# Patient Record
Sex: MALE | Race: WHITE | NOT HISPANIC OR LATINO | Employment: FULL TIME | URBAN - METROPOLITAN AREA
[De-identification: names, ages, dates, MRNs, and addresses within clinical notes are randomized per-mention and may not be internally consistent; named-entity substitution may affect disease eponyms.]

---

## 2017-11-08 RX ORDER — ATENOLOL 50 MG/1
TABLET ORAL
Qty: 30 TABLET | Refills: 11 | Status: SHIPPED | OUTPATIENT
Start: 2017-11-08 | End: 2018-11-15 | Stop reason: SDUPTHER

## 2017-12-21 ENCOUNTER — TELEPHONE (OUTPATIENT)
Dept: FAMILY MEDICINE | Facility: CLINIC | Age: 62
End: 2017-12-21

## 2017-12-21 ENCOUNTER — OFFICE VISIT (OUTPATIENT)
Dept: FAMILY MEDICINE | Facility: CLINIC | Age: 62
End: 2017-12-21
Payer: COMMERCIAL

## 2017-12-21 VITALS
HEART RATE: 60 BPM | SYSTOLIC BLOOD PRESSURE: 108 MMHG | BODY MASS INDEX: 26.32 KG/M2 | DIASTOLIC BLOOD PRESSURE: 70 MMHG | HEIGHT: 71 IN | WEIGHT: 188 LBS | TEMPERATURE: 98 F | RESPIRATION RATE: 16 BRPM

## 2017-12-21 DIAGNOSIS — I10 HYPERTENSION, UNSPECIFIED TYPE: Primary | ICD-10-CM

## 2017-12-21 DIAGNOSIS — Z00.00 WELL ADULT EXAM: ICD-10-CM

## 2017-12-21 DIAGNOSIS — N20.0 KIDNEY STONE: ICD-10-CM

## 2017-12-21 DIAGNOSIS — K64.8 INTERNAL HEMORRHOID: ICD-10-CM

## 2017-12-21 DIAGNOSIS — N40.0 PROSTATE ENLARGEMENT: ICD-10-CM

## 2017-12-21 LAB
ALBUMIN SERPL-MCNC: 4.2 G/DL (ref 3.1–4.7)
ALP SERPL-CCNC: 72 IU/L (ref 40–104)
ALT (SGPT): 33 IU/L (ref 3–33)
AST SERPL-CCNC: 28 IU/L (ref 10–40)
BILIRUB SERPL-MCNC: 0.7 MG/DL (ref 0.3–1)
BILIRUBIN DIRECT+TOT PNL SERPL-MCNC: <0.1 MG/DL (ref 0–0.2)
BUN SERPL-MCNC: 13 MG/DL (ref 8–20)
CALCIUM SERPL-MCNC: 9.1 MG/DL (ref 7.7–10.4)
CHLORIDE: 110 MMOL/L (ref 98–110)
CO2 SERPL-SCNC: 27.1 MMOL/L (ref 22.8–31.6)
COMPLEXED PSA SERPL-MCNC: 2.4 NG/ML (ref 0–3)
CREATININE: 0.99 MG/DL (ref 0.6–1.4)
GLUCOSE: 93 MG/DL (ref 70–99)
HBA1C MFR BLD: 5.5 % (ref 3.1–6.5)
POTASSIUM SERPL-SCNC: 3.7 MMOL/L (ref 3.5–5)
PROT SERPL-MCNC: 7.1 G/DL (ref 6–8.2)
SODIUM: 142 MMOL/L (ref 134–144)

## 2017-12-21 PROCEDURE — 99396 PREV VISIT EST AGE 40-64: CPT | Mod: ,,, | Performed by: FAMILY MEDICINE

## 2017-12-21 RX ORDER — ASPIRIN 81 MG/1
81 TABLET ORAL DAILY
Refills: 0 | COMMUNITY
Start: 2017-12-21 | End: 2018-12-21

## 2017-12-21 NOTE — TELEPHONE ENCOUNTER
----- Message from Dick Mendez MD sent at 12/21/2017  1:33 PM CST -----  Results Ok, notify patient.

## 2017-12-21 NOTE — PROGRESS NOTES
Subjective:       Patient ID: Carlos Saravia is a 62 y.o. male.    Chief Complaint: Annual Exam      History of hypertension which has been stable. He has a history kidney stone several years ago none since and does admit to drinking lots of water at this time and watching his diet has not had an ultrasound and several years. Had a history of high cholesterol on last visit and a A1c that was borderline 6.0.        Allergies and Medications:   Review of patient's allergies indicates:   Allergen Reactions    No known drug allergies      Current Outpatient Prescriptions   Medication Sig Dispense Refill    ascorbic acid (VITAMIN C) 500 MG tablet Take 500 mg by mouth once daily.        atenolol (TENORMIN) 50 MG tablet TAKE ONE TABLET BY MOUTH ONCE DAILY 30 tablet 11    fish oil-omega-3 fatty acids 300-1,000 mg capsule Take 1 g by mouth once daily.        glucosamine &chondroit-mv-min3 944-565-93-0.5 mg Tab Take 1 tablet by mouth once daily.        multivitamin capsule Take 1 capsule by mouth once daily.        psyllium (METAMUCIL) packet Take 1 packet by mouth once daily.      saw palmetto 80 MG capsule Take 1 tablet by mouth Daily. No Sig Provided      aspirin (ECOTRIN) 81 MG EC tablet Take 1 tablet (81 mg total) by mouth once daily.  0     No current facility-administered medications for this visit.        Family History:   Family History   Problem Relation Age of Onset    Hypertension Mother     Diabetes Mother     No Known Problems Father        Social History:   Social History     Social History    Marital status:      Spouse name: N/A    Number of children: N/A    Years of education: N/A     Occupational History    Not on file.     Social History Main Topics    Smoking status: Never Smoker    Smokeless tobacco: Never Used    Alcohol use No    Drug use: No    Sexual activity: Not on file     Other Topics Concern    Not on file     Social History Narrative    No narrative on file        Review of Systems   Constitutional: Negative for activity change, appetite change, chills, diaphoresis, fatigue, fever and unexpected weight change.   HENT: Negative for congestion, dental problem, drooling, ear discharge, ear pain, facial swelling, hearing loss, mouth sores, nosebleeds, postnasal drip, rhinorrhea, sinus pain, sinus pressure, sneezing, sore throat, tinnitus, trouble swallowing and voice change.    Eyes: Negative for photophobia, pain, discharge, redness, itching and visual disturbance.   Respiratory: Negative for apnea, cough, choking, chest tightness, shortness of breath, wheezing and stridor.    Cardiovascular: Negative for chest pain, palpitations and leg swelling.   Gastrointestinal: Negative for abdominal distention, abdominal pain, anal bleeding, blood in stool, constipation, diarrhea, nausea, rectal pain and vomiting.   Endocrine: Negative for cold intolerance, heat intolerance, polydipsia, polyphagia and polyuria.   Genitourinary: Negative for decreased urine volume, difficulty urinating, discharge, dysuria, enuresis, flank pain, frequency, genital sores, hematuria, penile pain, penile swelling, scrotal swelling, testicular pain and urgency.   Musculoskeletal: Positive for back pain (occ related to sitting). Negative for arthralgias, gait problem, joint swelling, myalgias, neck pain and neck stiffness.   Skin: Negative for color change, pallor, rash and wound.   Allergic/Immunologic: Negative for environmental allergies, food allergies and immunocompromised state.   Neurological: Negative for dizziness, tremors, seizures, syncope, facial asymmetry, speech difficulty, weakness, light-headedness, numbness and headaches.   Hematological: Negative for adenopathy. Does not bruise/bleed easily.   Psychiatric/Behavioral: Negative for agitation, behavioral problems, confusion, decreased concentration, dysphoric mood, hallucinations, self-injury, sleep disturbance and suicidal ideas. The  patient is not nervous/anxious and is not hyperactive.        Objective:     Vitals:    12/21/17 0838   BP: 108/70   Pulse: 60   Resp: 16   Temp: 97.5 °F (36.4 °C)        Physical Exam   Constitutional: He is oriented to person, place, and time. He appears well-developed and well-nourished.  Non-toxic appearance. He does not have a sickly appearance. He does not appear ill. No distress.   HENT:   Head: Normocephalic and atraumatic.   Right Ear: External ear normal.   Left Ear: External ear normal.   Nose: Nose normal.   Mouth/Throat: Oropharynx is clear and moist. No oropharyngeal exudate.   Eyes: Conjunctivae and EOM are normal. Pupils are equal, round, and reactive to light. Right eye exhibits no discharge. Left eye exhibits no discharge. No scleral icterus.   Neck: Normal range of motion. Neck supple. No JVD present. No tracheal deviation present. No thyromegaly present.   Cardiovascular: Normal rate, normal heart sounds and intact distal pulses.  Exam reveals no gallop and no friction rub.    No murmur heard.  Pulmonary/Chest: Effort normal and breath sounds normal. No stridor. No respiratory distress. He has no wheezes. He has no rales. He exhibits no tenderness.   Abdominal: Soft. Bowel sounds are normal. He exhibits no distension and no mass. There is no tenderness. There is no rebound and no guarding. No hernia.   Genitourinary: Rectum normal, prostate normal, testes normal and penis normal. Rectal exam shows guaiac negative stool. Cremasteric reflex is present. Right testis shows no mass, no swelling and no tenderness. Right testis is descended. Cremasteric reflex is not absent on the right side. Left testis shows no mass, no swelling and no tenderness. Left testis is descended. Cremasteric reflex is not absent on the left side. Circumcised. No phimosis, paraphimosis, hypospadias, penile erythema or penile tenderness. No discharge found.   Genitourinary Comments: Prostate mildly enlarged internal  hemorrhoids both stable   Musculoskeletal: He exhibits no edema, tenderness or deformity.   Lymphadenopathy:     He has no cervical adenopathy.   Neurological: He is alert and oriented to person, place, and time. He has normal reflexes. He displays normal reflexes. No cranial nerve deficit. He exhibits normal muscle tone. Coordination normal.   Skin: Skin is warm and dry. No rash noted. He is not diaphoretic. No erythema. No pallor.   Psychiatric: He has a normal mood and affect. His behavior is normal. Judgment and thought content normal.   Nursing note and vitals reviewed.    I have recommended that this patient have a flu shot but he declines at this time. I have discussed the risks and benefits of this examination with him. The patient verbalizes understanding.    Assessment:       1. Well adolescent visit    2. Hypertension, unspecified type    3. Prostate enlargement    4. Internal hemorrhoid    5. Kidney stone    6. Well adult exam        Plan:       Carlos was seen today for annual exam.    Diagnoses and all orders for this visit:    Well adolescent visit    Hypertension, unspecified type  -     aspirin (ECOTRIN) 81 MG EC tablet; Take 1 tablet (81 mg total) by mouth once daily.    Prostate enlargement  -     PSA, Screening; Future  -     PSA, Screening    Internal hemorrhoid    Kidney stone  -     US Retroperitoneal Complete (Kidney and; Future    Well adult exam  -     aspirin (ECOTRIN) 81 MG EC tablet; Take 1 tablet (81 mg total) by mouth once daily.  -     Hemoglobin A1c; Future  -     Lipid panel; Future  -     Basic metabolic panel; Future  -     Hepatic function panel; Future  -     Hepatitis C antibody; Future  -     Hemoglobin A1c  -     Lipid panel  -     Basic metabolic panel  -     Hepatic function panel  -     Hepatitis C antibody         Return in about 1 year (around 12/21/2018), or if symptoms worsen or fail to improve.

## 2017-12-22 LAB — HCV AB SERPL QL IA: <0.1 S/CO RATIO (ref 0–0.9)

## 2018-03-05 ENCOUNTER — TELEPHONE (OUTPATIENT)
Dept: FAMILY MEDICINE | Facility: CLINIC | Age: 63
End: 2018-03-05

## 2018-11-15 RX ORDER — ATENOLOL 50 MG/1
50 TABLET ORAL DAILY
Qty: 30 TABLET | Refills: 1 | Status: SHIPPED | OUTPATIENT
Start: 2018-11-15 | End: 2018-12-21 | Stop reason: SDUPTHER

## 2018-11-15 NOTE — TELEPHONE ENCOUNTER
Patient came in the office and requested a refill on Atenolo 50 mg tablet.     Patient has an appointment on December 21. Patient was last seen in the office on 12/21/2017.

## 2018-12-21 ENCOUNTER — OFFICE VISIT (OUTPATIENT)
Dept: FAMILY MEDICINE | Facility: CLINIC | Age: 63
End: 2018-12-21
Payer: MEDICARE

## 2018-12-21 VITALS
DIASTOLIC BLOOD PRESSURE: 80 MMHG | BODY MASS INDEX: 25.34 KG/M2 | SYSTOLIC BLOOD PRESSURE: 118 MMHG | OXYGEN SATURATION: 98 % | HEART RATE: 52 BPM | TEMPERATURE: 98 F | WEIGHT: 181 LBS | HEIGHT: 71 IN

## 2018-12-21 DIAGNOSIS — N40.0 PROSTATE ENLARGEMENT: ICD-10-CM

## 2018-12-21 DIAGNOSIS — M10.9 GOUT, UNSPECIFIED CAUSE, UNSPECIFIED CHRONICITY, UNSPECIFIED SITE: ICD-10-CM

## 2018-12-21 DIAGNOSIS — Z13.220 ENCOUNTER FOR LIPID SCREENING FOR CARDIOVASCULAR DISEASE: ICD-10-CM

## 2018-12-21 DIAGNOSIS — K63.5 POLYP OF COLON, UNSPECIFIED PART OF COLON, UNSPECIFIED TYPE: ICD-10-CM

## 2018-12-21 DIAGNOSIS — Z13.6 ENCOUNTER FOR LIPID SCREENING FOR CARDIOVASCULAR DISEASE: ICD-10-CM

## 2018-12-21 DIAGNOSIS — Z00.00 PREVENTATIVE HEALTH CARE: ICD-10-CM

## 2018-12-21 DIAGNOSIS — Z00.00 WELL ADULT EXAM: ICD-10-CM

## 2018-12-21 DIAGNOSIS — Z28.20 VACCINE REFUSED BY PATIENT: ICD-10-CM

## 2018-12-21 DIAGNOSIS — I10 ESSENTIAL HYPERTENSION: Primary | ICD-10-CM

## 2018-12-21 DIAGNOSIS — Z12.5 ENCOUNTER FOR PROSTATE CANCER SCREENING: ICD-10-CM

## 2018-12-21 LAB
ALBUMIN SERPL-MCNC: 4.3 G/DL (ref 3.1–4.7)
ALP SERPL-CCNC: 71 IU/L (ref 40–104)
ALT (SGPT): 26 IU/L (ref 3–33)
AST SERPL-CCNC: 30 IU/L (ref 10–40)
BILIRUB SERPL-MCNC: 0.9 MG/DL (ref 0.3–1)
BUN SERPL-MCNC: 17 MG/DL (ref 8–20)
CALCIUM SERPL-MCNC: 9.5 MG/DL (ref 7.7–10.4)
CHLORIDE: 108 MMOL/L (ref 98–110)
CO2 SERPL-SCNC: 26 MMOL/L (ref 22.8–31.6)
COMPLEXED PSA SERPL-MCNC: 1.8 NG/ML (ref 0–3)
CREATININE: 0.92 MG/DL (ref 0.6–1.4)
GLUCOSE: 92 MG/DL (ref 70–99)
POTASSIUM SERPL-SCNC: 3.9 MMOL/L (ref 3.5–5)
PROT SERPL-MCNC: 7.6 G/DL (ref 6–8.2)
SODIUM: 143 MMOL/L (ref 134–144)

## 2018-12-21 PROCEDURE — 99214 OFFICE O/P EST MOD 30 MIN: CPT | Mod: ,,, | Performed by: FAMILY MEDICINE

## 2018-12-21 RX ORDER — ATENOLOL 50 MG/1
50 TABLET ORAL DAILY
Qty: 30 TABLET | Refills: 5 | Status: SHIPPED | OUTPATIENT
Start: 2018-12-21 | End: 2019-05-14 | Stop reason: SDUPTHER

## 2018-12-21 NOTE — PROGRESS NOTES
Subjective:       Patient ID: Carlos Saravia is a 63 y.o. male.    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        Allergies and Medications:   Review of patient's allergies indicates:   Allergen Reactions    No known drug allergies      Current Outpatient Medications   Medication Sig Dispense Refill    ascorbic acid (VITAMIN C) 500 MG tablet Take 500 mg by mouth once daily.        atenolol (TENORMIN) 50 MG tablet Take 1 tablet (50 mg total) by mouth once daily. 30 tablet 5    fish oil-omega-3 fatty acids 300-1,000 mg capsule Take 1 g by mouth once daily.        glucosamine &chondroit-mv-min3 082-599-88-0.5 mg Tab Take 1 tablet by mouth once daily.        multivitamin capsule Take 1 capsule by mouth once daily.        psyllium (METAMUCIL) packet Take 1 packet by mouth once daily.      saw palmetto 80 MG capsule Take 1 tablet by mouth Daily. No Sig Provided       No current facility-administered medications for this visit.        Family History:   Family History   Problem Relation Age of Onset    Hypertension Mother     Diabetes Mother     No Known Problems Father        Social History:   Social History     Socioeconomic History    Marital status:      Spouse name: Not on file    Number of children: Not on file    Years of education: Not on file    Highest education level: Not on file   Social Needs    Financial resource strain: Not on file    Food insecurity - worry: Not on file    Food insecurity - inability: Not on file    Transportation needs - medical: Not on file    Transportation needs - non-medical: Not on file   Occupational History    Not on file   Tobacco Use    Smoking status: Never Smoker    Smokeless tobacco: Never Used    Substance and Sexual Activity    Alcohol use: No    Drug use: No    Sexual activity: Not on file   Other Topics Concern    Not on file   Social History Narrative    Not on file       Review of Systems   Constitutional: Negative for activity change, appetite change, chills, diaphoresis, fatigue, fever and unexpected weight change.   HENT: Negative for congestion, dental problem, drooling, ear discharge, ear pain, facial swelling, hearing loss, mouth sores, nosebleeds, postnasal drip, rhinorrhea, sinus pressure, sinus pain, sneezing, sore throat, tinnitus, trouble swallowing and voice change.    Eyes: Negative for photophobia, pain, discharge, redness, itching and visual disturbance.   Respiratory: Negative for apnea, cough, choking, chest tightness, shortness of breath, wheezing and stridor.    Cardiovascular: Negative for chest pain, palpitations and leg swelling.   Gastrointestinal: Negative for abdominal distention, abdominal pain, anal bleeding, blood in stool, constipation, diarrhea, nausea, rectal pain and vomiting.   Endocrine: Negative for cold intolerance, heat intolerance, polydipsia, polyphagia and polyuria.   Genitourinary: Negative for decreased urine volume, difficulty urinating, discharge, dysuria, enuresis, flank pain, frequency, genital sores, hematuria (with high impact activities), penile pain, penile swelling, scrotal swelling, testicular pain and urgency.        Prostatitis     Musculoskeletal: Positive for neck pain. Negative for arthralgias, back pain, gait problem, joint swelling, myalgias and neck stiffness.        Gout: Toes of both feet uses apple cider vinegar lemon juice and water and eradicates the   Skin: Negative for color change, pallor, rash and wound.   Allergic/Immunologic: Negative for environmental allergies, food allergies and immunocompromised state.   Neurological: Negative for dizziness, tremors, seizures, syncope, facial asymmetry, speech difficulty, weakness,  light-headedness, numbness and headaches.   Hematological: Negative for adenopathy. Does not bruise/bleed easily.   Psychiatric/Behavioral: Negative for agitation, behavioral problems, confusion, decreased concentration, dysphoric mood, hallucinations, self-injury, sleep disturbance and suicidal ideas. The patient is not nervous/anxious and is not hyperactive.        Objective:     Vitals:    12/21/18 0828   BP: 118/80   Pulse: (!) 52   Temp: 97.6 °F (36.4 °C)        Physical Exam   Constitutional: He is oriented to person, place, and time. He appears well-developed and well-nourished.  Non-toxic appearance. He does not have a sickly appearance. He does not appear ill. No distress.   HENT:   Head: Normocephalic and atraumatic.   Right Ear: External ear normal.   Left Ear: External ear normal.   Nose: Nose normal.   Mouth/Throat: Oropharynx is clear and moist. No oropharyngeal exudate.   Eyes: Conjunctivae and EOM are normal. Pupils are equal, round, and reactive to light. Right eye exhibits no discharge. Left eye exhibits no discharge. No scleral icterus.   Neck: Normal range of motion. Neck supple. No JVD present. No tracheal deviation present. No thyromegaly present.   Cardiovascular: Normal rate, regular rhythm, normal heart sounds and intact distal pulses. Exam reveals no gallop and no friction rub.   No murmur heard.  Pulmonary/Chest: Effort normal and breath sounds normal. No stridor. No respiratory distress. He has no wheezes. He has no rales. He exhibits no tenderness.   Abdominal: Soft. Bowel sounds are normal. He exhibits no distension and no mass. There is no tenderness. There is no rebound and no guarding. No hernia.   Genitourinary: Rectum normal, prostate normal, testes normal and penis normal. Rectal exam shows guaiac negative stool. Cremasteric reflex is present. Right testis shows no mass, no swelling and no tenderness. Right testis is descended. Cremasteric reflex is not absent on the right side.  Left testis shows no mass, no swelling and no tenderness. Left testis is descended. Cremasteric reflex is not absent on the left side. Circumcised. No phimosis, paraphimosis, hypospadias, penile erythema or penile tenderness. No discharge found.   Musculoskeletal: He exhibits no edema, tenderness or deformity.   Lymphadenopathy:     He has no cervical adenopathy.   Neurological: He is alert and oriented to person, place, and time. He has normal reflexes. He displays normal reflexes. No cranial nerve deficit or sensory deficit. He exhibits normal muscle tone. Coordination normal.   Skin: Skin is warm and dry. Capillary refill takes less than 2 seconds. No rash noted. He is not diaphoretic. No erythema. No pallor.   Psychiatric: He has a normal mood and affect. His behavior is normal. Judgment and thought content normal.   Nursing note and vitals reviewed.      Assessment:       1. Essential hypertension    2. Gout, unspecified cause, unspecified chronicity, unspecified site    3. Prostate enlargement    4. Polyp of colon, unspecified part of colon, unspecified type    5. Encounter for lipid screening for cardiovascular disease    6. Encounter for prostate cancer screening    7. Well adult exam    8. Preventative health care        Plan:       Carlos was seen today for annual exam.    Diagnoses and all orders for this visit:    Essential hypertension  -     atenolol (TENORMIN) 50 MG tablet; Take 1 tablet (50 mg total) by mouth once daily.    Gout, unspecified cause, unspecified chronicity, unspecified site    Prostate enlargement  -     PSA, Screening; Future  -     PSA, Screening    Polyp of colon, unspecified part of colon, unspecified type  -     Ambulatory referral to Gastroenterology    Encounter for lipid screening for cardiovascular disease  -     Lipid panel; Future  -     Lipid panel    Encounter for prostate cancer screening  -     PSA, Screening; Future  -     PSA, Screening    Well adult  exam    Preventative health care  -     Ambulatory referral to Gastroenterology  -     CBC auto differential; Future  -     Comprehensive metabolic panel; Future  -     Lipid panel; Future  -     Urinalysis; Future  -     CBC auto differential  -     Comprehensive metabolic panel  -     Lipid panel  -     Urinalysis         Follow-up in about 6 months (around 6/21/2019).

## 2019-03-25 PROBLEM — Z13.220 ENCOUNTER FOR LIPID SCREENING FOR CARDIOVASCULAR DISEASE: Status: RESOLVED | Noted: 2018-12-21 | Resolved: 2019-03-25

## 2019-03-25 PROBLEM — Z13.6 ENCOUNTER FOR LIPID SCREENING FOR CARDIOVASCULAR DISEASE: Status: RESOLVED | Noted: 2018-12-21 | Resolved: 2019-03-25

## 2019-03-25 PROBLEM — Z00.00 WELL ADULT EXAM: Status: RESOLVED | Noted: 2018-12-21 | Resolved: 2019-03-25

## 2019-05-14 DIAGNOSIS — I10 ESSENTIAL HYPERTENSION: ICD-10-CM

## 2019-05-14 RX ORDER — ATENOLOL 50 MG/1
TABLET ORAL
Qty: 90 TABLET | Refills: 1 | Status: SHIPPED | OUTPATIENT
Start: 2019-05-14 | End: 2019-11-12 | Stop reason: SDUPTHER

## 2019-06-24 ENCOUNTER — OFFICE VISIT (OUTPATIENT)
Dept: FAMILY MEDICINE | Facility: CLINIC | Age: 64
End: 2019-06-24
Payer: MEDICARE

## 2019-06-24 VITALS
HEART RATE: 52 BPM | SYSTOLIC BLOOD PRESSURE: 134 MMHG | BODY MASS INDEX: 25.69 KG/M2 | RESPIRATION RATE: 16 BRPM | TEMPERATURE: 98 F | DIASTOLIC BLOOD PRESSURE: 86 MMHG | WEIGHT: 183.5 LBS | HEIGHT: 71 IN | OXYGEN SATURATION: 97 %

## 2019-06-24 DIAGNOSIS — Z91.89 CARDIOVASCULAR EVENT RISK: ICD-10-CM

## 2019-06-24 DIAGNOSIS — N40.0 PROSTATE ENLARGEMENT: Primary | ICD-10-CM

## 2019-06-24 DIAGNOSIS — E78.2 MIXED HYPERLIPIDEMIA: ICD-10-CM

## 2019-06-24 DIAGNOSIS — I10 ESSENTIAL HYPERTENSION: ICD-10-CM

## 2019-06-24 DIAGNOSIS — Z28.21 IMMUNIZATION REFUSED: ICD-10-CM

## 2019-06-24 LAB
ALBUMIN SERPL-MCNC: 4.1 G/DL (ref 3.1–4.7)
ALP SERPL-CCNC: 72 IU/L (ref 40–104)
ALT (SGPT): 37 IU/L (ref 3–33)
AST SERPL-CCNC: 27 IU/L (ref 10–40)
BILIRUB SERPL-MCNC: 0.9 MG/DL (ref 0.3–1)
BUN SERPL-MCNC: 13 MG/DL (ref 8–20)
CALCIUM SERPL-MCNC: 9.6 MG/DL (ref 7.7–10.4)
CHLORIDE: 108 MMOL/L (ref 98–110)
CO2 SERPL-SCNC: 27.5 MMOL/L (ref 22.8–31.6)
CREATININE: 0.96 MG/DL (ref 0.6–1.4)
GLUCOSE: 100 MG/DL (ref 70–99)
POTASSIUM SERPL-SCNC: 4.1 MMOL/L (ref 3.5–5)
PROT SERPL-MCNC: 7.5 G/DL (ref 6–8.2)
SODIUM: 144 MMOL/L (ref 134–144)

## 2019-06-24 PROCEDURE — 99213 PR OFFICE/OUTPT VISIT, EST, LEVL III, 20-29 MIN: ICD-10-PCS | Mod: ,,, | Performed by: FAMILY MEDICINE

## 2019-06-24 PROCEDURE — 99213 OFFICE O/P EST LOW 20 MIN: CPT | Mod: ,,, | Performed by: FAMILY MEDICINE

## 2019-06-24 RX ORDER — NAPROXEN SODIUM 220 MG/1
81 TABLET, FILM COATED ORAL DAILY
Refills: 0
Start: 2019-06-24 | End: 2023-09-28

## 2019-06-24 RX ORDER — PRAVASTATIN SODIUM 20 MG/1
20 TABLET ORAL DAILY
Qty: 90 TABLET | Refills: 3 | Status: SHIPPED | OUTPATIENT
Start: 2019-06-24 | End: 2020-05-25

## 2019-06-24 NOTE — PATIENT INSTRUCTIONS
4 Steps for Eating Healthier  Changing the way you eat can improve your health. It can lower your cholesterol and blood pressure, and help you stay at a healthy weight. Your diet doesnt have to be bland and boring to be healthy. Just watch your calories and follow these steps:    1. Eat fewer unhealthy fats  · Choose more fish and lean meats instead of fatty cuts of meat.  · Skip butter and lard, and use less margarine.  · Pass on foods that have palm, coconut, or hydrogenated oils.  · Eat fewer high-fat dairy foods like cheese, ice cream, and whole milk.  · Get a heart-healthy cookbook and try some low-fat recipes.  2. Go light on salt  · Keep the saltshaker off the table.  · Limit high-salt ingredients, such as soy sauce, bouillon, and garlic salt.  · Instead of adding salt when cooking, season your food with herbs and flavorings. Try lemon, garlic, and onion.  · Limit convenience foods, such as boxed or canned foods and restaurant food.  · Read food labels and choose lower-sodium options.  3. Limit sugar  · Pause before you add sugars to pancakes, cereal, coffee, or tea. This includes white and brown table sugar, syrup, honey, and molasses. Cut your usual amount by half.  · Use non-sugar sweeteners. Stevia, aspartame, and sucralose can satisfy a sweet tooth without adding calories.  · Swap out sugar-filled soda and other drinks. Buy sugar-free or low-calorie beverages. Remember water is always the best choice.  · Read labels and choose foods with less added sugar. Keep in mind that dairy foods and foods with fruit will have some natural sugar.  · Cut the sugar in recipes by 1/3 to 1/2. Boost the flavor with extracts like almond, vanilla, or orange. Or add spices such as cinnamon or nutmeg.  4. Eat more fiber  · Eat fresh fruits and vegetables every day.  · Boost your diet with whole grains. Go for oats, whole-grain rice, and bran.  · Add beans and lentils to your meals.  · Drink more water to match your fiber  increase. This is to help prevent constipation.  Date Last Reviewed: 5/11/2015  © 5687-8721 [employer]. [address] [place] [address], [place] [address]. All rights reserved. This information is not intended as a substitute for professional medical care. Always follow your healthcare professional's instructions.

## 2019-06-24 NOTE — PROGRESS NOTES
Subjective:       Patient ID: Carlos Saravia is a 63 y.o. male.    Chief Complaint: Hypertension (6 month follow up ) and Hyperlipidemia (6 month follow up )      Patient is here to follow-up on cholesterol and hypertension he had blood work done 6 months ago showed an elevated cholesterol.  Cholesterol                   247 H                       mg/dL       Triglycerides                 123                         mg/dL       HDL Cholesterol                45                        23-75  mg/dL       LDL Cholesterol               177 H                      0-100  mg/dL       VLDL Cholesterol               25                        12-27  mg/dL       Cholesterol Ratio            5.00                                            CHOLESTEROL RATIO INTERPRETATION   Patient does not want to take any statins or other cholesterol lowering drugs but is taking red yeast rice, and tolerates it okay.                          MEN-- WOMEN     His blood pressure has been stable on his current regimen. He has an enlarged prostate but his symptoms are controlled on saw palmetto has to void once to twice per night.        Hypertension     Hyperlipidemia         Allergies and Medications:   Review of patient's allergies indicates:   Allergen Reactions    No known drug allergies      Current Outpatient Medications   Medication Sig Dispense Refill    ascorbic acid (VITAMIN C) 500 MG tablet Take 500 mg by mouth once daily.        atenolol (TENORMIN) 50 MG tablet TAKE 1 TABLET BY MOUTH ONCE DAILY 90 tablet 1    fish oil-omega-3 fatty acids 300-1,000 mg capsule Take 1 g by mouth once daily.        glucosamine &chondroit-mv-min3 913-874-58-0.5 mg Tab Take 1 tablet by mouth once daily.        multivitamin capsule Take 1 capsule by mouth once daily.        psyllium (METAMUCIL) packet Take 1 packet by mouth once daily.      saw palmetto 80 MG capsule Take 1 tablet by mouth Daily. No Sig Provided      aspirin 81 MG Chew Take  1 tablet (81 mg total) by mouth once daily.  0    pravastatin (PRAVACHOL) 20 MG tablet Take 1 tablet (20 mg total) by mouth once daily. 90 tablet 3     No current facility-administered medications for this visit.        Family History:   Family History   Problem Relation Age of Onset    Hypertension Mother     Diabetes Mother     No Known Problems Father        Social History:   Social History     Socioeconomic History    Marital status:      Spouse name: Not on file    Number of children: Not on file    Years of education: Not on file    Highest education level: Not on file   Occupational History    Not on file   Social Needs    Financial resource strain: Not on file    Food insecurity:     Worry: Not on file     Inability: Not on file    Transportation needs:     Medical: Not on file     Non-medical: Not on file   Tobacco Use    Smoking status: Never Smoker    Smokeless tobacco: Never Used   Substance and Sexual Activity    Alcohol use: No    Drug use: No    Sexual activity: Not on file   Lifestyle    Physical activity:     Days per week: Not on file     Minutes per session: Not on file    Stress: Not on file   Relationships    Social connections:     Talks on phone: Not on file     Gets together: Not on file     Attends [employer] service: Not on file     Active member of club or organization: Not on file     Attends meetings of clubs or organizations: Not on file     Relationship status: Not on file   Other Topics Concern    Not on file   Social History Narrative    Not on file       Review of Systems    Objective:     Vitals:    06/24/19 0905   BP: 134/86   Pulse: (!) 52   Resp: 16   Temp: 98 °F (36.7 °C)        Physical Exam   Cardiovascular: Normal rate, regular rhythm, normal heart sounds and intact distal pulses. Exam reveals no gallop and no friction rub.   No murmur heard.      Assessment:       1. Prostate enlargement    2. Essential hypertension    3. Mixed hyperlipidemia     4. Cardiovascular event risk    5. Immunization refused        Plan:       Carlos was seen today for hypertension and hyperlipidemia.    Diagnoses and all orders for this visit:    Prostate enlargement    Essential hypertension    Mixed hyperlipidemia  -     Comprehensive metabolic panel; Future  -     Lipid panel; Future  -     Comprehensive metabolic panel  -     Lipid panel  -     pravastatin (PRAVACHOL) 20 MG tablet; Take 1 tablet (20 mg total) by mouth once daily.    Cardiovascular event risk  -     Lipid panel; Future  -     aspirin 81 MG Chew; Take 1 tablet (81 mg total) by mouth once daily.  -     Lipid panel    Immunization refused         Follow up in about 3 months (around 9/24/2019) for follow up cholesterol.

## 2019-11-12 DIAGNOSIS — I10 ESSENTIAL HYPERTENSION: ICD-10-CM

## 2019-11-12 RX ORDER — ATENOLOL 50 MG/1
TABLET ORAL
Qty: 90 TABLET | Refills: 1 | Status: SHIPPED | OUTPATIENT
Start: 2019-11-12 | End: 2020-05-11

## 2019-12-30 ENCOUNTER — OFFICE VISIT (OUTPATIENT)
Dept: FAMILY MEDICINE | Facility: CLINIC | Age: 64
End: 2019-12-30
Payer: MEDICARE

## 2019-12-30 VITALS
TEMPERATURE: 98 F | HEIGHT: 71 IN | OXYGEN SATURATION: 99 % | BODY MASS INDEX: 24.95 KG/M2 | SYSTOLIC BLOOD PRESSURE: 134 MMHG | HEART RATE: 58 BPM | DIASTOLIC BLOOD PRESSURE: 82 MMHG | WEIGHT: 178.19 LBS

## 2019-12-30 DIAGNOSIS — M10.9 GOUT, UNSPECIFIED CAUSE, UNSPECIFIED CHRONICITY, UNSPECIFIED SITE: ICD-10-CM

## 2019-12-30 DIAGNOSIS — E78.2 MIXED HYPERLIPIDEMIA: ICD-10-CM

## 2019-12-30 DIAGNOSIS — I10 ESSENTIAL HYPERTENSION: ICD-10-CM

## 2019-12-30 DIAGNOSIS — Z28.21 IMMUNIZATION REFUSED: ICD-10-CM

## 2019-12-30 DIAGNOSIS — Z12.5 ENCOUNTER FOR SCREENING FOR MALIGNANT NEOPLASM OF PROSTATE: ICD-10-CM

## 2019-12-30 DIAGNOSIS — Z23 IMMUNIZATION DUE: ICD-10-CM

## 2019-12-30 DIAGNOSIS — N40.0 PROSTATE ENLARGEMENT: Primary | ICD-10-CM

## 2019-12-30 PROCEDURE — 1170F PR FUNCTIONAL STATUS ASSESSED: ICD-10-PCS | Mod: ,,, | Performed by: FAMILY MEDICINE

## 2019-12-30 PROCEDURE — 1126F PR PAIN SEVERITY QUANTIFIED, NO PAIN PRESENT: ICD-10-PCS | Mod: ,,, | Performed by: FAMILY MEDICINE

## 2019-12-30 PROCEDURE — 99214 OFFICE O/P EST MOD 30 MIN: CPT | Performed by: FAMILY MEDICINE

## 2019-12-30 PROCEDURE — 1159F PR MEDICATION LIST DOCUMENTED IN MEDICAL RECORD: ICD-10-PCS | Mod: ,,, | Performed by: FAMILY MEDICINE

## 2019-12-30 PROCEDURE — 1170F FXNL STATUS ASSESSED: CPT | Mod: ,,, | Performed by: FAMILY MEDICINE

## 2019-12-30 PROCEDURE — 99214 PR OFFICE/OUTPT VISIT, EST, LEVL IV, 30-39 MIN: ICD-10-PCS | Mod: S$PBB,,, | Performed by: FAMILY MEDICINE

## 2019-12-30 PROCEDURE — 99214 OFFICE O/P EST MOD 30 MIN: CPT | Mod: S$PBB,,, | Performed by: FAMILY MEDICINE

## 2019-12-30 PROCEDURE — 1126F AMNT PAIN NOTED NONE PRSNT: CPT | Mod: ,,, | Performed by: FAMILY MEDICINE

## 2019-12-30 PROCEDURE — 1159F MED LIST DOCD IN RCRD: CPT | Mod: ,,, | Performed by: FAMILY MEDICINE

## 2019-12-30 RX ORDER — ASCORBIC ACID 500 MG
500 TABLET ORAL DAILY
COMMUNITY
Start: 2019-12-30 | End: 2021-07-07

## 2019-12-30 RX ORDER — AA/PROT/LYSINE/METHIO/VIT C/B6 50-12.5 MG
10 TABLET ORAL DAILY
COMMUNITY
Start: 2019-12-30 | End: 2020-07-29 | Stop reason: ALTCHOICE

## 2019-12-30 NOTE — PROGRESS NOTES
Subjective:       Patient ID: Carlos Saravia is a 64 y.o. male.    Chief Complaint: Hyperlipidemia (6 month follow up )      Patient is here for follow-up on his cholesterol.  Lab Results       Component                Value               Date                       AST                      27                  06/24/2019                 NA                       144                 06/24/2019                 K                        4.1                 06/24/2019                 CL                       108                 06/24/2019                 CREATININE               0.96                06/24/2019                 BUN                      13                  06/24/2019                 CO2                      27.5                06/24/2019                 PSA                      1.8                 12/21/2018                 HGBA1C                   5.5                 12/21/2017            Patient expressed some concerns about his PSA as it was elevated to 2.42 years ago but has come down to 1.8 last year has not had it done in over a year.  Patient's cholesterol has been 247 6 months ago and 240 a year ago he is taking an over-the-counter product called and AMLA.  Patient does want his cholesterol to be rechecked but is hesitant to take a statin because of listed side effects adverse reactions adverse events he does take Co Q10 will and a supplement with other things.  Patient and his wife are very proactive in looking into the quality of the supplements etc.      Hyperlipidemia   This is a chronic problem. The problem is controlled. Exacerbating diseases include chronic renal disease. Associated symptoms include chest pain. The current treatment provides moderate improvement of lipids.       Allergies and Medications:   Review of patient's allergies indicates:   Allergen Reactions    No known drug allergies      Current Outpatient Medications   Medication Sig Dispense Refill    ascorbic acid, vitamin C,  (VITAMIN C) 500 MG tablet Take 1 tablet (500 mg total) by mouth once daily.      atenolol (TENORMIN) 50 MG tablet TAKE 1 TABLET BY MOUTH ONCE DAILY 90 tablet 1    fish oil-omega-3 fatty acids 300-1,000 mg capsule Take 1 g by mouth once daily.        glucosamine &chondroit-mv-min3 343-522-01-0.5 mg Tab Take 1 tablet by mouth once daily.        multivitamin capsule Take 1 capsule by mouth once daily.        psyllium (METAMUCIL) packet Take 1 packet by mouth once daily.      saw palmetto 80 MG capsule Take 1 tablet by mouth Daily. No Sig Provided      aspirin 81 MG Chew Take 1 tablet (81 mg total) by mouth once daily. (Patient not taking: Reported on 12/30/2019)  0    coenzyme Q10 10 mg capsule Take 10 mg by mouth once daily.      pravastatin (PRAVACHOL) 20 MG tablet Take 1 tablet (20 mg total) by mouth once daily. (Patient not taking: Reported on 12/30/2019) 90 tablet 3    varicella-zoster gE-AS01B, PF, (SHINGRIX, PF,) 50 mcg/0.5 mL injection Inject 0.5 mLs into the muscle once. for 1 dose 0.5 mL 0     No current facility-administered medications for this visit.        Family History:   Family History   Problem Relation Age of Onset    Hypertension Mother     Diabetes Mother     No Known Problems Father        Social History:   Social History     Socioeconomic History    Marital status:      Spouse name: Not on file    Number of children: Not on file    Years of education: Not on file    Highest education level: Not on file   Occupational History    Not on file   Social Needs    Financial resource strain: Not on file    Food insecurity:     Worry: Not on file     Inability: Not on file    Transportation needs:     Medical: Not on file     Non-medical: Not on file   Tobacco Use    Smoking status: Never Smoker    Smokeless tobacco: Never Used   Substance and Sexual Activity    Alcohol use: No    Drug use: No    Sexual activity: Not on file   Lifestyle    Physical activity:     Days per  week: Not on file     Minutes per session: Not on file    Stress: Not on file   Relationships    Social connections:     Talks on phone: Not on file     Gets together: Not on file     Attends Mosque service: Not on file     Active member of club or organization: Not on file     Attends meetings of clubs or organizations: Not on file     Relationship status: Not on file   Other Topics Concern    Not on file   Social History Narrative    Not on file       Review of Systems   Cardiovascular: Positive for chest pain.       Objective:     Vitals:    12/30/19 0939   BP: 134/82   Pulse: (!) 58   Temp: 97.8 °F (36.6 °C)        Physical Exam   Constitutional: He appears well-developed and well-nourished.   HENT:   Head: Normocephalic.   Cardiovascular: Normal rate, regular rhythm, normal heart sounds and intact distal pulses. Exam reveals no gallop and no friction rub.   No murmur heard.  Pulmonary/Chest: Effort normal and breath sounds normal. No stridor. No respiratory distress. He has no wheezes. He has no rales. He exhibits no tenderness.   Nursing note and vitals reviewed.      Assessment:       1. Prostate enlargement stable   2. Essential hypertension stable   3. Mixed hyperlipidemia patient has up to avoid statins at this time will recheck his cholesterol his request.   4. Immunization due    5. Immunization refused patient declines the flu vaccine and pneumococcal vaccines at this time.   6. Encounter for screening for malignant neoplasm of prostate     7. Gout, unspecified cause, unspecified chronicity, unspecified site patient has a history of mild intermittent attacks of gout in the foot once his uric acid rechecked.       Plan:       Carlos was seen today for hyperlipidemia.    Diagnoses and all orders for this visit:    Prostate enlargement  -     PSA, Screening; Future    Essential hypertension  -     Comprehensive metabolic panel; Future  -     Comprehensive metabolic panel; Future    Mixed  hyperlipidemia  -     Comprehensive metabolic panel; Future  -     Lipid panel; Future  -     Lipid panel; Future  -     coenzyme Q10 10 mg capsule; Take 10 mg by mouth once daily.    Immunization due  -     Pneumococcal Conjugate Vaccine (13 Valent) (IM)  -     varicella-zoster gE-AS01B, PF, (SHINGRIX, PF,) 50 mcg/0.5 mL injection; Inject 0.5 mLs into the muscle once. for 1 dose    Immunization refused    Encounter for screening for malignant neoplasm of prostate   -     PSA, Screening; Future    Gout, unspecified cause, unspecified chronicity, unspecified site  -     Uric acid; Future    Other orders  -     Cancel: varicella-zoster gE-AS01B, PF, (SHINGRIX, PF,) 50 mcg/0.5 mL injection; Inject 0.5 mLs into the muscle once. for 1 dose  -     ascorbic acid, vitamin C, (VITAMIN C) 500 MG tablet; Take 1 tablet (500 mg total) by mouth once daily.         Follow up in about 3 months (around 3/30/2020).

## 2020-01-03 ENCOUNTER — LAB VISIT (OUTPATIENT)
Dept: LAB | Facility: HOSPITAL | Age: 65
End: 2020-01-03
Attending: FAMILY MEDICINE
Payer: MEDICARE

## 2020-01-03 DIAGNOSIS — M10.9 GOUT, UNSPECIFIED CAUSE, UNSPECIFIED CHRONICITY, UNSPECIFIED SITE: ICD-10-CM

## 2020-01-03 DIAGNOSIS — Z12.5 ENCOUNTER FOR SCREENING FOR MALIGNANT NEOPLASM OF PROSTATE: ICD-10-CM

## 2020-01-03 DIAGNOSIS — N40.0 PROSTATE ENLARGEMENT: ICD-10-CM

## 2020-01-03 DIAGNOSIS — E78.2 MIXED HYPERLIPIDEMIA: ICD-10-CM

## 2020-01-03 DIAGNOSIS — I10 ESSENTIAL HYPERTENSION: ICD-10-CM

## 2020-01-03 LAB
ALBUMIN SERPL BCP-MCNC: 4 G/DL (ref 3.5–5.2)
ALP SERPL-CCNC: 62 U/L (ref 55–135)
ALT SERPL W/O P-5'-P-CCNC: 25 U/L (ref 10–44)
ANION GAP SERPL CALC-SCNC: 8 MMOL/L (ref 8–16)
AST SERPL-CCNC: 25 U/L (ref 10–40)
BILIRUB SERPL-MCNC: 1.1 MG/DL (ref 0.1–1)
BUN SERPL-MCNC: 14 MG/DL (ref 8–23)
CALCIUM SERPL-MCNC: 9.2 MG/DL (ref 8.7–10.5)
CHLORIDE SERPL-SCNC: 109 MMOL/L (ref 95–110)
CHOLEST SERPL-MCNC: 216 MG/DL (ref 120–199)
CHOLEST/HDLC SERPL: 4.6 {RATIO} (ref 2–5)
CO2 SERPL-SCNC: 25 MMOL/L (ref 23–29)
COMPLEXED PSA SERPL-MCNC: 1.8 NG/ML (ref 0–4)
CREAT SERPL-MCNC: 1 MG/DL (ref 0.5–1.4)
EST. GFR  (AFRICAN AMERICAN): >60 ML/MIN/1.73 M^2
EST. GFR  (NON AFRICAN AMERICAN): >60 ML/MIN/1.73 M^2
GLUCOSE SERPL-MCNC: 95 MG/DL (ref 70–110)
HDLC SERPL-MCNC: 47 MG/DL (ref 40–75)
HDLC SERPL: 21.8 % (ref 20–50)
LDLC SERPL CALC-MCNC: 153.4 MG/DL (ref 63–159)
NONHDLC SERPL-MCNC: 169 MG/DL
POTASSIUM SERPL-SCNC: 3.8 MMOL/L (ref 3.5–5.1)
PROT SERPL-MCNC: 7 G/DL (ref 6–8.4)
SODIUM SERPL-SCNC: 142 MMOL/L (ref 136–145)
TRIGL SERPL-MCNC: 78 MG/DL (ref 30–150)
URATE SERPL-MCNC: 8.3 MG/DL (ref 3.4–7)

## 2020-01-03 PROCEDURE — 84550 ASSAY OF BLOOD/URIC ACID: CPT

## 2020-01-03 PROCEDURE — 80061 LIPID PANEL: CPT

## 2020-01-03 PROCEDURE — 36415 COLL VENOUS BLD VENIPUNCTURE: CPT

## 2020-01-03 PROCEDURE — 84153 ASSAY OF PSA TOTAL: CPT

## 2020-01-03 PROCEDURE — 80053 COMPREHEN METABOLIC PANEL: CPT

## 2020-01-08 ENCOUNTER — TELEPHONE (OUTPATIENT)
Dept: FAMILY MEDICINE | Facility: CLINIC | Age: 65
End: 2020-01-08

## 2020-01-08 DIAGNOSIS — M1A.00X0 IDIOPATHIC CHRONIC GOUT WITHOUT TOPHUS, UNSPECIFIED SITE: ICD-10-CM

## 2020-01-08 DIAGNOSIS — E78.2 MIXED HYPERLIPIDEMIA: Primary | ICD-10-CM

## 2020-01-08 RX ORDER — PRAVASTATIN SODIUM 40 MG/1
40 TABLET ORAL DAILY
Qty: 90 TABLET | Refills: 3 | Status: SHIPPED | OUTPATIENT
Start: 2020-01-08 | End: 2020-05-25

## 2020-01-08 RX ORDER — ALLOPURINOL 300 MG/1
300 TABLET ORAL DAILY
Qty: 30 TABLET | Refills: 11 | Status: SHIPPED | OUTPATIENT
Start: 2020-01-08 | End: 2020-05-25

## 2020-01-08 NOTE — TELEPHONE ENCOUNTER
Lab Results   Component Value Date    CHOL 216 (H) 01/03/2020    TRIG 78 01/03/2020    HDL 47 01/03/2020    ALT 25 01/03/2020    AST 25 01/03/2020     01/03/2020    K 3.8 01/03/2020     01/03/2020    CREATININE 1.0 01/03/2020    BUN 14 01/03/2020    CO2 25 01/03/2020    PSA 1.8 01/03/2020    HGBA1C 5.5 12/21/2017    Uric acid was also high at 8.3.  He does have a history of gout needs to be back on allopurinol to lower the uric acid levels.  LDL and total cholesterol or high as well and need to increase his pravastatin to 40 mg.

## 2020-01-08 NOTE — TELEPHONE ENCOUNTER
----- Message from Dick Mendez MD sent at 1/3/2020 11:57 AM CST -----  Results Ok, notify patient.

## 2020-01-08 NOTE — TELEPHONE ENCOUNTER
Please review labs again Uric acid is high and his cholesterol is high.  Patient is waiting for a call back regarding labs   Thank You   Giovanna

## 2020-05-11 DIAGNOSIS — I10 ESSENTIAL HYPERTENSION: ICD-10-CM

## 2020-05-11 RX ORDER — ATENOLOL 50 MG/1
TABLET ORAL
Qty: 90 TABLET | Refills: 0 | Status: SHIPPED | OUTPATIENT
Start: 2020-05-11 | End: 2020-06-30 | Stop reason: DRUGHIGH

## 2020-05-25 ENCOUNTER — HOSPITAL ENCOUNTER (OUTPATIENT)
Facility: HOSPITAL | Age: 65
Discharge: HOME | End: 2020-05-27
Attending: EMERGENCY MEDICINE | Admitting: FAMILY MEDICINE
Payer: MEDICARE

## 2020-05-25 DIAGNOSIS — R07.89 OTHER CHEST PAIN: ICD-10-CM

## 2020-05-25 DIAGNOSIS — R07.9 CHEST PAIN: Primary | ICD-10-CM

## 2020-05-25 DIAGNOSIS — I10 ESSENTIAL HYPERTENSION: ICD-10-CM

## 2020-05-25 PROBLEM — E78.5 HYPERLIPIDEMIA: Status: ACTIVE | Noted: 2019-06-24

## 2020-05-25 LAB
ALBUMIN SERPL BCP-MCNC: 4.2 G/DL (ref 3.5–5.2)
ALP SERPL-CCNC: 70 U/L (ref 55–135)
ALT SERPL W/O P-5'-P-CCNC: 29 U/L (ref 10–44)
ANION GAP SERPL CALC-SCNC: 5 MMOL/L (ref 8–16)
AST SERPL-CCNC: 26 U/L (ref 10–40)
BASOPHILS # BLD AUTO: 0.05 K/UL (ref 0–0.2)
BASOPHILS NFR BLD: 0.6 % (ref 0–1.9)
BILIRUB SERPL-MCNC: 0.6 MG/DL (ref 0.1–1)
BNP SERPL-MCNC: 67 PG/ML (ref 0–99)
BUN SERPL-MCNC: 15 MG/DL (ref 8–23)
CALCIUM SERPL-MCNC: 9.4 MG/DL (ref 8.7–10.5)
CHLORIDE SERPL-SCNC: 104 MMOL/L (ref 95–110)
CO2 SERPL-SCNC: 26 MMOL/L (ref 23–29)
CREAT SERPL-MCNC: 1 MG/DL (ref 0.5–1.4)
DIFFERENTIAL METHOD: NORMAL
EOSINOPHIL # BLD AUTO: 0.1 K/UL (ref 0–0.5)
EOSINOPHIL NFR BLD: 1.5 % (ref 0–8)
ERYTHROCYTE [DISTWIDTH] IN BLOOD BY AUTOMATED COUNT: 13.1 % (ref 11.5–14.5)
EST. GFR  (AFRICAN AMERICAN): >60 ML/MIN/1.73 M^2
EST. GFR  (NON AFRICAN AMERICAN): >60 ML/MIN/1.73 M^2
GLUCOSE SERPL-MCNC: 121 MG/DL (ref 70–110)
HCT VFR BLD AUTO: 49.3 % (ref 40–54)
HGB BLD-MCNC: 16 G/DL (ref 14–18)
IMM GRANULOCYTES # BLD AUTO: 0.02 K/UL (ref 0–0.04)
IMM GRANULOCYTES NFR BLD AUTO: 0.3 % (ref 0–0.5)
INR PPP: 1
LYMPHOCYTES # BLD AUTO: 2.7 K/UL (ref 1–4.8)
LYMPHOCYTES NFR BLD: 33.8 % (ref 18–48)
MCH RBC QN AUTO: 29.4 PG (ref 27–31)
MCHC RBC AUTO-ENTMCNC: 32.5 G/DL (ref 32–36)
MCV RBC AUTO: 91 FL (ref 82–98)
MONOCYTES # BLD AUTO: 0.6 K/UL (ref 0.3–1)
MONOCYTES NFR BLD: 7.9 % (ref 4–15)
NEUTROPHILS # BLD AUTO: 4.4 K/UL (ref 1.8–7.7)
NEUTROPHILS NFR BLD: 55.9 % (ref 38–73)
NRBC BLD-RTO: 0 /100 WBC
PLATELET # BLD AUTO: 215 K/UL (ref 150–350)
PMV BLD AUTO: 9.7 FL (ref 9.2–12.9)
POTASSIUM SERPL-SCNC: 3.5 MMOL/L (ref 3.5–5.1)
PROT SERPL-MCNC: 7.7 G/DL (ref 6–8.4)
PROTHROMBIN TIME: 13.1 SEC (ref 10.6–14.8)
RBC # BLD AUTO: 5.44 M/UL (ref 4.6–6.2)
SARS-COV-2 RDRP RESP QL NAA+PROBE: NEGATIVE
SODIUM SERPL-SCNC: 135 MMOL/L (ref 136–145)
TROPONIN I SERPL DL<=0.01 NG/ML-MCNC: <0.03 NG/ML
WBC # BLD AUTO: 7.95 K/UL (ref 3.9–12.7)

## 2020-05-25 PROCEDURE — 85610 PROTHROMBIN TIME: CPT

## 2020-05-25 PROCEDURE — U0002 COVID-19 LAB TEST NON-CDC: HCPCS

## 2020-05-25 PROCEDURE — 99285 EMERGENCY DEPT VISIT HI MDM: CPT | Mod: 25,CS

## 2020-05-25 PROCEDURE — 80053 COMPREHEN METABOLIC PANEL: CPT

## 2020-05-25 PROCEDURE — 83880 ASSAY OF NATRIURETIC PEPTIDE: CPT

## 2020-05-25 PROCEDURE — 85025 COMPLETE CBC W/AUTO DIFF WBC: CPT

## 2020-05-25 PROCEDURE — 93005 ELECTROCARDIOGRAM TRACING: CPT | Mod: 59 | Performed by: INTERNAL MEDICINE

## 2020-05-25 PROCEDURE — G0378 HOSPITAL OBSERVATION PER HR: HCPCS

## 2020-05-25 PROCEDURE — 25000003 PHARM REV CODE 250: Performed by: EMERGENCY MEDICINE

## 2020-05-25 PROCEDURE — 84484 ASSAY OF TROPONIN QUANT: CPT

## 2020-05-25 PROCEDURE — G0378 HOSPITAL OBSERVATION PER HR: HCPCS | Mod: CS

## 2020-05-25 RX ORDER — ATENOLOL 25 MG/1
50 TABLET ORAL DAILY
Status: DISCONTINUED | OUTPATIENT
Start: 2020-05-26 | End: 2020-05-26

## 2020-05-25 RX ORDER — NITROGLYCERIN 0.4 MG/1
0.4 TABLET SUBLINGUAL EVERY 5 MIN PRN
Status: DISCONTINUED | OUTPATIENT
Start: 2020-05-25 | End: 2020-05-27 | Stop reason: HOSPADM

## 2020-05-25 RX ORDER — NAPROXEN SODIUM 220 MG/1
162 TABLET, FILM COATED ORAL
Status: COMPLETED | OUTPATIENT
Start: 2020-05-25 | End: 2020-05-25

## 2020-05-25 RX ORDER — POTASSIUM &MAGNESIUM ASPARTATE 250-250 MG
1500 CAPSULE ORAL
COMMUNITY
End: 2020-07-29 | Stop reason: ALTCHOICE

## 2020-05-25 RX ORDER — CHOLECALCIFEROL (VITAMIN D3) 125 MCG
5000 CAPSULE ORAL DAILY
COMMUNITY

## 2020-05-25 RX ORDER — SODIUM CHLORIDE 0.9 % (FLUSH) 0.9 %
10 SYRINGE (ML) INJECTION
Status: DISCONTINUED | OUTPATIENT
Start: 2020-05-25 | End: 2020-05-27 | Stop reason: HOSPADM

## 2020-05-25 RX ORDER — BISACODYL 10 MG
10 SUPPOSITORY, RECTAL RECTAL DAILY PRN
Status: DISCONTINUED | OUTPATIENT
Start: 2020-05-25 | End: 2020-05-27 | Stop reason: HOSPADM

## 2020-05-25 RX ORDER — NAPROXEN SODIUM 220 MG/1
81 TABLET, FILM COATED ORAL DAILY
Status: DISCONTINUED | OUTPATIENT
Start: 2020-05-26 | End: 2020-05-27 | Stop reason: HOSPADM

## 2020-05-25 RX ORDER — NAPROXEN SODIUM 220 MG/1
81 TABLET, FILM COATED ORAL DAILY
Status: DISCONTINUED | OUTPATIENT
Start: 2020-05-26 | End: 2020-05-26

## 2020-05-25 RX ORDER — ACETAMINOPHEN 325 MG/1
650 TABLET ORAL EVERY 4 HOURS PRN
Status: DISCONTINUED | OUTPATIENT
Start: 2020-05-25 | End: 2020-05-27 | Stop reason: HOSPADM

## 2020-05-25 RX ORDER — ONDANSETRON 2 MG/ML
4 INJECTION INTRAMUSCULAR; INTRAVENOUS EVERY 8 HOURS PRN
Status: DISCONTINUED | OUTPATIENT
Start: 2020-05-25 | End: 2020-05-27 | Stop reason: HOSPADM

## 2020-05-25 RX ADMIN — ASPIRIN 81 MG 162 MG: 81 TABLET ORAL at 09:05

## 2020-05-25 NOTE — Clinical Note
Catheter is inserted into the ostium   left main. Angiography performed of the left coronary arteries in multiple views. Angiography performed via power injection with 10 mL contrast at 5 mL/sPower injection PSI was 400.. JL4

## 2020-05-25 NOTE — Clinical Note
65 ml injected throughout the case. 85 mL total wasted during the case. 150 mL total used in the case.

## 2020-05-25 NOTE — Clinical Note
Catheter is inserted into the left ventricle. Angiography performed of the LV. Angiography performed via power injection with 24 mL contrast at 12 mL/sPower injection PSI was 700.. pigtail

## 2020-05-26 ENCOUNTER — HOSPITAL ENCOUNTER (OUTPATIENT)
Dept: RADIOLOGY | Facility: HOSPITAL | Age: 65
Discharge: HOME | End: 2020-05-26
Attending: EMERGENCY MEDICINE
Payer: MEDICARE

## 2020-05-26 ENCOUNTER — CLINICAL SUPPORT (OUTPATIENT)
Dept: CARDIOLOGY | Facility: HOSPITAL | Age: 65
End: 2020-05-26
Attending: FAMILY MEDICINE
Payer: MEDICARE

## 2020-05-26 LAB
ANION GAP SERPL CALC-SCNC: 10 MMOL/L (ref 8–16)
BASOPHILS # BLD AUTO: 0.05 K/UL (ref 0–0.2)
BASOPHILS NFR BLD: 0.7 % (ref 0–1.9)
BUN SERPL-MCNC: 14 MG/DL (ref 8–23)
CALCIUM SERPL-MCNC: 9.1 MG/DL (ref 8.7–10.5)
CHLORIDE SERPL-SCNC: 107 MMOL/L (ref 95–110)
CO2 SERPL-SCNC: 25 MMOL/L (ref 23–29)
CREAT SERPL-MCNC: 1 MG/DL (ref 0.5–1.4)
CV STRESS BASE HR: 52 BPM
DIASTOLIC BLOOD PRESSURE: 87 MMHG
DIFFERENTIAL METHOD: NORMAL
EOSINOPHIL # BLD AUTO: 0.1 K/UL (ref 0–0.5)
EOSINOPHIL NFR BLD: 1.4 % (ref 0–8)
ERYTHROCYTE [DISTWIDTH] IN BLOOD BY AUTOMATED COUNT: 13.1 % (ref 11.5–14.5)
EST. GFR  (AFRICAN AMERICAN): >60 ML/MIN/1.73 M^2
EST. GFR  (NON AFRICAN AMERICAN): >60 ML/MIN/1.73 M^2
GLUCOSE SERPL-MCNC: 99 MG/DL (ref 70–110)
HCT VFR BLD AUTO: 47.4 % (ref 40–54)
HGB BLD-MCNC: 15.6 G/DL (ref 14–18)
IMM GRANULOCYTES # BLD AUTO: 0.02 K/UL (ref 0–0.04)
IMM GRANULOCYTES NFR BLD AUTO: 0.3 % (ref 0–0.5)
LYMPHOCYTES # BLD AUTO: 2.4 K/UL (ref 1–4.8)
LYMPHOCYTES NFR BLD: 34.2 % (ref 18–48)
MCH RBC QN AUTO: 29.8 PG (ref 27–31)
MCHC RBC AUTO-ENTMCNC: 32.9 G/DL (ref 32–36)
MCV RBC AUTO: 91 FL (ref 82–98)
MONOCYTES # BLD AUTO: 0.6 K/UL (ref 0.3–1)
MONOCYTES NFR BLD: 8.3 % (ref 4–15)
NEUTROPHILS # BLD AUTO: 3.9 K/UL (ref 1.8–7.7)
NEUTROPHILS NFR BLD: 55.1 % (ref 38–73)
NRBC BLD-RTO: 0 /100 WBC
OHS CV CPX 85 PERCENT MAX PREDICTED HEART RATE MALE: 131
OHS CV CPX ESTIMATED METS: 10.3
OHS CV CPX MAX PREDICTED HEART RATE: 154
OHS CV CPX PATIENT IS FEMALE: 0
OHS CV CPX PATIENT IS MALE: 1
OHS CV CPX PEAK DIASTOLIC BLOOD PRESSURE: 92 MMHG
OHS CV CPX PEAK HEAR RATE: 142 BPM
OHS CV CPX PEAK RATE PRESSURE PRODUCT: NORMAL
OHS CV CPX PEAK SYSTOLIC BLOOD PRESSURE: 184 MMHG
OHS CV CPX PERCENT MAX PREDICTED HEART RATE ACHIEVED: 92
OHS CV CPX RATE PRESSURE PRODUCT PRESENTING: 8476
PLATELET # BLD AUTO: 200 K/UL (ref 150–350)
PMV BLD AUTO: 9.5 FL (ref 9.2–12.9)
POTASSIUM SERPL-SCNC: 3.7 MMOL/L (ref 3.5–5.1)
RBC # BLD AUTO: 5.23 M/UL (ref 4.6–6.2)
SODIUM SERPL-SCNC: 142 MMOL/L (ref 136–145)
STRESS ECHO POST EXERCISE DUR MIN: 8 MINUTES
STRESS ECHO POST EXERCISE DUR SEC: 0 SECONDS
STRESS ECHO TARGET HR: 131 BPM
SYSTOLIC BLOOD PRESSURE: 163 MMHG
TROPONIN I SERPL DL<=0.01 NG/ML-MCNC: <0.03 NG/ML
TROPONIN I SERPL DL<=0.01 NG/ML-MCNC: <0.03 NG/ML
WBC # BLD AUTO: 7.14 K/UL (ref 3.9–12.7)

## 2020-05-26 PROCEDURE — 84484 ASSAY OF TROPONIN QUANT: CPT | Mod: 91

## 2020-05-26 PROCEDURE — 63600175 PHARM REV CODE 636 W HCPCS: Performed by: NURSE PRACTITIONER

## 2020-05-26 PROCEDURE — 96374 THER/PROPH/DIAG INJ IV PUSH: CPT

## 2020-05-26 PROCEDURE — G0378 HOSPITAL OBSERVATION PER HR: HCPCS | Mod: CS

## 2020-05-26 PROCEDURE — 25000003 PHARM REV CODE 250: Performed by: NURSE PRACTITIONER

## 2020-05-26 PROCEDURE — 80048 BASIC METABOLIC PNL TOTAL CA: CPT

## 2020-05-26 PROCEDURE — 36415 COLL VENOUS BLD VENIPUNCTURE: CPT

## 2020-05-26 PROCEDURE — G0378 HOSPITAL OBSERVATION PER HR: HCPCS

## 2020-05-26 PROCEDURE — 85025 COMPLETE CBC W/AUTO DIFF WBC: CPT

## 2020-05-26 PROCEDURE — 94761 N-INVAS EAR/PLS OXIMETRY MLT: CPT

## 2020-05-26 PROCEDURE — 96375 TX/PRO/DX INJ NEW DRUG ADDON: CPT

## 2020-05-26 PROCEDURE — 25000003 PHARM REV CODE 250: Performed by: FAMILY MEDICINE

## 2020-05-26 PROCEDURE — 93017 CV STRESS TEST TRACING ONLY: CPT

## 2020-05-26 PROCEDURE — A9502 TC99M TETROFOSMIN: HCPCS

## 2020-05-26 RX ORDER — POTASSIUM CHLORIDE 20 MEQ/1
40 TABLET, EXTENDED RELEASE ORAL
Status: DISCONTINUED | OUTPATIENT
Start: 2020-05-26 | End: 2020-05-27 | Stop reason: HOSPADM

## 2020-05-26 RX ORDER — DIPHENHYDRAMINE HCL 25 MG
50 CAPSULE ORAL
Status: DISCONTINUED | OUTPATIENT
Start: 2020-05-26 | End: 2020-05-27 | Stop reason: HOSPADM

## 2020-05-26 RX ORDER — POTASSIUM CHLORIDE 7.45 MG/ML
40 INJECTION INTRAVENOUS
Status: DISCONTINUED | OUTPATIENT
Start: 2020-05-26 | End: 2020-05-27 | Stop reason: HOSPADM

## 2020-05-26 RX ORDER — LANOLIN ALCOHOL/MO/W.PET/CERES
800 CREAM (GRAM) TOPICAL
Status: DISCONTINUED | OUTPATIENT
Start: 2020-05-26 | End: 2020-05-27 | Stop reason: HOSPADM

## 2020-05-26 RX ORDER — MAGNESIUM SULFATE HEPTAHYDRATE 40 MG/ML
2 INJECTION, SOLUTION INTRAVENOUS
Status: DISCONTINUED | OUTPATIENT
Start: 2020-05-26 | End: 2020-05-27 | Stop reason: HOSPADM

## 2020-05-26 RX ORDER — POTASSIUM CHLORIDE 20 MEQ/1
20 TABLET, EXTENDED RELEASE ORAL
Status: DISCONTINUED | OUTPATIENT
Start: 2020-05-26 | End: 2020-05-27 | Stop reason: HOSPADM

## 2020-05-26 RX ORDER — MAGNESIUM SULFATE 1 G/100ML
1 INJECTION INTRAVENOUS ONCE
Status: COMPLETED | OUTPATIENT
Start: 2020-05-26 | End: 2020-05-26

## 2020-05-26 RX ORDER — HEPARIN SODIUM 5000 [USP'U]/ML
5000 INJECTION, SOLUTION INTRAVENOUS; SUBCUTANEOUS EVERY 8 HOURS
Status: DISCONTINUED | OUTPATIENT
Start: 2020-05-26 | End: 2020-05-27 | Stop reason: HOSPADM

## 2020-05-26 RX ORDER — POTASSIUM CHLORIDE 7.45 MG/ML
20 INJECTION INTRAVENOUS
Status: DISCONTINUED | OUTPATIENT
Start: 2020-05-26 | End: 2020-05-27 | Stop reason: HOSPADM

## 2020-05-26 RX ORDER — MAGNESIUM SULFATE HEPTAHYDRATE 40 MG/ML
4 INJECTION, SOLUTION INTRAVENOUS
Status: DISCONTINUED | OUTPATIENT
Start: 2020-05-26 | End: 2020-05-27 | Stop reason: HOSPADM

## 2020-05-26 RX ORDER — MAGNESIUM SULFATE 1 G/100ML
1 INJECTION INTRAVENOUS
Status: DISCONTINUED | OUTPATIENT
Start: 2020-05-26 | End: 2020-05-27 | Stop reason: HOSPADM

## 2020-05-26 RX ORDER — SODIUM CHLORIDE 9 MG/ML
INJECTION, SOLUTION INTRAVENOUS ONCE
Status: COMPLETED | OUTPATIENT
Start: 2020-05-26 | End: 2020-05-26

## 2020-05-26 RX ADMIN — SODIUM CHLORIDE: 0.9 INJECTION, SOLUTION INTRAVENOUS at 09:05

## 2020-05-26 RX ADMIN — MAGNESIUM SULFATE 1 G: 1 INJECTION INTRAVENOUS at 09:05

## 2020-05-26 RX ADMIN — HEPARIN SODIUM 5000 UNITS: 5000 INJECTION INTRAVENOUS; SUBCUTANEOUS at 09:05

## 2020-05-26 RX ADMIN — POTASSIUM CHLORIDE 20 MEQ: 20 TABLET, EXTENDED RELEASE ORAL at 06:05

## 2020-05-26 RX ADMIN — ASPIRIN 81 MG 81 MG: 81 TABLET ORAL at 09:05

## 2020-05-26 NOTE — SUBJECTIVE & OBJECTIVE
Past Medical History:   Diagnosis Date    Gout     Hypertension     Kidney stone        Past Surgical History:   Procedure Laterality Date    adnoidectomy      APPENDECTOMY      COLON SURGERY      colon rescection benign polyp    tonsillectomy         Review of patient's allergies indicates:   Allergen Reactions    No known drug allergies        No current facility-administered medications on file prior to encounter.      Current Outpatient Medications on File Prior to Encounter   Medication Sig    ascorbic acid, vitamin C, (VITAMIN C) 500 MG tablet Take 1 tablet (500 mg total) by mouth once daily.    aspirin 81 MG Chew Take 1 tablet (81 mg total) by mouth once daily. (Patient not taking: Reported on 12/30/2019)    atenoloL (TENORMIN) 50 MG tablet Take 1 tablet by mouth once daily    coenzyme Q10 10 mg capsule Take 10 mg by mouth once daily.    glucosamine &chondroit-mv-min3 788-460-79-0.5 mg Tab Take 1 tablet by mouth once daily.      saw palmetto 80 MG capsule Take 1 tablet by mouth Daily. No Sig Provided    [DISCONTINUED] allopurinol (ZYLOPRIM) 300 MG tablet Take 1 tablet (300 mg total) by mouth once daily.    [DISCONTINUED] fish oil-omega-3 fatty acids 300-1,000 mg capsule Take 1 g by mouth once daily.      [DISCONTINUED] multivitamin capsule Take 1 capsule by mouth once daily.      [DISCONTINUED] pravastatin (PRAVACHOL) 20 MG tablet Take 1 tablet (20 mg total) by mouth once daily. (Patient not taking: Reported on 12/30/2019)    [DISCONTINUED] pravastatin (PRAVACHOL) 40 MG tablet Take 1 tablet (40 mg total) by mouth once daily.    [DISCONTINUED] psyllium (METAMUCIL) packet Take 1 packet by mouth once daily.     Family History     Problem Relation (Age of Onset)    Diabetes Mother    Hypertension Mother    No Known Problems Father        Tobacco Use    Smoking status: Never Smoker    Smokeless tobacco: Never Used   Substance and Sexual Activity    Alcohol use: No    Drug use: No    Sexual  activity: Not on file     Review of Systems   All other systems reviewed and are negative.    Objective:     Vital Signs (Most Recent):  Temp: 98.6 °F (37 °C) (05/25/20 2016)  Pulse: 64 (05/25/20 2016)  Resp: 16 (05/25/20 2016)  BP: (!) 195/100 (05/25/20 2016)  SpO2: 98 % (05/25/20 2016) Vital Signs (24h Range):  Temp:  [98.6 °F (37 °C)] 98.6 °F (37 °C)  Pulse:  [64] 64  Resp:  [16] 16  SpO2:  [98 %] 98 %  BP: (195)/(100) 195/100     Weight: 81.6 kg (180 lb)  Body mass index is 25.1 kg/m².    Physical Exam   Constitutional: He is oriented to person, place, and time. He appears well-developed and well-nourished.   HENT:   Head: Normocephalic and atraumatic.   Eyes: Right eye exhibits no discharge. Left eye exhibits no discharge.   Neck: Normal range of motion. Neck supple.   Cardiovascular: Normal rate and regular rhythm.   No murmur heard.  Pulmonary/Chest: Effort normal and breath sounds normal.   Abdominal: Soft. Bowel sounds are normal.   Genitourinary:   Genitourinary Comments: No CVA tenderness   Musculoskeletal: Normal range of motion. He exhibits no edema or deformity.   Neurological: He is alert and oriented to person, place, and time.   Skin: Skin is warm and dry. Capillary refill takes less than 2 seconds. He is not diaphoretic.   Psychiatric: He has a normal mood and affect.   Vitals reviewed.          Significant Labs:   CBC:   Recent Labs   Lab 05/25/20 2031   WBC 7.95   HGB 16.0   HCT 49.3        CMP:   Recent Labs   Lab 05/25/20 2031   *   K 3.5      CO2 26   *   BUN 15   CREATININE 1.0   CALCIUM 9.4   PROT 7.7   ALBUMIN 4.2   BILITOT 0.6   ALKPHOS 70   AST 26   ALT 29   ANIONGAP 5*   EGFRNONAA >60.0     Cardiac Markers:   Recent Labs   Lab 05/25/20 2031   BNP 67     Coagulation:   Recent Labs   Lab 05/25/20 2031   INR 1.0     Troponin:   Recent Labs   Lab 05/25/20 2031   TROPONINI <0.030       Significant Imaging: I have reviewed all pertinent imaging results/findings  within the past 24 hours.   X-ray Chest Ap Portable    Result Date: 5/25/2020  EXAM DESCRIPTION:  XR CHEST AP PORTABLE CLINICAL HISTORY:  64 years  Male  chest pain COMPARISON:  8/22/2014 FINDINGS: Cardiac size and mediastinal contour appear stable. No new area of infiltrate noted. No pleural fluid or pneumothorax. IMPRESSION: No acute abnormality is identified. Electronically signed by:  Winnie Mcclain MD  5/25/2020 9:38 PM CDT Workstation: [id number]

## 2020-05-26 NOTE — CONSULTS
[facility]  Department of Cardiology  Consult Note      Patient name: Carlos Saravia  MRN: [id number]  Today's Date: 05/26/2020  Admit Date: 5/25/2020                          Consult Requested By: Amandeep Mims MD    SUBJECTIVE     Principal Problem: Chest pain      Reason for Consult: chest pain    From H&P: The patient is 64 years old nonsmoker male with history of hypertension and hyperlipidemia.  He presented to the emergency department with chest pain.  He reports intermittent brief episodes sharp pain in the mid chest area and dull pain in the left upper chest/left arm, about 3-4 times in the past months. PTA, he had moderate to severe dull aching pain in his left axilla, radiated to left arm.  Pain occur while he was watching television.  Pain lasted a few minutes and resolved without any intervention.  He took aspirin at home and subsequently came to the ED.     He denies fever, chills, shortness breath, abdominal pain, nausea, vomiting, diarrhea, urinary symptoms.  He denies any weight changes.  He states that he has been very active.  He states that pain generally occur while he was resting.     He states that he had a stress test several years ago.  He reports compliant with blood pressure medication.  He states that his blood pressure has been fluctuating.  He is currently on atenolol 50 mg daily.        History of Present Illness:    Mr. Saravia is a 64-year-old male who presented to the emergency room with complaints of intermittent brief sharp chest discomfort to the mid chest and dull discomfort to the left upper chest and left arm.  Patient reported that the discomfort had been off and on over the last 3-4 months but most recently he had a moderate to severe dull aching on the left axilla that radiated to his left arm occurring while he watch TV which brought him to the emergency room.  Nothing seemed to exacerbate or relieve the discomfort and it only lasted a few minutes.    Patient  has a known history of hypertension and has been on atenolol.    Patient underwent nuclear stress testing which showed a diminished tracer accumulation in the lateral wall slightly more pronounced on the stress examination as compared to the resting study.  Findings correlate with probable lateral wall reversible ischemia.  There was also a matched diminished tracer accumulation in the inferior wall on rest and stress images.      Review of patient's allergies indicates:   Allergen Reactions    No known drug allergies        Past Medical History:   Diagnosis Date    Gout     Hypertension     Kidney stone      Past Surgical History:   Procedure Laterality Date    adnoidectomy      APPENDECTOMY      COLON SURGERY      colon rescection benign polyp    tonsillectomy       Social History     Tobacco Use    Smoking status: Never Smoker    Smokeless tobacco: Never Used   Substance Use Topics    Alcohol use: No    Drug use: No        REVIEW OF SYSTEMS  Constitutional: Negative for chills, fatigue and fever.   Eyes: No double vision, No blurred vision  Neuro: No headaches, No dizziness  Respiratory: Negative for cough, shortness of breath and wheezing.    Cardiovascular:  Positive for left-sided dull chest/axilla discomfort that radiated to the left arm.  Positive for intermittent mid sharp chest discomfort. Negative for palpitations and leg swelling.   Gastrointestinal: Negative for abdominal pain, No melena, diarrhea, nausea and vomiting.   Genitourinary: Negative for dysuria and frequency, Negative for hematuria  Skin: Negative for bruising, Negative for edema or discoloration noted.   Endocrine: Negative for polyphagia, Negative for heat intolerance, Negative for cold intolerance  Psychiatric: Negative for depression, Negative for anxiety, Negative for memory loss  Musculoskeletal: Negative for neck pain, Negative for muscle weakness, Negative for back pain     OBJECTIVE     Vital Signs (Most Recent)  Temp:  97.9 °F (36.6 °C) (05/26/20 1000)  Pulse: (!) 55 (05/26/20 1014)  Resp: 18 (05/26/20 1014)  BP: 119/74 (05/26/20 1000)  SpO2: 95 % (05/26/20 1014)    I & O (Last 24H):No intake or output data in the 24 hours ending 05/26/20 1532    WEIGHTS LAST 4 READINGS  Wt Readings from Last 4 Encounters:   05/26/20 82.3 kg (181 lb 7 oz)   12/30/19 80.8 kg (178 lb 3.2 oz)   06/24/19 83.2 kg (183 lb 8 oz)   12/21/18 82.1 kg (181 lb)       PHYSICAL EXAM  GENERAL: well built, well nourished, well-developed in no apparent distress alert and oriented.   HEENT: Normocephalic. Pupils normal and conjunctivae normal.  Mucous membranes normal, no cyanosis, trachea central,no pallor  NECK: No JVD. No bruit..   CARDIAC: Regular rate and rhythm. S1 is normal.S2 is normal.No gallops, clicks or murmurs noted at this time.  CHEST ANATOMY: normal.   LUNGS: Clear to auscultation. No wheezing or rhonchi..    ABDOMEN: Soft, Normal bowel sounds.   URINARY: No andrade catheter   EXTREMITIES: No cyanosis, clubbing or edema noted at this time., no calf tenderness bilaterally.   PERIPHERAL VASCULAR SYSTEM: Good palpable distal pulses.   CENTRAL NERVOUS SYSTEM: No focal motor or sensory deficits noted.   SKIN: Skin without lesions, moist, well perfused.   MUSCLE STRENGTH & TONE: No noteable weakness, atrophy or abnormal movement.     Home Medications:  No current facility-administered medications on file prior to encounter.      Current Outpatient Medications on File Prior to Encounter   Medication Sig Dispense Refill    ascorbic acid, vitamin C, (VITAMIN C) 500 MG tablet Take 1 tablet (500 mg total) by mouth once daily.      aspirin 81 MG Chew Take 1 tablet (81 mg total) by mouth once daily.  0    atenoloL (TENORMIN) 50 MG tablet Take 1 tablet by mouth once daily 90 tablet 0    biotin 5,000 mcg TbDL Take by mouth once daily.      coenzyme Q10 10 mg capsule Take 10 mg by mouth once daily.      cranberry 500 mg Cap Take 1,500 mg by mouth 3 (three)  times a week.      glucosamine &chondroit-mv-min3 623-815-29-0.5 mg Tab Take 1 tablet by mouth once daily.        saw palmetto 80 MG capsule Take 1 tablet by mouth Daily. No Sig Provided         Scheduled Meds:   aspirin  81 mg Oral Daily       Continuous Infusions:    PRN Meds:acetaminophen, bisacodyL, calcium chloride IVPB, calcium chloride IVPB, calcium chloride IVPB, magnesium oxide, magnesium sulfate IVPB, magnesium sulfate IVPB, magnesium sulfate IVPB, magnesium sulfate IVPB, nitroGLYCERIN, ondansetron, potassium chloride in water, potassium chloride in water, potassium chloride in water, potassium chloride in water, potassium chloride, potassium chloride, potassium chloride, potassium chloride, promethazine (PHENERGAN) IVPB, sodium chloride 0.9%, sodium phosphate IVPB, sodium phosphate IVPB, sodium phosphate IVPB, sodium phosphate IVPB, sodium phosphate IVPB    LABS AND DIAGNOSTICS     CBC LAST 3 DAYS  Recent Labs   Lab 05/25/20 2031 05/26/20 0236   WBC 7.95 7.14   RBC 5.44 5.23   HGB 16.0 15.6   HCT 49.3 47.4   MCV 91 91   MCH 29.4 29.8   MCHC 32.5 32.9   RDW 13.1 13.1    200   MPV 9.7 9.5   GRAN 55.9  4.4 55.1  3.9   LYMPH 33.8  2.7 34.2  2.4   MONO 7.9  0.6 8.3  0.6   BASO 0.05 0.05   NRBC 0 0       COAGULATION LAST 3 DAYS  Recent Labs   Lab 05/25/20 2031   LABPT 13.1   INR 1.0       CHEMISTRY LAST 3 DAYS  Recent Labs   Lab 05/25/20 2031 05/26/20  0236   * 142   K 3.5 3.7    107   CO2 26 25   ANIONGAP 5* 10   BUN 15 14   CREATININE 1.0 1.0   * 99   CALCIUM 9.4 9.1   ALBUMIN 4.2  --    PROT 7.7  --    ALKPHOS 70  --    ALT 29  --    AST 26  --    BILITOT 0.6  --        CARDIAC PROFILE LAST 3 DAYS  Recent Labs   Lab 05/25/20 2031 05/26/20 0236 05/26/20  1128   BNP 67  --   --    TROPONINI <0.030 <0.030 <0.030       ENDOCRINE LAST 3 DAYS  No results for input(s): TSH, PROCAL in the last 168 hours.    LAST ARTERIAL BLOOD GAS  ABG  No results for input(s): PH, PO2, PCO2,  HCO3, BE in the last 168 hours.    LAST 7 DAYS MICROBIOLOGY   Microbiology Results (last 7 days)     ** No results found for the last 168 hours. **          MOST RECENT IMAGING  NM Myocardial Perfusion Spect Multi Pharmacologic  MYOCARDIAL PERFUSION SCAN WITH EJECTION FRACTION CALCULATION    CLINICAL DATA: Chest pain    RADIOPHARMACEUTICAL: 10.6 mCi Technetium 99m sestamibi at rest; 26.9  mCi Technetium 99m sestamibi following exercise stress.    PROCEDURE: Exercise stress/rest myocardial profusion scintigraphy was  performed utilizing the Leonidas treadmill protocol for the stress  portion of the examination. Patient achieved a maximal heart rate of  142 beats per minute, equaling 92 % of expected maximum. Total  exercise time was 8 minutes  , with an achieved workload of 10.3 METs.  Gated tomographic reconstruction images were obtained, with imaging in  the short axis, as well as vertical and horizontal long axes.    FINDINGS: There is abnormally diminished tracer accumulation involving  the inferior and lateral walls on both the stress and resting images.  The inferior wall defect is matched on the stress and resting images.  Normal inferior wall motion is noted. The lateral wall defect appears  slightly more pronounced on the stress examination as compared to the  resting study and may reflect mild lateral wall reversible ischemia.    Chamber size is normal. No focal wall motion abnormality is  identified. Estimated ejection fraction is 58%.    IMPRESSION:    1. Diminished tracer accumulation at the lateral wall is slightly more  pronounced on the stress examination as compared to the resting study.  Findings may reflect lateral wall reversible ischemia. Correlate with  any stress-induced EKG changes.  2. Diminished tracer circulation at the inferior wall is matched on  stress and resting images and may indicate diaphragmatic attenuation.  3. Estimated ejection fraction is 58%.    Electronically Signed by Ronnell  Rg BRITO on 5/26/2020 11:41 AM  Nuclear Stress Test    ECG Stress Nuclear portion of this study will be reported separately.    The EKG portion of this study is negative for ischemia.    The patient reported no chest pain during the stress test.    Arrhythmias during stress: frequent PVCs.      ECHOCARDIOGRAM RESULTS (last 10)  No results found for this or any previous visit.    CURRENT/PREVIOUS VISIT EKG  Results for orders placed or performed during the hospital encounter of 05/25/20   EKG 12-lead    Collection Time: 05/25/20  8:36 PM    Narrative    Test Reason : R07.9,    Vent. Rate : 052 BPM     Atrial Rate : 052 BPM     P-R Int : 148 ms          QRS Dur : 088 ms      QT Int : 456 ms       P-R-T Axes : 064 052 072 degrees     QTc Int : 424 ms    Sinus bradycardia  T wave abnormality, consider anterolateral ischemia  Abnormal ECG  When compared with ECG of 25-MAY-2020 20:15,  Premature ventricular complexes are no longer Present    Referred By: [email or web address]   SELF           Confirmed By:          ASSESSMENT/PLAN:     Active Hospital Problems    Diagnosis    *Chest pain    Hyperlipidemia    Hypertension       Assessment & Plan:     1. Chest pain  2. Abnormal nuclear stress test  3. HTN  4. Hyperlipidemia  5.  Short run of V-tach    Recommendations:    1. Patient underwent nuclear stress testing which showed a diminished tracer accumulation in the lateral wall slightly more pronounced on the stress examination as compared to the resting study.  Findings correlate with probable lateral wall reversible ischemia.  There was also a matched diminished tracer accumulation in the inferior wall on rest and stress images.  Troponin levels were negative x3 sets.  EKG shows nonspecific ST T wave changes.  2.  Options include medical management versus angiogram with possible PCI.  Risk of procedure including but not limited to risk of bleeding, allergic reaction to the dye, kidney injury, vascular damage,  emergency bypass surgery, stroke, heart attack, and even death.  3.  Patient would like to proceed with a left heart catheterization with possible PCI tomorrow morning.  4.  Keep NPO after midnight.  And prep for angiogram.  5.  Give 1 g IV magnesium sulfate.  Check magnesium level.  Goal for magnesium level is 2.0.  6. Thank you for the consultation.  Will follow.        [facility]  Department of Cardiology  Dolores Pollack NP  Date of service: 05/26/2020

## 2020-05-26 NOTE — PLAN OF CARE
05/26/20 1031   BAJWA Message   Medicare Outpatient and Observation Notification regarding financial responsibility Given to patient/caregiver;Explained to patient/caregiver;Signed/date by patient/caregiver   Date BAJWA was signed 05/26/20   Time BAJWA was signed 1030

## 2020-05-26 NOTE — PROGRESS NOTES
@  10:25      OBTAINED STRESS IMAGES.    @   11:30   NUCLEAR MEDICINE MYOPERFUSION STUDY COMPLETED.    GAUTAM LAMB

## 2020-05-26 NOTE — H&P
[facility]  History & Physical  Date of service:5/25/2020  At 2200    Patient Name: Carlos Saravia  MRN: [id number]  Admission Date: 5/25/2020  Attending Physician: Yahaira Guzman MD   Primary Care Provider: Dick Mendez MD         Patient information was obtained from patient and ER records.     Subjective:     Principal Problem:Chest pain    Chief Complaint:   Chief Complaint   Patient presents with    Chest Pain     shooting pain in left arm that comes and goes        HPI: The patient is 64 years old nonsmoker male with history of hypertension and hyperlipidemia.  He presented to the emergency department with chest pain.  He reports intermittent brief episodes sharp pain in the mid chest area and dull pain in the left upper chest/left arm, about 3-4 times in the past months. PTA, he had moderate to severe dull aching pain in his left axilla, radiated to left arm.  Pain occur while he was watching television.  Pain lasted a few minutes and resolved without any intervention.  He took aspirin at home and subsequently came to the ED.    He denies fever, chills, shortness breath, abdominal pain, nausea, vomiting, diarrhea, urinary symptoms.  He denies any weight changes.  He states that he has been very active.  He states that pain generally occur while he was resting.    He states that he had a stress test several years ago.  He reports compliant with blood pressure medication.  He states that his blood pressure has been fluctuating.  He is currently on atenolol 50 mg daily.    in the ED:  Afebrile  Initial blood high, 195-100, improving during the ED stay  WBC 7.95, hemoglobin 16, hematocrit 49.3  CMP unremarkable, potassium 3.5  Troponin negative  BNP 61  EKG:  Initial EKG at 2015: sinus rhythm with frequent PVCs/bigeminy; subsequent EKG showed sinus rhythm with T-wave inversion in lead 1, aVL, V3 to V6, no previous EKG to compare  Cardiac monitor:  Sinus bradycardia, rate in the  40s to 50s    Past Medical History:   Diagnosis Date    Gout     Hypertension     Kidney stone        Past Surgical History:   Procedure Laterality Date    adnoidectomy      APPENDECTOMY      COLON SURGERY      colon rescection benign polyp    tonsillectomy         Review of patient's allergies indicates:   Allergen Reactions    No known drug allergies        No current facility-administered medications on file prior to encounter.      Current Outpatient Medications on File Prior to Encounter   Medication Sig    ascorbic acid, vitamin C, (VITAMIN C) 500 MG tablet Take 1 tablet (500 mg total) by mouth once daily.    aspirin 81 MG Chew Take 1 tablet (81 mg total) by mouth once daily. (Patient not taking: Reported on 12/30/2019)    atenoloL (TENORMIN) 50 MG tablet Take 1 tablet by mouth once daily    coenzyme Q10 10 mg capsule Take 10 mg by mouth once daily.    glucosamine &chondroit-mv-min3 873-883-70-0.5 mg Tab Take 1 tablet by mouth once daily.      saw palmetto 80 MG capsule Take 1 tablet by mouth Daily. No Sig Provided    [DISCONTINUED] allopurinol (ZYLOPRIM) 300 MG tablet Take 1 tablet (300 mg total) by mouth once daily.    [DISCONTINUED] fish oil-omega-3 fatty acids 300-1,000 mg capsule Take 1 g by mouth once daily.      [DISCONTINUED] multivitamin capsule Take 1 capsule by mouth once daily.      [DISCONTINUED] pravastatin (PRAVACHOL) 20 MG tablet Take 1 tablet (20 mg total) by mouth once daily. (Patient not taking: Reported on 12/30/2019)    [DISCONTINUED] pravastatin (PRAVACHOL) 40 MG tablet Take 1 tablet (40 mg total) by mouth once daily.    [DISCONTINUED] psyllium (METAMUCIL) packet Take 1 packet by mouth once daily.     Family History     Problem Relation (Age of Onset)    Diabetes Mother    Hypertension Mother    No Known Problems Father        Tobacco Use    Smoking status: Never Smoker    Smokeless tobacco: Never Used   Substance and Sexual Activity    Alcohol use: No    Drug use:  No    Sexual activity: Not on file     Review of Systems   All other systems reviewed and are negative.    Objective:     Vital Signs (Most Recent):  Temp: 98.6 °F (37 °C) (05/25/20 2016)  Pulse: 64 (05/25/20 2016)  Resp: 16 (05/25/20 2016)  BP: (!) 195/100 (05/25/20 2016)  SpO2: 98 % (05/25/20 2016) Vital Signs (24h Range):  Temp:  [98.6 °F (37 °C)] 98.6 °F (37 °C)  Pulse:  [64] 64  Resp:  [16] 16  SpO2:  [98 %] 98 %  BP: (195)/(100) 195/100     Weight: 81.6 kg (180 lb)  Body mass index is 25.1 kg/m².    Physical Exam   Constitutional: He is oriented to person, place, and time. He appears well-developed and well-nourished.   HENT:   Head: Normocephalic and atraumatic.   Eyes: Right eye exhibits no discharge. Left eye exhibits no discharge.   Neck: Normal range of motion. Neck supple.   Cardiovascular: Normal rate and regular rhythm.   No murmur heard.  Pulmonary/Chest: Effort normal and breath sounds normal.   Abdominal: Soft. Bowel sounds are normal.   Genitourinary:   Genitourinary Comments: No CVA tenderness   Musculoskeletal: Normal range of motion. He exhibits no edema or deformity.   Neurological: He is alert and oriented to person, place, and time.   Skin: Skin is warm and dry. Capillary refill takes less than 2 seconds. He is not diaphoretic.   Psychiatric: He has a normal mood and affect.   Vitals reviewed.          Significant Labs:   CBC:   Recent Labs   Lab 05/25/20 2031   WBC 7.95   HGB 16.0   HCT 49.3        CMP:   Recent Labs   Lab 05/25/20 2031   *   K 3.5      CO2 26   *   BUN 15   CREATININE 1.0   CALCIUM 9.4   PROT 7.7   ALBUMIN 4.2   BILITOT 0.6   ALKPHOS 70   AST 26   ALT 29   ANIONGAP 5*   EGFRNONAA >60.0     Cardiac Markers:   Recent Labs   Lab 05/25/20 2031   BNP 67     Coagulation:   Recent Labs   Lab 05/25/20 2031   INR 1.0     Troponin:   Recent Labs   Lab 05/25/20 2031   TROPONINI <0.030       Significant Imaging: I have reviewed all pertinent imaging  results/findings within the past 24 hours.   X-ray Chest Ap Portable    Result Date: 5/25/2020  EXAM DESCRIPTION:  XR CHEST AP PORTABLE CLINICAL HISTORY:  64 years  Male  chest pain COMPARISON:  8/22/2014 FINDINGS: Cardiac size and mediastinal contour appear stable. No new area of infiltrate noted. No pleural fluid or pneumothorax. IMPRESSION: No acute abnormality is identified. Electronically signed by:  Winnie Mcclain MD  5/25/2020 9:38 PM CDT Workstation: [id number]      Assessment/Plan:     * Chest pain  Reports intermittent brief sharp mid chest pain and dull left upper chest/axilla pain with no associated symptoms  Abnormal EKG with T-wave inversions in lead 1, aVL, V 3 to be 6, no previous EKG to compare  Troponin negative  Has risk factors including hypertension and hyperlipidemia  Not diabetic  Family history of diabetes mellitus, no CAD  Will proceed with a Lexiscan stress test in a.m. given abnormal EKG and risk factors  Continue cardiac monitor  Trend troponin  Aspirin nitroglycerin  Monitor      Hypertension  Reports compliant with medication  Initially high on arrival, improved  Continue home medication  Monitor  May need to adjust his blood pressure medication      Hyperlipidemia  Not on statin, taking over-the-counter medication        VTE Risk Mitigation (From admission, onward)         Ordered     IP VTE LOW RISK PATIENT  Once      05/25/20 2234     Place sequential compression device  Until discontinued      05/25/20 2234                   BASSEM Quiroga  Department of Hospital Medicine   Pending sale to [facility]

## 2020-05-26 NOTE — PROGRESS NOTES
Myocardial Perfusion Stress injection at 08:51 R arm IV    -Treadmill Stress  -Patient released from NPO status from Nuclear Medicine  SHASHI Sabillon [facility]

## 2020-05-26 NOTE — PROGRESS NOTES
Myocardial perfusion  Resting injection @ 07:35 R arm   IV     -Patient to  Remain NPO status  SHASHI Sabillon MT

## 2020-05-26 NOTE — HPI
The patient is 64 years old nonsmoker male with history of hypertension and hyperlipidemia.  He presented to the emergency department with chest pain.  He reports intermittent brief episodes sharp pain in the mid chest area and dull pain in the left upper chest/left arm, about 3-4 times in the past months. PTA, he had moderate to severe dull aching pain in his left axilla, radiated to left arm.  Pain occur while he was watching television.  Pain lasted a few minutes and resolved without any intervention.  He took aspirin at home and subsequently came to the ED.    He denies fever, chills, shortness breath, abdominal pain, nausea, vomiting, diarrhea, urinary symptoms.  He denies any weight changes.  He states that he has been very active.  He states that pain generally occur while he was resting.    He states that he had a stress test several years ago.  He reports compliant with blood pressure medication.  He states that his blood pressure has been fluctuating.  He is currently on atenolol 50 mg daily.    in the ED:  Afebrile  Initial blood high, 195-100, improving during the ED stay  WBC 7.95, hemoglobin 16, hematocrit 49.3  CMP unremarkable, potassium 3.5  Troponin negative  BNP 61  EKG:  Initial EKG at 2015: sinus rhythm with frequent PVCs/bigeminy; subsequent EKG showed sinus rhythm with T-wave inversion in lead 1, aVL, V3 to V6, no previous EKG to compare  Cardiac monitor:  Sinus bradycardia, rate in the 40s to 50s

## 2020-05-26 NOTE — ASSESSMENT & PLAN NOTE
Reports compliant with medication  Initially high on arrival, improved  Continue home medication  Monitor  May need to adjust his blood pressure medication

## 2020-05-26 NOTE — ASSESSMENT & PLAN NOTE
Reports intermittent brief sharp mid chest pain and dull left upper chest/axilla pain with no associated symptoms  Abnormal EKG with T-wave inversions in lead 1, aVL, V 3 to be 6, no previous EKG to compare  Troponin negative  Has risk factors including hypertension and hyperlipidemia  Not diabetic  Family history of diabetes mellitus, no CAD  Will proceed with a Lexiscan stress test in a.m. given abnormal EKG and risk factors  Continue cardiac monitor  Trend troponin  Aspirin nitroglycerin  Monitor

## 2020-05-26 NOTE — NURSING
Pts HR running 38-45bpm. Pts states his normal HR is in the 40's. Pt is asymptomatic. Dr Guzman made aware of HR 38-45bpm. Pts home medication atenolol discontinued. No further orders. Will continue to monitor.

## 2020-05-26 NOTE — PROGRESS NOTES
-Myocardial perfusion resting images in progress at 08:22    -Patient to remain NPO status    SHASHI Sabillon MT

## 2020-05-27 VITALS
DIASTOLIC BLOOD PRESSURE: 91 MMHG | BODY MASS INDEX: 25.4 KG/M2 | OXYGEN SATURATION: 96 % | HEIGHT: 71 IN | WEIGHT: 181.44 LBS | RESPIRATION RATE: 20 BRPM | SYSTOLIC BLOOD PRESSURE: 146 MMHG | TEMPERATURE: 98 F | HEART RATE: 67 BPM

## 2020-05-27 PROBLEM — R07.9 CHEST PAIN: Status: RESOLVED | Noted: 2020-05-25 | Resolved: 2020-05-27

## 2020-05-27 LAB
ANION GAP SERPL CALC-SCNC: 9 MMOL/L (ref 8–16)
ANION GAP SERPL CALC-SCNC: 9 MMOL/L (ref 8–16)
APTT PPP: 26.2 SEC (ref 23.6–33.3)
BASOPHILS # BLD AUTO: 0.04 K/UL (ref 0–0.2)
BASOPHILS # BLD AUTO: 0.04 K/UL (ref 0–0.2)
BASOPHILS NFR BLD: 0.4 % (ref 0–1.9)
BASOPHILS NFR BLD: 0.4 % (ref 0–1.9)
BUN SERPL-MCNC: 14 MG/DL (ref 8–23)
BUN SERPL-MCNC: 14 MG/DL (ref 8–23)
CALCIUM SERPL-MCNC: 8.9 MG/DL (ref 8.7–10.5)
CALCIUM SERPL-MCNC: 8.9 MG/DL (ref 8.7–10.5)
CHLORIDE SERPL-SCNC: 110 MMOL/L (ref 95–110)
CHLORIDE SERPL-SCNC: 110 MMOL/L (ref 95–110)
CHOLEST SERPL-MCNC: 242 MG/DL (ref 120–199)
CHOLEST/HDLC SERPL: 6.4 {RATIO} (ref 2–5)
CO2 SERPL-SCNC: 21 MMOL/L (ref 23–29)
CO2 SERPL-SCNC: 21 MMOL/L (ref 23–29)
CREAT SERPL-MCNC: 0.9 MG/DL (ref 0.5–1.4)
CREAT SERPL-MCNC: 0.9 MG/DL (ref 0.5–1.4)
DIFFERENTIAL METHOD: ABNORMAL
DIFFERENTIAL METHOD: ABNORMAL
EOSINOPHIL # BLD AUTO: 0.1 K/UL (ref 0–0.5)
EOSINOPHIL # BLD AUTO: 0.1 K/UL (ref 0–0.5)
EOSINOPHIL NFR BLD: 0.8 % (ref 0–8)
EOSINOPHIL NFR BLD: 0.8 % (ref 0–8)
ERYTHROCYTE [DISTWIDTH] IN BLOOD BY AUTOMATED COUNT: 13.1 % (ref 11.5–14.5)
ERYTHROCYTE [DISTWIDTH] IN BLOOD BY AUTOMATED COUNT: 13.1 % (ref 11.5–14.5)
EST. GFR  (AFRICAN AMERICAN): >60 ML/MIN/1.73 M^2
EST. GFR  (AFRICAN AMERICAN): >60 ML/MIN/1.73 M^2
EST. GFR  (NON AFRICAN AMERICAN): >60 ML/MIN/1.73 M^2
EST. GFR  (NON AFRICAN AMERICAN): >60 ML/MIN/1.73 M^2
GLUCOSE SERPL-MCNC: 96 MG/DL (ref 70–110)
GLUCOSE SERPL-MCNC: 96 MG/DL (ref 70–110)
HCT VFR BLD AUTO: 48.1 % (ref 40–54)
HCT VFR BLD AUTO: 48.1 % (ref 40–54)
HDLC SERPL-MCNC: 38 MG/DL (ref 40–75)
HDLC SERPL: 15.7 % (ref 20–50)
HGB BLD-MCNC: 16 G/DL (ref 14–18)
HGB BLD-MCNC: 16 G/DL (ref 14–18)
IMM GRANULOCYTES # BLD AUTO: 0.02 K/UL (ref 0–0.04)
IMM GRANULOCYTES # BLD AUTO: 0.02 K/UL (ref 0–0.04)
IMM GRANULOCYTES NFR BLD AUTO: 0.2 % (ref 0–0.5)
IMM GRANULOCYTES NFR BLD AUTO: 0.2 % (ref 0–0.5)
LDLC SERPL CALC-MCNC: 169.4 MG/DL (ref 63–159)
LYMPHOCYTES # BLD AUTO: 1.7 K/UL (ref 1–4.8)
LYMPHOCYTES # BLD AUTO: 1.7 K/UL (ref 1–4.8)
LYMPHOCYTES NFR BLD: 17.5 % (ref 18–48)
LYMPHOCYTES NFR BLD: 17.5 % (ref 18–48)
MAGNESIUM SERPL-MCNC: 2.1 MG/DL (ref 1.6–2.6)
MCH RBC QN AUTO: 29.9 PG (ref 27–31)
MCH RBC QN AUTO: 29.9 PG (ref 27–31)
MCHC RBC AUTO-ENTMCNC: 33.3 G/DL (ref 32–36)
MCHC RBC AUTO-ENTMCNC: 33.3 G/DL (ref 32–36)
MCV RBC AUTO: 90 FL (ref 82–98)
MCV RBC AUTO: 90 FL (ref 82–98)
MONOCYTES # BLD AUTO: 0.7 K/UL (ref 0.3–1)
MONOCYTES # BLD AUTO: 0.7 K/UL (ref 0.3–1)
MONOCYTES NFR BLD: 7.3 % (ref 4–15)
MONOCYTES NFR BLD: 7.3 % (ref 4–15)
NEUTROPHILS # BLD AUTO: 7 K/UL (ref 1.8–7.7)
NEUTROPHILS # BLD AUTO: 7 K/UL (ref 1.8–7.7)
NEUTROPHILS NFR BLD: 73.8 % (ref 38–73)
NEUTROPHILS NFR BLD: 73.8 % (ref 38–73)
NONHDLC SERPL-MCNC: 204 MG/DL
NRBC BLD-RTO: 0 /100 WBC
NRBC BLD-RTO: 0 /100 WBC
PLATELET # BLD AUTO: 192 K/UL (ref 150–350)
PLATELET # BLD AUTO: 192 K/UL (ref 150–350)
PMV BLD AUTO: 9.7 FL (ref 9.2–12.9)
PMV BLD AUTO: 9.7 FL (ref 9.2–12.9)
POTASSIUM SERPL-SCNC: 3.7 MMOL/L (ref 3.5–5.1)
POTASSIUM SERPL-SCNC: 3.7 MMOL/L (ref 3.5–5.1)
RBC # BLD AUTO: 5.35 M/UL (ref 4.6–6.2)
RBC # BLD AUTO: 5.35 M/UL (ref 4.6–6.2)
SODIUM SERPL-SCNC: 140 MMOL/L (ref 136–145)
SODIUM SERPL-SCNC: 140 MMOL/L (ref 136–145)
TRIGL SERPL-MCNC: 173 MG/DL (ref 30–150)
TSH SERPL DL<=0.005 MIU/L-ACNC: 1.45 UIU/ML (ref 0.34–5.6)
WBC # BLD AUTO: 9.48 K/UL (ref 3.9–12.7)
WBC # BLD AUTO: 9.48 K/UL (ref 3.9–12.7)

## 2020-05-27 PROCEDURE — 85730 THROMBOPLASTIN TIME PARTIAL: CPT

## 2020-05-27 PROCEDURE — 25000003 PHARM REV CODE 250: Performed by: NURSE PRACTITIONER

## 2020-05-27 PROCEDURE — 80048 BASIC METABOLIC PNL TOTAL CA: CPT

## 2020-05-27 PROCEDURE — 93458 L HRT ARTERY/VENTRICLE ANGIO: CPT | Performed by: INTERNAL MEDICINE

## 2020-05-27 PROCEDURE — 83735 ASSAY OF MAGNESIUM: CPT

## 2020-05-27 PROCEDURE — 93005 ELECTROCARDIOGRAM TRACING: CPT | Performed by: INTERNAL MEDICINE

## 2020-05-27 PROCEDURE — 99152 MOD SED SAME PHYS/QHP 5/>YRS: CPT | Performed by: INTERNAL MEDICINE

## 2020-05-27 PROCEDURE — 36415 COLL VENOUS BLD VENIPUNCTURE: CPT

## 2020-05-27 PROCEDURE — 84443 ASSAY THYROID STIM HORMONE: CPT

## 2020-05-27 PROCEDURE — 85025 COMPLETE CBC W/AUTO DIFF WBC: CPT

## 2020-05-27 PROCEDURE — 80061 LIPID PANEL: CPT

## 2020-05-27 PROCEDURE — C1887 CATHETER, GUIDING: HCPCS | Performed by: INTERNAL MEDICINE

## 2020-05-27 PROCEDURE — 25000003 PHARM REV CODE 250: Performed by: INTERNAL MEDICINE

## 2020-05-27 PROCEDURE — C1894 INTRO/SHEATH, NON-LASER: HCPCS | Performed by: INTERNAL MEDICINE

## 2020-05-27 PROCEDURE — C1769 GUIDE WIRE: HCPCS | Performed by: INTERNAL MEDICINE

## 2020-05-27 PROCEDURE — G0378 HOSPITAL OBSERVATION PER HR: HCPCS

## 2020-05-27 PROCEDURE — 99153 MOD SED SAME PHYS/QHP EA: CPT | Performed by: INTERNAL MEDICINE

## 2020-05-27 PROCEDURE — 63600175 PHARM REV CODE 636 W HCPCS: Performed by: INTERNAL MEDICINE

## 2020-05-27 PROCEDURE — G0378 HOSPITAL OBSERVATION PER HR: HCPCS | Mod: CS

## 2020-05-27 PROCEDURE — 25500020 PHARM REV CODE 255: Performed by: INTERNAL MEDICINE

## 2020-05-27 RX ORDER — MIDAZOLAM HYDROCHLORIDE 1 MG/ML
INJECTION INTRAMUSCULAR; INTRAVENOUS
Status: DISCONTINUED | OUTPATIENT
Start: 2020-05-27 | End: 2020-05-27 | Stop reason: HOSPADM

## 2020-05-27 RX ORDER — LIDOCAINE HYDROCHLORIDE 10 MG/ML
INJECTION, SOLUTION EPIDURAL; INFILTRATION; INTRACAUDAL; PERINEURAL
Status: DISCONTINUED | OUTPATIENT
Start: 2020-05-27 | End: 2020-05-27 | Stop reason: HOSPADM

## 2020-05-27 RX ORDER — SODIUM CHLORIDE 450 MG/100ML
100 INJECTION, SOLUTION INTRAVENOUS CONTINUOUS
Status: ACTIVE | OUTPATIENT
Start: 2020-05-27 | End: 2020-05-27

## 2020-05-27 RX ORDER — FENTANYL CITRATE 50 UG/ML
INJECTION, SOLUTION INTRAMUSCULAR; INTRAVENOUS
Status: DISCONTINUED | OUTPATIENT
Start: 2020-05-27 | End: 2020-05-27 | Stop reason: HOSPADM

## 2020-05-27 RX ADMIN — DIPHENHYDRAMINE HYDROCHLORIDE 50 MG: 25 CAPSULE ORAL at 08:05

## 2020-05-27 NOTE — NURSING
Pt. Discharged home via walking to family members private vehicle. Pt. Refused wheelchair. Pt. Has all personal belongings and discharge paper work. Pt. VSS

## 2020-05-27 NOTE — DISCHARGE SUMMARY
[facility] Medicine  Discharge Summary      Patient Name: Carlos Saravia  MRN: [id number]  Admission Date: 5/25/2020  Hospital Length of Stay: 0 days  Discharge Date and Time:  05/27/2020 4:39 PM  Attending Physician: Morales Chavez MD   Discharging Provider: Morales Chavez MD  Primary Care Provider: Dick Mendez MD      HPI:   The patient is 64 years old nonsmoker male with history of hypertension and hyperlipidemia.  He presented to the emergency department with chest pain.  He reports intermittent brief episodes sharp pain in the mid chest area and dull pain in the left upper chest/left arm, about 3-4 times in the past months. PTA, he had moderate to severe dull aching pain in his left axilla, radiated to left arm.  Pain occur while he was watching television.  Pain lasted a few minutes and resolved without any intervention.  He took aspirin at home and subsequently came to the ED.    He denies fever, chills, shortness breath, abdominal pain, nausea, vomiting, diarrhea, urinary symptoms.  He denies any weight changes.  He states that he has been very active.  He states that pain generally occur while he was resting.    He states that he had a stress test several years ago.  He reports compliant with blood pressure medication.  He states that his blood pressure has been fluctuating.  He is currently on atenolol 50 mg daily.    in the ED:  Afebrile  Initial blood high, 195-100, improving during the ED stay  WBC 7.95, hemoglobin 16, hematocrit 49.3  CMP unremarkable, potassium 3.5  Troponin negative  BNP 61  EKG:  Initial EKG at 2015: sinus rhythm with frequent PVCs/bigeminy; subsequent EKG showed sinus rhythm with T-wave inversion in lead 1, aVL, V3 to V6, no previous EKG to compare  Cardiac monitor:  Sinus bradycardia, rate in the 40s to 50s    Procedure(s) (LRB):  Left heart cath (Left)      Hospital Course:   05/27 Went to [facility], without intervention. Discussed with Cardiology: discharge  "home with outpatient follow-up. No further pain since admission. Patient feels "Really good".  VSS  Lungs: clear  Heart: S1S2 reg  Abdo: soft     Consults:   Consults (From admission, onward)        Status Ordering Provider     Inpatient consult to Cardiology  Once     Provider:  Nick Kerr MD    Completed RONY BISHOP          No new Assessment & Plan notes have been filed under this hospital service since the last note was generated.  Service: Hospital Medicine    Final Active Diagnoses:    Diagnosis Date Noted POA    Hyperlipidemia [E78.5] 06/24/2019 Unknown    Hypertension [I10] 12/21/2017 Yes      Problems Resolved During this Admission:    Diagnosis Date Noted Date Resolved POA    PRINCIPAL PROBLEM:  Chest pain [R07.9] 05/25/2020 05/27/2020 Yes       Discharged Condition: good    Disposition:     Follow Up:  Follow-up Information     Nick Kerr MD In 1 week.    Specialties:  Cardiovascular Disease, Cardiology, INTERVENTIONAL CARDIOLOGY  Why:  post discharge follow-up  Contact information:  [address] [place] [address]  [address] [place] [id number]  [phone]                 Patient Instructions:      Diet Cardiac     Activity as tolerated       Significant Diagnostic Studies: Labs:   BMP:   Recent Labs   Lab 05/25/20 2031 05/26/20  0236 05/27/20  0443   * 99 96  96   * 142 140  140   K 3.5 3.7 3.7  3.7    107 110  110   CO2 26 25 21*  21*   BUN 15 14 14  14   CREATININE 1.0 1.0 0.9  0.9   CALCIUM 9.4 9.1 8.9  8.9   MG  --   --  2.1   , CBC   Recent Labs   Lab 05/25/20 2031 05/26/20  0236 05/27/20  0812   WBC 7.95 7.14 9.48  9.48   HGB 16.0 15.6 16.0  16.0   HCT 49.3 47.4 48.1  48.1    200 192  192    and Troponin   Recent Labs   Lab 05/26/20  1128   TROPONINI <0.030       Pending Diagnostic Studies:     None         Medications:  Reconciled Home Medications:      Medication List      CONTINUE taking these medications    ascorbic acid (vitamin C) 500 MG " tablet  Commonly known as:  VITAMIN C  Take 1 tablet (500 mg total) by mouth once daily.     aspirin 81 MG Chew  Take 1 tablet (81 mg total) by mouth once daily.     atenoloL 50 MG tablet  Commonly known as:  TENORMIN  Take 1 tablet by mouth once daily     biotin 5,000 mcg Tbdl  Take by mouth once daily.     coenzyme Q10 10 mg capsule  Take 10 mg by mouth once daily.     cranberry 500 mg Cap  Take 1,500 mg by mouth 3 (three) times a week.     glucosamine-chondroit-mv-min3 288-882-06-0.5 mg Tab  Take 1 tablet by mouth once daily.     saw palmetto 80 MG capsule  Take 1 tablet by mouth Daily. No Sig Provided            Indwelling Lines/Drains at time of discharge:   Lines/Drains/Airways     None                 Time spent on the discharge of patient: 32 minutes  Patient was seen and examined on the date of discharge and determined to be suitable for discharge.         Morales Chavez MD  Department of Hospital Medicine  [facility]

## 2020-05-27 NOTE — PROGRESS NOTES
[facility] Medicine  Progress Note    Patient Name: Carlos Saravia  MRN: [id number]  Admission Date: 5/25/2020  Attending Physician: Amandeep Mims MD   Primary Care Provider: Dick Mendez MD  Date of service:  05/26/2020    Subjective:     Principal Problem:Chest pain    Chief Complaint:   Chief Complaint   Patient presents with    Chest Pain     shooting pain in left arm that comes and goes        HPI: The patient is 64 years old nonsmoker male with history of hypertension and hyperlipidemia.  He presented to the emergency department with chest pain.  He reports intermittent brief episodes sharp pain in the mid chest area and dull pain in the left upper chest/left arm, about 3-4 times in the past months. PTA, he had moderate to severe dull aching pain in his left axilla, radiated to left arm.  Pain occur while he was watching television.  Pain lasted a few minutes and resolved without any intervention.  He took aspirin at home and subsequently came to the ED.    He denies fever, chills, shortness breath, abdominal pain, nausea, vomiting, diarrhea, urinary symptoms.  He denies any weight changes.  He states that he has been very active.  He states that pain generally occur while he was resting.    He states that he had a stress test several years ago.  He reports compliant with blood pressure medication.  He states that his blood pressure has been fluctuating.  He is currently on atenolol 50 mg daily.    in the ED:  Afebrile  Initial blood high, 195-100, improving during the ED stay  WBC 7.95, hemoglobin 16, hematocrit 49.3  CMP unremarkable, potassium 3.5  Troponin negative  BNP 61  EKG:  Initial EKG at 2015: sinus rhythm with frequent PVCs/bigeminy; subsequent EKG showed sinus rhythm with T-wave inversion in lead 1, aVL, V3 to V6, no previous EKG to compare  Cardiac monitor:  Sinus bradycardia, rate in the 40s to 50s    Interval history:  Today the patient reports chest pain is  resolved without recurrence.  No episodes of chest pain today.  Denies shortness of breath.  No fever or chills.  No nausea or vomiting.  Mobilizing without difficulty.  He reports chest pain episodes have been occurring intermittently and typically occur while lying flat.  He denies any exertional chest pain.    Of note beta-blocker held overnight secondary to bradycardia.    Physical exam:  Vital signs reviewed:  Heart rate 58  General:  Comfortable appearing, nontoxic, no apparent distress  ENT:  Moist mucous membranes  Head and eyes:  Anicteric sclerae, no conjunctival discharge  Pulmonary:  Comfortable work of breathing, speaking in complete sentences without difficulty  Cardiovascular:  No pedal edema, regular rate  GI:  Abdomen is Nondistended.  Skin:  Dry and warm, no jaundice  Psych:  Mood is agitated, insight poor, affect labile  Neuro:  Nonfocal motor exam, fluent speech, alert and oriented    Laboratory data:  Hemoglobin 15  Hematocrit 47  Sodium 142  Potassium 3.7  Troponin negative    Nuclear Stress Test Impression:  1. Diminished tracer accumulation at the lateral wall is slightly more pronounced on the stress examination as compared to the resting study. Findings may reflect lateral wall reversible ischemia. Correlate with any stress-induced EKG changes.  2. Diminished tracer circulation at the inferior wall is matched on stress and resting images and may indicate diaphragmatic attenuation.  3. Estimated ejection fraction is 58%.    Assessment/Plan:     Chest pain with positive stress test  Sinus bradycardia  Abnormal EKG  Reports intermittent brief sharp mid chest pain and dull left upper chest/axilla pain with no associated symptoms  Abnormal EKG with T-wave inversions in lead 1, aVL, V 3 to be 6, no previous EKG to compare  Troponin negative  Has risk factors including hypertension and hyperlipidemia  Not diabetic  Family history of diabetes mellitus, no CAD  Will proceed with a Lexiscan stress  test in a.m. given abnormal EKG and risk factors  Continue cardiac monitor  Trend troponin  Aspirin nitroglycerin  Monitor      Hyperlipidemia  Not on statin, taking over-the-counter medication        Hypertension  Reports compliant with medication  Initially high on arrival, improved  Continue home medication  Monitor  May need to adjust his blood pressure medication    Hypokalemia    Plan update today:  Cardiology consultation for abnormal stress test.  The patient will likely need angiogram.  Continue medical management with Aspirin therapy.  Avoid beta-blocker secondary to sinus bradycardia.  Monitor blood pressure and treat with nitrates/ACE-inhibitor as needed.  Monitor electrolytes and replace as needed.  Repeat a.m. labs.  Check lipid profile.  Consider statin therapy.  Limit activity level until cardiac workup complete  Nitroglycerin as needed for chest pain symptoms.  IV morphine as needed for breakthrough chest pain should occur.  Continue medications for chronic issues as able  VTE prophylaxis with SCDs and subQ heparin  Moderate risk secondary to moderate exacerbation of chronic illness; prescription drug management; cardiac physiologic stress testing with pending invasive angiogram in near future with risk factors    During my interview with the patient he became extremely upset upon hearing results of abnormal stress test and need for continued hospitalization for Cardiology evaluation.  The patient expressed his dissatisfaction in a very belligerent tone, and therefore I instructed the patient that he will have a different hospitalist physician assigned to follow-up with him tomorrow.  I discussed the case with the nurse.  Cardiology consultation ordered.  I discussed the case with hospitalist director Dr. Chavez.    VTE Risk Mitigation (From admission, onward)         Ordered     heparin (porcine) injection 5,000 Units  Every 8 hours      05/26/20 1811     IP VTE LOW RISK PATIENT  Once       05/25/20 2234     Place sequential compression device  Until discontinued      05/25/20 2234                   Amandeep Mims MD  Department of Hospital Medicine   [facility]

## 2020-05-27 NOTE — HOSPITAL COURSE
"05/27 Went to [facility], without intervention. Discussed with Cardiology: discharge home with outpatient follow-up. No further pain since admission. Patient feels "Really good".  VSS  Lungs: clear  Heart: S1S2 reg  Abdo: soft  "

## 2020-06-03 ENCOUNTER — DOCUMENTATION ONLY (OUTPATIENT)
Dept: FAMILY MEDICINE | Facility: CLINIC | Age: 65
End: 2020-06-03

## 2020-06-03 LAB — CATH EF QUANTITATIVE: 60 %

## 2020-06-03 NOTE — PROGRESS NOTES
Discussed with patient's wife he was seen in the ER with chest pain was admitted overnight had a stress test and subsequently angiogram which was negative he is on aspirin but still having intermittent chest pains will need to come in for follow-up. Has increased BP meds and its still.

## 2020-06-24 ENCOUNTER — LAB VISIT (OUTPATIENT)
Dept: LAB | Facility: HOSPITAL | Age: 65
End: 2020-06-24
Attending: FAMILY MEDICINE
Payer: MEDICARE

## 2020-06-24 DIAGNOSIS — I10 ESSENTIAL HYPERTENSION: ICD-10-CM

## 2020-06-24 DIAGNOSIS — E78.2 MIXED HYPERLIPIDEMIA: ICD-10-CM

## 2020-06-24 LAB
ALBUMIN SERPL BCP-MCNC: 4.1 G/DL (ref 3.5–5.2)
ALP SERPL-CCNC: 62 U/L (ref 55–135)
ALT SERPL W/O P-5'-P-CCNC: 26 U/L (ref 10–44)
ANION GAP SERPL CALC-SCNC: 9 MMOL/L (ref 8–16)
AST SERPL-CCNC: 24 U/L (ref 10–40)
BILIRUB SERPL-MCNC: 0.9 MG/DL (ref 0.1–1)
BUN SERPL-MCNC: 14 MG/DL (ref 8–23)
CALCIUM SERPL-MCNC: 9.2 MG/DL (ref 8.7–10.5)
CHLORIDE SERPL-SCNC: 106 MMOL/L (ref 95–110)
CHOLEST SERPL-MCNC: 149 MG/DL (ref 120–199)
CHOLEST/HDLC SERPL: 3.5 {RATIO} (ref 2–5)
CO2 SERPL-SCNC: 23 MMOL/L (ref 23–29)
CREAT SERPL-MCNC: 0.9 MG/DL (ref 0.5–1.4)
EST. GFR  (AFRICAN AMERICAN): >60 ML/MIN/1.73 M^2
EST. GFR  (NON AFRICAN AMERICAN): >60 ML/MIN/1.73 M^2
GLUCOSE SERPL-MCNC: 90 MG/DL (ref 70–110)
HDLC SERPL-MCNC: 43 MG/DL (ref 40–75)
HDLC SERPL: 28.9 % (ref 20–50)
LDLC SERPL CALC-MCNC: 87.4 MG/DL (ref 63–159)
NONHDLC SERPL-MCNC: 106 MG/DL
POTASSIUM SERPL-SCNC: 3.6 MMOL/L (ref 3.5–5.1)
PROT SERPL-MCNC: 7.2 G/DL (ref 6–8.4)
SODIUM SERPL-SCNC: 138 MMOL/L (ref 136–145)
TRIGL SERPL-MCNC: 93 MG/DL (ref 30–150)

## 2020-06-24 PROCEDURE — 80061 LIPID PANEL: CPT

## 2020-06-24 PROCEDURE — 80053 COMPREHEN METABOLIC PANEL: CPT

## 2020-06-24 PROCEDURE — 36415 COLL VENOUS BLD VENIPUNCTURE: CPT

## 2020-06-30 ENCOUNTER — OFFICE VISIT (OUTPATIENT)
Dept: FAMILY MEDICINE | Facility: CLINIC | Age: 65
End: 2020-06-30
Payer: MEDICARE

## 2020-06-30 VITALS
TEMPERATURE: 98 F | BODY MASS INDEX: 24.82 KG/M2 | DIASTOLIC BLOOD PRESSURE: 74 MMHG | HEART RATE: 58 BPM | HEIGHT: 71 IN | OXYGEN SATURATION: 99 % | WEIGHT: 177.31 LBS | SYSTOLIC BLOOD PRESSURE: 134 MMHG

## 2020-06-30 DIAGNOSIS — D22.9 MELANOCYTIC NEVUS OF SKIN: ICD-10-CM

## 2020-06-30 DIAGNOSIS — E78.5 HYPERLIPIDEMIA, UNSPECIFIED HYPERLIPIDEMIA TYPE: Primary | ICD-10-CM

## 2020-06-30 DIAGNOSIS — I10 ESSENTIAL HYPERTENSION: ICD-10-CM

## 2020-06-30 DIAGNOSIS — N52.9 ED (ERECTILE DYSFUNCTION) OF ORGANIC ORIGIN: ICD-10-CM

## 2020-06-30 PROCEDURE — 99214 OFFICE O/P EST MOD 30 MIN: CPT | Performed by: FAMILY MEDICINE

## 2020-06-30 PROCEDURE — 99214 PR OFFICE/OUTPT VISIT, EST, LEVL IV, 30-39 MIN: ICD-10-PCS | Mod: S$PBB,,, | Performed by: FAMILY MEDICINE

## 2020-06-30 PROCEDURE — 99214 OFFICE O/P EST MOD 30 MIN: CPT | Mod: S$PBB,,, | Performed by: FAMILY MEDICINE

## 2020-06-30 RX ORDER — ENALAPRIL MALEATE 5 MG/1
5 TABLET ORAL 2 TIMES DAILY
COMMUNITY
Start: 2020-06-11 | End: 2021-09-07 | Stop reason: SDUPTHER

## 2020-06-30 RX ORDER — TADALAFIL 5 MG/1
5 TABLET ORAL DAILY PRN
Qty: 30 TABLET | Refills: 5 | Status: SHIPPED | OUTPATIENT
Start: 2020-06-30 | End: 2020-07-08 | Stop reason: ALTCHOICE

## 2020-06-30 RX ORDER — ATENOLOL 25 MG/1
25 TABLET ORAL DAILY
COMMUNITY
End: 2021-01-04 | Stop reason: SDUPTHER

## 2020-06-30 NOTE — PROGRESS NOTES
Subjective:       Patient ID: Carlos Saravia is a 64 y.o. male.    Chief Complaint: Hyperlipidemia (6 mo f/u )      Patient is here for follow-up on cholesterol.  He did have a change in blood pressure meds, as his pressure was running low causing symptoms he went down to 25 mg atenolol and has base attack is 2.5 twice a day.  Patient is eating healthy year and is now on pravastatin 20 mg per day.  Had angiogram on May 27 no blockages.  Lab Results       Component                Value               Date                       WBC                      9.48                05/27/2020                 WBC                      9.48                05/27/2020                 HGB                      16.0                05/27/2020                 HGB                      16.0                05/27/2020                 HCT                      48.1                05/27/2020                 HCT                      48.1                05/27/2020                 PLT                      192                 05/27/2020                 PLT                      192                 05/27/2020                 CHOL                     149                 06/24/2020                 TRIG                     93                  06/24/2020                 HDL                      43                  06/24/2020                 ALT                      26                  06/24/2020                 AST                      24                  06/24/2020                 NA                       138                 06/24/2020                 K                        3.6                 06/24/2020                 CL                       106                 06/24/2020                 CREATININE               0.9                 06/24/2020                 BUN                      14                  06/24/2020                 CO2                      23                  06/24/2020                 TSH                      1.450                05/27/2020                 PSA                      1.8                 01/03/2020                 INR                      1.0                 05/25/2020                 HGBA1C                   5.5                 12/21/2017            Labs reviewed with the patient.  Patient has also had trouble with urine flow.  Recurring problems IA warty nevus on the left flank abdomen side which is very pruritic and has been for a number of years.        Hyperlipidemia  This is a chronic problem. The problem is uncontrolled (Started on pravastatin 1 month ago). Recent lipid tests were reviewed and are normal (on meds). Factors aggravating his hyperlipidemia include fatty foods. Current antihyperlipidemic treatment includes diet change, exercise and statins.   Erectile Dysfunction  This is a new problem. The problem has been gradually worsening since onset. Obstructive symptoms include dribbling, incomplete emptying and straining. Past treatments include nothing. He has been using treatment for 2 or more years. He has had dyspnea caused by medications. Risk factors include hypertension.       Allergies and Medications:   Review of patient's allergies indicates:   Allergen Reactions    No known drug allergies      Current Outpatient Medications   Medication Sig Dispense Refill    ascorbic acid, vitamin C, (VITAMIN C) 500 MG tablet Take 1 tablet (500 mg total) by mouth once daily.      aspirin 81 MG Chew Take 1 tablet (81 mg total) by mouth once daily.  0    atenoloL (TENORMIN) 25 MG tablet Take 25 mg by mouth once daily.      enalapril (VASOTEC) 5 MG tablet Take 2.5 mg by mouth 2 (two) times daily.       glucosamine &chondroit-mv-min3 125-121-29-0.5 mg Tab Take 1 tablet by mouth once daily.        PRAVACHOL 20 mg tablet       saw palmetto 80 MG capsule Take 1 tablet by mouth Daily. No Sig Provided      biotin 5,000 mcg TbDL Take by mouth once daily.      coenzyme Q10 10 mg capsule Take 10 mg by mouth once daily.       cranberry 500 mg Cap Take 1,500 mg by mouth 3 (three) times a week.      tadalafiL (CIALIS) 5 MG tablet Take 1 tablet (5 mg total) by mouth daily as needed for Erectile Dysfunction. 30 tablet 5     No current facility-administered medications for this visit.        Family History:   Family History   Problem Relation Age of Onset    Hypertension Mother     Diabetes Mother     No Known Problems Father        Social History:   Social History     Socioeconomic History    Marital status:      Spouse name: Not on file    Number of children: Not on file    Years of education: Not on file    Highest education level: Not on file   Occupational History    Not on file   Social Needs    Financial resource strain: Not hard at all    Food insecurity     Worry: Never true     Inability: Never true    Transportation needs     Medical: No     Non-medical: No   Tobacco Use    Smoking status: Never Smoker    Smokeless tobacco: Never Used   Substance and Sexual Activity    Alcohol use: No     Frequency: Never     Drinks per session: Patient refused     Binge frequency: Never    Drug use: No    Sexual activity: Not on file   Lifestyle    Physical activity     Days per week: 4 days     Minutes per session: 90 min    Stress: Not at all   Relationships    Social connections     Talks on phone: More than three times a week     Gets together: More than three times a week     Attends Faith service: Not on file     Active member of club or organization: No     Attends meetings of clubs or organizations: Never     Relationship status:    Other Topics Concern    Not on file   Social History Narrative    Not on file       Review of Systems   Genitourinary: Positive for incomplete emptying.       Objective:     Vitals:    06/30/20 0914   BP: 134/74   Pulse: (!) 58   Temp: 97.6 °F (36.4 °C)        Physical Exam  Abdominal:      Hernia: A hernia is present. There is no hernia in the left inguinal area or right  inguinal area.   Genitourinary:     Pubic Area: No rash or pubic lice.       Penis: Circumcised. No phimosis, paraphimosis, hypospadias, erythema, tenderness, discharge, swelling or lesions.       Scrotum/Testes: Normal. Cremasteric reflex is present.         Right: Mass, tenderness, swelling, testicular hydrocele or varicocele not present. Right testis is descended. Cremasteric reflex is present.          Left: Mass, tenderness, swelling, testicular hydrocele or varicocele not present. Left testis is descended. Cremasteric reflex is present.       Epididymis:      Right: Normal.      Maged stage (genital): 5.      Prostate: Enlarged. Not tender and no nodules present.      Rectum: Normal. Guaiac result negative. No mass, tenderness, anal fissure, external hemorrhoid or internal hemorrhoid. Normal anal tone.           Lymphadenopathy:      Lower Body: No left inguinal adenopathy.         Assessment:       1. Hyperlipidemia, unspecified hyperlipidemia type    2. Essential hypertension    3. ED (erectile dysfunction) of organic origin this is in combination with urine flow symptoms   4. Melanocytic nevus of skin this is chronic and needs further evaluation       Plan:       Carlos was seen today for hyperlipidemia.    Diagnoses and all orders for this visit:    Hyperlipidemia, unspecified hyperlipidemia type  -     Lipid Panel; Future  -     Comprehensive metabolic panel; Future    Essential hypertension    ED (erectile dysfunction) of organic origin  -     tadalafiL (CIALIS) 5 MG tablet; Take 1 tablet (5 mg total) by mouth daily as needed for Erectile Dysfunction.    Melanocytic nevus of skin  -     Ambulatory referral/consult to Dermatology; Future         Follow up in about 6 months (around 12/30/2020) for  , follow up cholesterol, follow up ED.

## 2020-07-06 ENCOUNTER — PATIENT MESSAGE (OUTPATIENT)
Dept: FAMILY MEDICINE | Facility: CLINIC | Age: 65
End: 2020-07-06

## 2020-07-06 DIAGNOSIS — N52.9 ED (ERECTILE DYSFUNCTION) OF ORGANIC ORIGIN: Primary | ICD-10-CM

## 2020-07-06 DIAGNOSIS — N40.0 PROSTATE ENLARGEMENT: ICD-10-CM

## 2020-07-29 ENCOUNTER — OFFICE VISIT (OUTPATIENT)
Dept: UROLOGY | Facility: CLINIC | Age: 65
End: 2020-07-29
Payer: MEDICARE

## 2020-07-29 VITALS
DIASTOLIC BLOOD PRESSURE: 78 MMHG | BODY MASS INDEX: 24.81 KG/M2 | HEART RATE: 50 BPM | WEIGHT: 177.25 LBS | RESPIRATION RATE: 18 BRPM | HEIGHT: 71 IN | SYSTOLIC BLOOD PRESSURE: 135 MMHG | TEMPERATURE: 97 F

## 2020-07-29 DIAGNOSIS — N40.1 BENIGN PROSTATIC HYPERPLASIA WITH INCOMPLETE BLADDER EMPTYING: Primary | ICD-10-CM

## 2020-07-29 DIAGNOSIS — N52.9 ERECTILE DYSFUNCTION, UNSPECIFIED ERECTILE DYSFUNCTION TYPE: ICD-10-CM

## 2020-07-29 DIAGNOSIS — R39.14 BENIGN PROSTATIC HYPERPLASIA WITH INCOMPLETE BLADDER EMPTYING: Primary | ICD-10-CM

## 2020-07-29 DIAGNOSIS — R33.9 INCOMPLETE EMPTYING OF BLADDER: ICD-10-CM

## 2020-07-29 DIAGNOSIS — N40.1 BENIGN LOCALIZED PROSTATIC HYPERPLASIA WITH LOWER URINARY TRACT SYMPTOMS (LUTS): ICD-10-CM

## 2020-07-29 LAB
BILIRUB SERPL-MCNC: NORMAL MG/DL
BLOOD URINE, POC: NORMAL
CLARITY, POC UA: CLEAR
COLOR, POC UA: YELLOW
GLUCOSE UR QL STRIP: NORMAL
KETONES UR QL STRIP: NORMAL
LEUKOCYTE ESTERASE URINE, POC: NORMAL
NITRITE, POC UA: NORMAL
PH, POC UA: 6
PROTEIN, POC: NORMAL
SPECIFIC GRAVITY, POC UA: 1.01
UROBILINOGEN, POC UA: 0.2

## 2020-07-29 PROCEDURE — 99213 OFFICE O/P EST LOW 20 MIN: CPT | Mod: PBBFAC,PN,25 | Performed by: UROLOGY

## 2020-07-29 PROCEDURE — 81002 URINALYSIS NONAUTO W/O SCOPE: CPT | Mod: PBBFAC,PN | Performed by: UROLOGY

## 2020-07-29 PROCEDURE — 81000 URINALYSIS NONAUTO W/SCOPE: CPT | Mod: PBBFAC,PN | Performed by: UROLOGY

## 2020-07-29 PROCEDURE — 51798 US URINE CAPACITY MEASURE: CPT | Mod: PBBFAC,PN | Performed by: UROLOGY

## 2020-07-29 PROCEDURE — 99999 PR PBB SHADOW E&M-EST. PATIENT-LVL III: ICD-10-PCS | Mod: PBBFAC,,, | Performed by: UROLOGY

## 2020-07-29 PROCEDURE — 99204 PR OFFICE/OUTPT VISIT, NEW, LEVL IV, 45-59 MIN: ICD-10-PCS | Mod: 25,S$PBB,, | Performed by: UROLOGY

## 2020-07-29 PROCEDURE — 99204 OFFICE O/P NEW MOD 45 MIN: CPT | Mod: 25,S$PBB,, | Performed by: UROLOGY

## 2020-07-29 PROCEDURE — 99999 PR PBB SHADOW E&M-EST. PATIENT-LVL III: CPT | Mod: PBBFAC,,, | Performed by: UROLOGY

## 2020-07-29 RX ORDER — TAMSULOSIN HYDROCHLORIDE 0.4 MG/1
0.4 CAPSULE ORAL DAILY
Qty: 30 CAPSULE | Refills: 11 | Status: SHIPPED | OUTPATIENT
Start: 2020-07-29 | End: 2021-08-17 | Stop reason: SDUPTHER

## 2020-07-29 NOTE — PROGRESS NOTES
"[unfilled]  [id number]  AGE:  64 y.o.     07/29/2020      DRUG ALLERGIES:  NKDA    CHIEF COMPLAINT:  Lower urinary tract symptoms    HISTORY OF PRESENT ILLNESS:  This 64-year-old male presents with a several year history of lower tract symptoms with difficulty emptying which has significantly worsened over the past several months.  He was placed on a trial of Cialis a month ago by his primary care physician Dr. Mendez which was not effective.  He was also placed on saw palmetto.  He also has a several month history of erectile dysfunction for which has received no other treatment other than the above-mentioned Cialis.  He also has a history renal calculi  in the past but has not had no stone episodes since 2012.    MEDICAL HISTORY:  Hypertension, high cholesterol, gout.  He also was experiencing some chest pain 2 months ago for which he underwent a workup by his cardiologist Dr. Olvera and workup proved to be negative.     PAST SURGICAL HISTORY:  Tonsillectomy and adenoidectomy, appendectomy, colon resection for polyps.    PRESENT MEDICATIONS:  Aspirin, tenormin, vasotec, Pravachol, saw palmetto    FAMILY HISTORY:  Diabetes and heart disease    SOCIAL HISTORY:  He is a [employer] .   with 2 healthy daughters.  Tobacco none.  Alcohol none.     REVIEW OF SYMPTOMS:  GENERAL:  No weight change.  Good appetite.  HEAD AND NECK:  No headaches.  Wears contact lenses.  CARDIORESPIRATORY:  No chest pain or shortness of breath no cough   GASTROINTESTINAL:  No trouble swallowing no constipation or diarrhea.  MUSCULOSKELETAL:  No joint or back problems.    PHYSICAL EXAMINATION:  VITAL SIGNS:  /78   Pulse (!) 50   Temp 97.1 °F (36.2 °C) (Oral)   Resp 18   Ht 5' 11" (1.803 m)   Wt 80.4 kg (177 lb 4 oz)   BMI 24.72 kg/m²   GENERAL:  Well-developed, well-nourished 64 y.o.  -year-old male, alert, oriented, cooperative, in no acute distress.  HEAD AND NECK:  Throat clear.  No adenopathy.  CHEST:  " Clear.  HEART:  Regular.  No murmur.  ABDOMEN:  Soft, benign and nontender.  No masses.  No hernias.  No organomegaly.  Some questionable fullness in the suprapubic area  EXTERNAL GENITAL:  Normal phallus with adequate meatus.  Testes descended and   feel normal.  No scrotal masses.  RECTAL:  A 25 g smooth prostate.  No nodules.  Normal sphincter tone.    Bladder scan - 150 mL postvoid residual     UA- negative    PSA  - 1.8 on 01/03/2020    FINAL IMPRESSION:  BPH with lower urinary tract symptoms, incomplete bladder emptying, erectile dysfunction.    RECOMMENDATIONS:  Trial on Flomax 0.4 mg p.o. q.d. .  Recheck 6 weeks and with subsequently further address his other issues including erectile dysfunction.

## 2020-09-25 ENCOUNTER — OFFICE VISIT (OUTPATIENT)
Dept: UROLOGY | Facility: CLINIC | Age: 65
End: 2020-09-25
Payer: MEDICARE

## 2020-09-25 VITALS
BODY MASS INDEX: 24.81 KG/M2 | DIASTOLIC BLOOD PRESSURE: 75 MMHG | WEIGHT: 177.25 LBS | SYSTOLIC BLOOD PRESSURE: 123 MMHG | HEIGHT: 71 IN | RESPIRATION RATE: 18 BRPM | HEART RATE: 45 BPM | TEMPERATURE: 98 F

## 2020-09-25 DIAGNOSIS — R39.14 BENIGN PROSTATIC HYPERPLASIA WITH INCOMPLETE BLADDER EMPTYING: Primary | ICD-10-CM

## 2020-09-25 DIAGNOSIS — N40.1 BENIGN PROSTATIC HYPERPLASIA WITH INCOMPLETE BLADDER EMPTYING: Primary | ICD-10-CM

## 2020-09-25 DIAGNOSIS — N52.9 ERECTILE DYSFUNCTION, UNSPECIFIED ERECTILE DYSFUNCTION TYPE: ICD-10-CM

## 2020-09-25 LAB
BILIRUB SERPL-MCNC: NORMAL MG/DL
BLOOD URINE, POC: NORMAL
CLARITY, POC UA: CLEAR
COLOR, POC UA: YELLOW
GLUCOSE UR QL STRIP: NORMAL
KETONES UR QL STRIP: NORMAL
LEUKOCYTE ESTERASE URINE, POC: NORMAL
NITRITE, POC UA: NORMAL
PH, POC UA: 6
PROTEIN, POC: NORMAL
SPECIFIC GRAVITY, POC UA: 1
UROBILINOGEN, POC UA: 0.2

## 2020-09-25 PROCEDURE — 51798 US URINE CAPACITY MEASURE: CPT | Mod: PBBFAC,PN | Performed by: UROLOGY

## 2020-09-25 PROCEDURE — 99999 PR PBB SHADOW E&M-EST. PATIENT-LVL IV: ICD-10-PCS | Mod: PBBFAC,,, | Performed by: UROLOGY

## 2020-09-25 PROCEDURE — 99214 OFFICE O/P EST MOD 30 MIN: CPT | Mod: 25,S$PBB,, | Performed by: UROLOGY

## 2020-09-25 PROCEDURE — 81000 URINALYSIS NONAUTO W/SCOPE: CPT | Mod: PBBFAC,PN | Performed by: UROLOGY

## 2020-09-25 PROCEDURE — 99999 PR PBB SHADOW E&M-EST. PATIENT-LVL IV: CPT | Mod: PBBFAC,,, | Performed by: UROLOGY

## 2020-09-25 PROCEDURE — 81002 URINALYSIS NONAUTO W/O SCOPE: CPT | Mod: PBBFAC,PN | Performed by: UROLOGY

## 2020-09-25 PROCEDURE — 99214 PR OFFICE/OUTPT VISIT, EST, LEVL IV, 30-39 MIN: ICD-10-PCS | Mod: 25,S$PBB,, | Performed by: UROLOGY

## 2020-09-25 PROCEDURE — 99214 OFFICE O/P EST MOD 30 MIN: CPT | Mod: PBBFAC,PN,25 | Performed by: UROLOGY

## 2020-09-25 RX ORDER — SILDENAFIL 100 MG/1
100 TABLET, FILM COATED ORAL DAILY PRN
Qty: 6 TABLET | Refills: 6 | Status: SHIPPED | OUTPATIENT
Start: 2020-09-25 | End: 2020-11-13 | Stop reason: ALTCHOICE

## 2020-09-25 RX ORDER — ALFUZOSIN HYDROCHLORIDE 10 MG/1
10 TABLET, EXTENDED RELEASE ORAL
Qty: 30 TABLET | Refills: 11 | Status: SHIPPED | OUTPATIENT
Start: 2020-09-25 | End: 2020-11-13 | Stop reason: SINTOL

## 2020-09-25 NOTE — PROGRESS NOTES
OFFICE NOTE  [unfilled]  [id number]  9/25/2020       CHIEF COMPLAINT:   BPH lower urinary tract symptoms, erectile dysfunction     HISTORY OF PRESENT ILLNESS:   this 65-year-old male returns routine recheck.  At his last visit on 07/29/2020 was placed on a trial of Flomax for management of his lower tract urinary symptoms and incomplete bladder emptying but he states on today's visit he did not notice any improvement in his symptoms.  He also has been experiencing problems with erectile dysfunction which would like to be treated.      Bladder scan - 230 cc     PSA  - 1.8 on 01/03/2020     UA - negative pH 6.0     FINAL IMPRESSION:   BPH lower urine tract symptoms, erectile dysfunction     RECOMMENDATIONS:  Renal ultrasound for baseline  imaging evaluation.  Trial on Uroxatral in place of the Flomax.  Trial of Viagra 100 mg.  Further  workup such as cystoscopy may need to be considered if no further improvement.

## 2020-09-28 ENCOUNTER — HOSPITAL ENCOUNTER (OUTPATIENT)
Dept: RADIOLOGY | Facility: HOSPITAL | Age: 65
Discharge: HOME | End: 2020-09-28
Attending: UROLOGY
Payer: MEDICARE

## 2020-09-28 DIAGNOSIS — N40.1 BENIGN PROSTATIC HYPERPLASIA WITH INCOMPLETE BLADDER EMPTYING: ICD-10-CM

## 2020-09-28 DIAGNOSIS — R39.14 BENIGN PROSTATIC HYPERPLASIA WITH INCOMPLETE BLADDER EMPTYING: ICD-10-CM

## 2020-09-28 PROCEDURE — 76770 US EXAM ABDO BACK WALL COMP: CPT | Mod: TC

## 2020-11-13 ENCOUNTER — OFFICE VISIT (OUTPATIENT)
Dept: UROLOGY | Facility: CLINIC | Age: 65
End: 2020-11-13
Payer: MEDICARE

## 2020-11-13 ENCOUNTER — PATIENT MESSAGE (OUTPATIENT)
Dept: FAMILY MEDICINE | Facility: CLINIC | Age: 65
End: 2020-11-13

## 2020-11-13 ENCOUNTER — LAB VISIT (OUTPATIENT)
Dept: LAB | Facility: HOSPITAL | Age: 65
End: 2020-11-13
Attending: UROLOGY
Payer: MEDICARE

## 2020-11-13 VITALS
RESPIRATION RATE: 18 BRPM | DIASTOLIC BLOOD PRESSURE: 88 MMHG | HEIGHT: 71 IN | TEMPERATURE: 99 F | WEIGHT: 177.25 LBS | HEART RATE: 81 BPM | BODY MASS INDEX: 24.81 KG/M2 | SYSTOLIC BLOOD PRESSURE: 135 MMHG

## 2020-11-13 DIAGNOSIS — R31.29 MICROSCOPIC HEMATURIA: Primary | ICD-10-CM

## 2020-11-13 DIAGNOSIS — R10.32 ABDOMINAL PAIN, LEFT LOWER QUADRANT: ICD-10-CM

## 2020-11-13 DIAGNOSIS — R31.9 HEMATURIA, UNSPECIFIED TYPE: ICD-10-CM

## 2020-11-13 DIAGNOSIS — R31.29 MICROSCOPIC HEMATURIA: ICD-10-CM

## 2020-11-13 LAB
CREAT SERPL-MCNC: 1 MG/DL (ref 0.5–1.4)
EST. GFR  (AFRICAN AMERICAN): >60 ML/MIN/1.73 M^2
EST. GFR  (NON AFRICAN AMERICAN): >60 ML/MIN/1.73 M^2

## 2020-11-13 PROCEDURE — 99214 OFFICE O/P EST MOD 30 MIN: CPT | Mod: PBBFAC,PN | Performed by: UROLOGY

## 2020-11-13 PROCEDURE — 88112 PR  CYTOPATH, CELL ENHANCE TECH: ICD-10-PCS | Mod: 26,,, | Performed by: PATHOLOGY

## 2020-11-13 PROCEDURE — 99999 PR PBB SHADOW E&M-EST. PATIENT-LVL IV: CPT | Mod: PBBFAC,,, | Performed by: UROLOGY

## 2020-11-13 PROCEDURE — 88112 CYTOPATH CELL ENHANCE TECH: CPT | Mod: 26,,, | Performed by: PATHOLOGY

## 2020-11-13 PROCEDURE — 88112 CYTOPATH CELL ENHANCE TECH: CPT | Performed by: PATHOLOGY

## 2020-11-13 PROCEDURE — 82565 ASSAY OF CREATININE: CPT

## 2020-11-13 PROCEDURE — 99214 OFFICE O/P EST MOD 30 MIN: CPT | Mod: 25,S$PBB,, | Performed by: UROLOGY

## 2020-11-13 PROCEDURE — 81000 URINALYSIS NONAUTO W/SCOPE: CPT | Mod: PBBFAC,PN | Performed by: UROLOGY

## 2020-11-13 PROCEDURE — 99214 PR OFFICE/OUTPT VISIT, EST, LEVL IV, 30-39 MIN: ICD-10-PCS | Mod: 25,S$PBB,, | Performed by: UROLOGY

## 2020-11-13 PROCEDURE — 51798 US URINE CAPACITY MEASURE: CPT | Mod: PBBFAC,PN | Performed by: UROLOGY

## 2020-11-13 PROCEDURE — 87086 URINE CULTURE/COLONY COUNT: CPT

## 2020-11-13 PROCEDURE — 36415 COLL VENOUS BLD VENIPUNCTURE: CPT

## 2020-11-13 PROCEDURE — 99999 PR PBB SHADOW E&M-EST. PATIENT-LVL IV: ICD-10-PCS | Mod: PBBFAC,,, | Performed by: UROLOGY

## 2020-11-13 RX ORDER — TRIAMCINOLONE ACETONIDE 1 MG/G
CREAM TOPICAL
COMMUNITY
Start: 2020-09-01 | End: 2020-12-30

## 2020-11-13 NOTE — PROGRESS NOTES
OFFICE NOTE  [unfilled]  [id number]  11/13/2020       CHIEF COMPLAINT:   BPH lower urinary tract symptoms, history of erectile dysfunction     HISTORY OF PRESENT ILLNESS:   this 65-year-old male returns routine recheck.  He has history BPH lower urine tract symptoms which is currently on Flomax with which he is doing much better.  It must be noted that at the last visit he had mention that the Flomax was not as effective but it appears to be improved and is happy with it.  He had tried Uroxatral but this caused some heart palpitations so he stopped taking it and resumed the Flomax.  In terms of his erectile dysfunction he did try Viagra but this also caused heart palpitations for which he discontinued it.  Patient also has been experiencing some abdominal pain mostly in the left lower quadrant he does have a history of diverticulosis.    Bladder scan - 0 cc     PSA  - 1.8 on 01/03/2020     UA - trace of blood pH 5.5     FINAL IMPRESSION:  Microscopic hematuria, abdominal pain mostly in the left lower quadrant, BPH       RECOMMENDATIONS:  Urine for culture and cytology.  CT scan abdomen pelvis.  Continue on Flomax.  Possibility of cystoscopy may need to be considered pending the findings and response to the treatment .

## 2020-11-14 LAB — BACTERIA UR CULT: NO GROWTH

## 2020-11-17 ENCOUNTER — TELEPHONE (OUTPATIENT)
Dept: UROLOGY | Facility: CLINIC | Age: 65
End: 2020-11-17

## 2020-11-17 ENCOUNTER — HOSPITAL ENCOUNTER (OUTPATIENT)
Dept: RADIOLOGY | Facility: HOSPITAL | Age: 65
Discharge: HOME | End: 2020-11-17
Attending: UROLOGY
Payer: MEDICARE

## 2020-11-17 DIAGNOSIS — R31.29 MICROSCOPIC HEMATURIA: ICD-10-CM

## 2020-11-17 DIAGNOSIS — R31.9 HEMATURIA, UNSPECIFIED TYPE: ICD-10-CM

## 2020-11-17 DIAGNOSIS — R10.32 ABDOMINAL PAIN, LEFT LOWER QUADRANT: ICD-10-CM

## 2020-11-17 LAB — FINAL PATHOLOGIC DIAGNOSIS: NORMAL

## 2020-11-17 PROCEDURE — 25500020 PHARM REV CODE 255

## 2020-11-17 PROCEDURE — 74178 CT ABD&PLV WO CNTR FLWD CNTR: CPT | Mod: TC

## 2020-11-17 PROCEDURE — 74178 CT ABD&PLV WO CNTR FLWD CNTR: CPT | Mod: 26,,, | Performed by: RADIOLOGY

## 2020-11-17 PROCEDURE — 74178 CT ABDOMEN PELVIS W WO CONTRAST: ICD-10-PCS | Mod: 26,,, | Performed by: RADIOLOGY

## 2020-11-17 RX ADMIN — IOHEXOL 125 ML: 350 INJECTION, SOLUTION INTRAVENOUS at 09:11

## 2020-11-17 NOTE — TELEPHONE ENCOUNTER
MD spoke with patient about abnormal CT and importance of seeing general surgeon. Consult given to Dr Parker.  Patient states he had procedure done with Dr Diaz. Md advised, can chose which MD, but really needs to be seen asap.

## 2020-11-17 NOTE — TELEPHONE ENCOUNTER
----- Message from Keerthi Verduzco LPN sent at 11/17/2020  2:23 PM CST -----  Can someone call this pt and inform him why he needs to see gen surg please   ----- Message -----  From: Clarisse Schuster LPN  Sent: 11/17/2020   1:40 PM CST  To: Elena SALES Jr. Staff    [facility], Dr Alvarez spoke with Dr Villasenor( radiologist)about patient and patient has perforated colon and needs to be seen by surgeon as soon as possible. Can you contact for an appt.      Thank you, clarisse

## 2020-11-17 NOTE — TELEPHONE ENCOUNTER
----- Message from Sheryl Ochoa sent at 11/17/2020 10:45 AM CST -----  Contact: Dr. Cesar Donaldson  Call placed to pod.  Dr. Cesar Villasenor just called he is asking for a call back to discuss the care of this patient.  Please call back [phone]

## 2020-11-17 NOTE — TELEPHONE ENCOUNTER
MD spoke with Dr Villasenor, radiologist, patient has perforated colon and needs to be seen by general surgeon as soon as possible. Message sent to surgeon office high priority for appt.

## 2020-11-18 ENCOUNTER — HOSPITAL ENCOUNTER (INPATIENT)
Facility: HOSPITAL | Age: 65
LOS: 1 days | Discharge: HOME | DRG: 392 | End: 2020-11-19
Attending: EMERGENCY MEDICINE | Admitting: INTERNAL MEDICINE
Payer: MEDICARE

## 2020-11-18 ENCOUNTER — TELEPHONE (OUTPATIENT)
Dept: UROLOGY | Facility: CLINIC | Age: 65
End: 2020-11-18

## 2020-11-18 ENCOUNTER — DOCUMENTATION ONLY (OUTPATIENT)
Dept: FAMILY MEDICINE | Facility: CLINIC | Age: 65
End: 2020-11-18

## 2020-11-18 DIAGNOSIS — K63.1 SMALL BOWEL PERFORATION: ICD-10-CM

## 2020-11-18 DIAGNOSIS — K63.1 BOWEL PERFORATION: Primary | ICD-10-CM

## 2020-11-18 DIAGNOSIS — L02.91 ABSCESS: ICD-10-CM

## 2020-11-18 PROBLEM — Z91.89 CARDIOVASCULAR EVENT RISK: Status: RESOLVED | Noted: 2019-06-24 | Resolved: 2020-11-18

## 2020-11-18 PROBLEM — Z28.21 IMMUNIZATION REFUSED: Status: RESOLVED | Noted: 2019-06-24 | Resolved: 2020-11-18

## 2020-11-18 PROBLEM — Z23 IMMUNIZATION DUE: Status: RESOLVED | Noted: 2019-12-30 | Resolved: 2020-11-18

## 2020-11-18 LAB
ALBUMIN SERPL BCP-MCNC: 3.5 G/DL (ref 3.5–5.2)
ALP SERPL-CCNC: 122 U/L (ref 55–135)
ALT SERPL W/O P-5'-P-CCNC: 41 U/L (ref 10–44)
ANION GAP SERPL CALC-SCNC: 11 MMOL/L (ref 8–16)
AST SERPL-CCNC: 38 U/L (ref 10–40)
BASOPHILS # BLD AUTO: 0.04 K/UL (ref 0–0.2)
BASOPHILS NFR BLD: 0.4 % (ref 0–1.9)
BILIRUB SERPL-MCNC: 0.8 MG/DL (ref 0.1–1)
BILIRUB UR QL STRIP: NEGATIVE
BUN SERPL-MCNC: 9 MG/DL (ref 8–23)
CALCIUM SERPL-MCNC: 9.6 MG/DL (ref 8.7–10.5)
CHLORIDE SERPL-SCNC: 97 MMOL/L (ref 95–110)
CLARITY UR: CLEAR
CO2 SERPL-SCNC: 26 MMOL/L (ref 23–29)
COLOR UR: YELLOW
CREAT SERPL-MCNC: 1 MG/DL (ref 0.5–1.4)
DIFFERENTIAL METHOD: ABNORMAL
EOSINOPHIL # BLD AUTO: 0 K/UL (ref 0–0.5)
EOSINOPHIL NFR BLD: 0.4 % (ref 0–8)
ERYTHROCYTE [DISTWIDTH] IN BLOOD BY AUTOMATED COUNT: 12.9 % (ref 11.5–14.5)
EST. GFR  (AFRICAN AMERICAN): >60 ML/MIN/1.73 M^2
EST. GFR  (NON AFRICAN AMERICAN): >60 ML/MIN/1.73 M^2
GLUCOSE SERPL-MCNC: 93 MG/DL (ref 70–110)
GLUCOSE UR QL STRIP: NEGATIVE
HCT VFR BLD AUTO: 44.7 % (ref 40–54)
HGB BLD-MCNC: 14.2 G/DL (ref 14–18)
HGB UR QL STRIP: NEGATIVE
IMM GRANULOCYTES # BLD AUTO: 0.04 K/UL (ref 0–0.04)
IMM GRANULOCYTES NFR BLD AUTO: 0.4 % (ref 0–0.5)
INR PPP: 1.1
KETONES UR QL STRIP: NEGATIVE
LEUKOCYTE ESTERASE UR QL STRIP: NEGATIVE
LYMPHOCYTES # BLD AUTO: 1.3 K/UL (ref 1–4.8)
LYMPHOCYTES NFR BLD: 11.7 % (ref 18–48)
MCH RBC QN AUTO: 29.1 PG (ref 27–31)
MCHC RBC AUTO-ENTMCNC: 31.8 G/DL (ref 32–36)
MCV RBC AUTO: 92 FL (ref 82–98)
MONOCYTES # BLD AUTO: 0.9 K/UL (ref 0.3–1)
MONOCYTES NFR BLD: 8.3 % (ref 4–15)
NEUTROPHILS # BLD AUTO: 8.7 K/UL (ref 1.8–7.7)
NEUTROPHILS NFR BLD: 78.8 % (ref 38–73)
NITRITE UR QL STRIP: NEGATIVE
NRBC BLD-RTO: 0 /100 WBC
PH UR STRIP: 7 [PH] (ref 5–8)
PLATELET # BLD AUTO: 284 K/UL (ref 150–350)
PMV BLD AUTO: 10 FL (ref 9.2–12.9)
POTASSIUM SERPL-SCNC: 4 MMOL/L (ref 3.5–5.1)
PROT SERPL-MCNC: 7.6 G/DL (ref 6–8.4)
PROT UR QL STRIP: NEGATIVE
PROTHROMBIN TIME: 13.6 SEC (ref 10.6–14.8)
RBC # BLD AUTO: 4.88 M/UL (ref 4.6–6.2)
SARS-COV-2 RDRP RESP QL NAA+PROBE: NEGATIVE
SODIUM SERPL-SCNC: 134 MMOL/L (ref 136–145)
SP GR UR STRIP: 1 (ref 1–1.03)
URN SPEC COLLECT METH UR: NORMAL
UROBILINOGEN UR STRIP-ACNC: NEGATIVE EU/DL
WBC # BLD AUTO: 11.06 K/UL (ref 3.9–12.7)

## 2020-11-18 PROCEDURE — 80053 COMPREHEN METABOLIC PANEL: CPT

## 2020-11-18 PROCEDURE — 63600175 PHARM REV CODE 636 W HCPCS: Performed by: EMERGENCY MEDICINE

## 2020-11-18 PROCEDURE — 85610 PROTHROMBIN TIME: CPT

## 2020-11-18 PROCEDURE — 93010 EKG 12-LEAD: ICD-10-PCS | Mod: ,,, | Performed by: INTERNAL MEDICINE

## 2020-11-18 PROCEDURE — 99223 PR INITIAL HOSPITAL CARE,LEVL III: ICD-10-PCS | Mod: ,,, | Performed by: STUDENT IN AN ORGANIZED HEALTH CARE EDUCATION/TRAINING PROGRAM

## 2020-11-18 PROCEDURE — 85025 COMPLETE CBC W/AUTO DIFF WBC: CPT

## 2020-11-18 PROCEDURE — 93010 ELECTROCARDIOGRAM REPORT: CPT | Mod: ,,, | Performed by: INTERNAL MEDICINE

## 2020-11-18 PROCEDURE — 81003 URINALYSIS AUTO W/O SCOPE: CPT

## 2020-11-18 PROCEDURE — 63600175 PHARM REV CODE 636 W HCPCS: Performed by: NURSE PRACTITIONER

## 2020-11-18 PROCEDURE — S0030 INJECTION, METRONIDAZOLE: HCPCS | Performed by: EMERGENCY MEDICINE

## 2020-11-18 PROCEDURE — 12000002 HC ACUTE/MED SURGE SEMI-PRIVATE ROOM

## 2020-11-18 PROCEDURE — 99223 1ST HOSP IP/OBS HIGH 75: CPT | Mod: ,,, | Performed by: STUDENT IN AN ORGANIZED HEALTH CARE EDUCATION/TRAINING PROGRAM

## 2020-11-18 PROCEDURE — 25000003 PHARM REV CODE 250: Performed by: EMERGENCY MEDICINE

## 2020-11-18 PROCEDURE — U0002 COVID-19 LAB TEST NON-CDC: HCPCS

## 2020-11-18 PROCEDURE — S0030 INJECTION, METRONIDAZOLE: HCPCS | Performed by: NURSE PRACTITIONER

## 2020-11-18 PROCEDURE — 99285 EMERGENCY DEPT VISIT HI MDM: CPT | Mod: 25

## 2020-11-18 PROCEDURE — 93005 ELECTROCARDIOGRAM TRACING: CPT | Performed by: INTERNAL MEDICINE

## 2020-11-18 PROCEDURE — 25000003 PHARM REV CODE 250: Performed by: NURSE PRACTITIONER

## 2020-11-18 PROCEDURE — 96374 THER/PROPH/DIAG INJ IV PUSH: CPT

## 2020-11-18 PROCEDURE — 96375 TX/PRO/DX INJ NEW DRUG ADDON: CPT

## 2020-11-18 RX ORDER — POTASSIUM CHLORIDE 7.45 MG/ML
40 INJECTION INTRAVENOUS
Status: DISCONTINUED | OUTPATIENT
Start: 2020-11-18 | End: 2020-11-19 | Stop reason: HOSPADM

## 2020-11-18 RX ORDER — LABETALOL HYDROCHLORIDE 5 MG/ML
10 INJECTION, SOLUTION INTRAVENOUS EVERY 4 HOURS PRN
Status: DISCONTINUED | OUTPATIENT
Start: 2020-11-18 | End: 2020-11-19 | Stop reason: HOSPADM

## 2020-11-18 RX ORDER — CALCIUM CHLORIDE IN 0.9 % NACL 1 G/100 ML
1 INTRAVENOUS SOLUTION, PIGGYBACK (ML) INTRAVENOUS
Status: DISCONTINUED | OUTPATIENT
Start: 2020-11-18 | End: 2020-11-19 | Stop reason: HOSPADM

## 2020-11-18 RX ORDER — POTASSIUM CHLORIDE 20 MEQ/1
40 TABLET, EXTENDED RELEASE ORAL
Status: DISCONTINUED | OUTPATIENT
Start: 2020-11-18 | End: 2020-11-19 | Stop reason: HOSPADM

## 2020-11-18 RX ORDER — METRONIDAZOLE 500 MG/100ML
500 INJECTION, SOLUTION INTRAVENOUS
Status: COMPLETED | OUTPATIENT
Start: 2020-11-18 | End: 2020-11-18

## 2020-11-18 RX ORDER — MAGNESIUM SULFATE HEPTAHYDRATE 40 MG/ML
2 INJECTION, SOLUTION INTRAVENOUS
Status: DISCONTINUED | OUTPATIENT
Start: 2020-11-18 | End: 2020-11-19 | Stop reason: HOSPADM

## 2020-11-18 RX ORDER — LEVOFLOXACIN 5 MG/ML
500 INJECTION, SOLUTION INTRAVENOUS
Status: COMPLETED | OUTPATIENT
Start: 2020-11-18 | End: 2020-11-18

## 2020-11-18 RX ORDER — LEVOFLOXACIN 5 MG/ML
500 INJECTION, SOLUTION INTRAVENOUS
Status: DISCONTINUED | OUTPATIENT
Start: 2020-11-19 | End: 2020-11-19 | Stop reason: HOSPADM

## 2020-11-18 RX ORDER — ENALAPRIL MALEATE 2.5 MG/1
2.5 TABLET ORAL 2 TIMES DAILY
Status: DISCONTINUED | OUTPATIENT
Start: 2020-11-18 | End: 2020-11-19 | Stop reason: HOSPADM

## 2020-11-18 RX ORDER — ATENOLOL 25 MG/1
25 TABLET ORAL DAILY
Status: DISCONTINUED | OUTPATIENT
Start: 2020-11-18 | End: 2020-11-19 | Stop reason: HOSPADM

## 2020-11-18 RX ORDER — ONDANSETRON 2 MG/ML
4 INJECTION INTRAMUSCULAR; INTRAVENOUS EVERY 8 HOURS PRN
Status: DISCONTINUED | OUTPATIENT
Start: 2020-11-18 | End: 2020-11-19 | Stop reason: HOSPADM

## 2020-11-18 RX ORDER — POTASSIUM CHLORIDE 7.45 MG/ML
20 INJECTION INTRAVENOUS
Status: DISCONTINUED | OUTPATIENT
Start: 2020-11-18 | End: 2020-11-19 | Stop reason: HOSPADM

## 2020-11-18 RX ORDER — MORPHINE SULFATE 2 MG/ML
2 INJECTION, SOLUTION INTRAMUSCULAR; INTRAVENOUS EVERY 4 HOURS PRN
Status: DISCONTINUED | OUTPATIENT
Start: 2020-11-18 | End: 2020-11-19 | Stop reason: HOSPADM

## 2020-11-18 RX ORDER — PRAVASTATIN SODIUM 20 MG/1
20 TABLET ORAL NIGHTLY
Status: DISCONTINUED | OUTPATIENT
Start: 2020-11-18 | End: 2020-11-19 | Stop reason: HOSPADM

## 2020-11-18 RX ORDER — TAMSULOSIN HYDROCHLORIDE 0.4 MG/1
0.4 CAPSULE ORAL DAILY
Status: DISCONTINUED | OUTPATIENT
Start: 2020-11-18 | End: 2020-11-19 | Stop reason: HOSPADM

## 2020-11-18 RX ORDER — POTASSIUM CHLORIDE 20 MEQ/1
20 TABLET, EXTENDED RELEASE ORAL
Status: DISCONTINUED | OUTPATIENT
Start: 2020-11-18 | End: 2020-11-19 | Stop reason: HOSPADM

## 2020-11-18 RX ORDER — METRONIDAZOLE 500 MG/100ML
500 INJECTION, SOLUTION INTRAVENOUS
Status: DISCONTINUED | OUTPATIENT
Start: 2020-11-18 | End: 2020-11-19 | Stop reason: HOSPADM

## 2020-11-18 RX ORDER — LANOLIN ALCOHOL/MO/W.PET/CERES
800 CREAM (GRAM) TOPICAL
Status: DISCONTINUED | OUTPATIENT
Start: 2020-11-18 | End: 2020-11-19 | Stop reason: HOSPADM

## 2020-11-18 RX ORDER — MAGNESIUM SULFATE HEPTAHYDRATE 40 MG/ML
4 INJECTION, SOLUTION INTRAVENOUS
Status: DISCONTINUED | OUTPATIENT
Start: 2020-11-18 | End: 2020-11-19 | Stop reason: HOSPADM

## 2020-11-18 RX ORDER — MORPHINE SULFATE 4 MG/ML
4 INJECTION, SOLUTION INTRAMUSCULAR; INTRAVENOUS EVERY 4 HOURS PRN
Status: DISCONTINUED | OUTPATIENT
Start: 2020-11-18 | End: 2020-11-19 | Stop reason: HOSPADM

## 2020-11-18 RX ORDER — SODIUM CHLORIDE 0.9 % (FLUSH) 0.9 %
10 SYRINGE (ML) INJECTION
Status: DISCONTINUED | OUTPATIENT
Start: 2020-11-18 | End: 2020-11-19 | Stop reason: HOSPADM

## 2020-11-18 RX ORDER — SODIUM CHLORIDE, SODIUM LACTATE, POTASSIUM CHLORIDE, CALCIUM CHLORIDE 600; 310; 30; 20 MG/100ML; MG/100ML; MG/100ML; MG/100ML
INJECTION, SOLUTION INTRAVENOUS CONTINUOUS
Status: DISCONTINUED | OUTPATIENT
Start: 2020-11-18 | End: 2020-11-19

## 2020-11-18 RX ORDER — MAGNESIUM SULFATE 1 G/100ML
1 INJECTION INTRAVENOUS
Status: DISCONTINUED | OUTPATIENT
Start: 2020-11-18 | End: 2020-11-19 | Stop reason: HOSPADM

## 2020-11-18 RX ADMIN — PRAVASTATIN SODIUM 20 MG: 20 TABLET ORAL at 08:11

## 2020-11-18 RX ADMIN — METRONIDAZOLE 500 MG: 500 INJECTION, SOLUTION INTRAVENOUS at 02:11

## 2020-11-18 RX ADMIN — METRONIDAZOLE 500 MG: 500 INJECTION, SOLUTION INTRAVENOUS at 09:11

## 2020-11-18 RX ADMIN — LEVOFLOXACIN 500 MG: 500 INJECTION, SOLUTION INTRAVENOUS at 12:11

## 2020-11-18 RX ADMIN — SODIUM CHLORIDE, SODIUM LACTATE, POTASSIUM CHLORIDE, AND CALCIUM CHLORIDE: .6; .31; .03; .02 INJECTION, SOLUTION INTRAVENOUS at 04:11

## 2020-11-18 NOTE — ED PROVIDER NOTES
Encounter Date: 11/18/2020       History     Chief Complaint   Patient presents with    Abdominal Pain     CT shows bowel perf/incaps     65-year-old male has a history of kidney stones and microscopic hematuria, hypertension, BPH who presents to the ED at the request of Dr. Arlene Sanz's office..  The patient began 1 week ago with left lower quadrant abdominal pain and had a CT scan of his abdomen and pelvis with contrast 2 days ago.  He was notified today of abnormal findings which was shown to have had inflamed diameter small-bowel with some marked mesenteric inflammation and apparent 4 cm contained perforation containing fecal material.  At this time the patient is not had any fever he does not complain of much abdominal pain.  He denies any nausea vomiting and has had normal bowel movements.  Denies flank pain.  States that the mild discomfort that he has experienced hand is unrelated to any movement.  He was aware of a history of diverticulosis but never diverticulitis.  The patient denies any hematuria, dysuria or incontinence.  No complaints of any coughing or shortness of breath.  No chest pain.  No fever.        Review of patient's allergies indicates:   Allergen Reactions    Uroxatral [alfuzosin]      Past Medical History:   Diagnosis Date    Gout     Hypertension     Kidney stone      Past Surgical History:   Procedure Laterality Date    adnoidectomy      ANGIOGRAM, CORONARY, WITH LEFT HEART CATHETERIZATION Left 5/27/2020    Procedure: Left heart cath;  Surgeon: Nick Kerr MD;  [facility]: [facility] CATH[facility]EP LAB;  Service: Cardiology;  Laterality: Left;    APPENDECTOMY      COLON SURGERY      colon rescection benign polyp    tonsillectomy       Family History   Problem Relation Age of Onset    Hypertension Mother     Diabetes Mother     No Known Problems Father      Social History     Tobacco Use    Smoking status: Never Smoker    Smokeless tobacco: Never Used   Substance Use Topics     Alcohol use: No     Frequency: Never     Drinks per session: Patient refused     Binge frequency: Never    Drug use: No     Review of Systems   Constitutional: Negative for activity change, appetite change, chills and fever.   HENT: Negative for congestion, ear pain, rhinorrhea, sinus pain, sore throat and trouble swallowing.    Respiratory: Negative for cough and shortness of breath.    Cardiovascular: Negative for chest pain.   Gastrointestinal: Negative for abdominal pain, constipation, diarrhea, nausea and vomiting.   Genitourinary: Negative for flank pain, frequency and hematuria.   Musculoskeletal: Negative for back pain and myalgias.   Skin: Negative for pallor, rash and wound.   Neurological: Negative for weakness and headaches.   All other systems reviewed and are negative.      Physical Exam     Initial Vitals [11/18/20 1123]   BP Pulse Resp Temp SpO2   130/82 61 16 98.5 °F (36.9 °C) 97 %      MAP       --         Physical Exam    Vitals reviewed.  Constitutional: He appears well-developed and well-nourished. He is not diaphoretic. No distress.   HENT:   Head: Normocephalic and atraumatic.   Right Ear: External ear normal.   Left Ear: External ear normal.   Nose: Nose normal.   Mouth/Throat: Oropharynx is clear and moist.   Eyes: Conjunctivae and EOM are normal. Pupils are equal, round, and reactive to light. Right eye exhibits no discharge. Left eye exhibits no discharge.   Neck: Normal range of motion. Neck supple. No JVD present.   Cardiovascular: Normal rate, regular rhythm, normal heart sounds and intact distal pulses. Exam reveals no gallop and no friction rub.    No murmur heard.  Pulmonary/Chest: Breath sounds normal. No respiratory distress. He has no wheezes. He has no rhonchi. He has no rales.   Abdominal: Soft. Bowel sounds are normal. He exhibits no distension and no mass. There is abdominal tenderness. There is guarding. There is no rebound.   Mild direct left-sided abdominal tenderness  mostly in lower quad without evidence of rebound.   Musculoskeletal: Normal range of motion. No tenderness or edema.   Lymphadenopathy:     He has no cervical adenopathy.   Neurological: He is alert and oriented to person, place, and time. He has normal strength. GCS score is 15. GCS eye subscore is 4. GCS verbal subscore is 5. GCS motor subscore is 6.   Skin: Skin is warm and dry. Capillary refill takes less than 2 seconds. No rash noted. No erythema. No pallor.   Psychiatric: He has a normal mood and affect. His behavior is normal. Judgment and thought content normal.         ED Course   Procedures  Labs Reviewed   CBC W/ AUTO DIFFERENTIAL - Abnormal; Notable for the following components:       Result Value    MCHC 31.8 (*)     Gran # (ANC) 8.7 (*)     Gran % 78.8 (*)     Lymph % 11.7 (*)     All other components within normal limits   COMPREHENSIVE METABOLIC PANEL - Abnormal; Notable for the following components:    Sodium 134 (*)     All other components within normal limits   SARS-COV-2 RNA AMPLIFICATION, QUAL   PROTIME-INR   URINALYSIS        ECG Results          EKG 12-lead (In process)  Result time 11/18/20 12:19:54    In process by Interface, Lab In [facility] (11/18/20 12:19:54)                 Narrative:    Test Reason : Z01.811,    Vent. Rate : 054 BPM     Atrial Rate : 054 BPM     P-R Int : 140 ms          QRS Dur : 088 ms      QT Int : 506 ms       P-R-T Axes : 065 060 069 degrees     QTc Int : 479 ms    Sinus bradycardia  Nonspecific T wave abnormality  Prolonged QT  Abnormal ECG  When compared with ECG of 27-MAY-2020 07:12,  Nonspecific T wave abnormality has replaced inverted T waves in Anterior  leads    Referred By: [email or web address]   SELF           Confirmed By:                             Imaging Results          X-Ray Chest AP Portable (Final result)  Result time 11/18/20 12:11:26    Final result by Amandeep Dorantes MD (11/18/20 12:11:26)                 Narrative:    REASON: pre op Pre-op Abdominal  Pain,CT shows bowel perf/incaps)  Hx-;  Gout; Date Unknown Hypertension; Date Unknown Kidney stone    FINDINGS:    Portable chest radiograph with comparison chest x-ray May 27, 2020.  The cardiomediastinal silhouette is within normal limits in size.The  pulmonary vascular structures are within normal limits. The lungs are  clear. No acute osseous abnormality.    IMPRESSION:    No acute cardiopulmonary process.    Electronically Signed by Amandeep Dorantes on 11/18/2020 12:18 PM                                            Attending Attestation:             Attending ED Notes:   This 65-year-old male who had presented at the request of his general surgeon for an abnormal CT scan showing a contained small bowel perforation with surrounding fecal material.  During ED course the patient received Levaquin and Flagyl IV.  He did not require any analgesics IV.  Additionally there was a 4 mm left infrarenal stone any anyone mm right infra renal stone without any evidence of any hydronephrosis or ureteral calculi.  The patient's EKG obtained showed a sinus rhythm.  There was no evidence of any acute ischemia ectopy in intervals were normal.  The chest x-ray is unremarkable with no infiltrate.  Labs obtained showed no evidence of any elevated white blood cell count.  Chemistries are normal.  H&H is acceptable.  In light of the findings, hospital Medicine is consulted and the patient will be admitted to a med surg floor.  A consult will also be placed to general surgery.                    Clinical Impression:       ICD-10-CM ICD-9-CM   1. Bowel perforation  K63.1 569.83   2. Small bowel perforation  K63.1 569.83   3. Abscess  L02.91 682.9                          ED Disposition Condition    Admit                             Nitin Potter Jr., MD  11/18/20 [id number]

## 2020-11-18 NOTE — TELEPHONE ENCOUNTER
----- Message from Dunia Hayward sent at 11/18/2020  9:20 AM CST -----  Contact: Radha kent/ Dr. Sanz office  Radha kent/ Dr. Sanz office ph# [phone], requesting to speak to the nurse in regards to a patient who need to be hospitalized.  Radha stated call asap.  Thank you!

## 2020-11-18 NOTE — HPI
Mr. Saravia is a 65 year old male with a history of HTN, HLD, kidney stones, and partial colectomy/polypectomy who presents today with complaints of LLQ pain. It is moderate. Pain began last Thursday, he thought it was possibly a kidney stone, and saw Dr. Alvarez outpatient who ordered an outpt CT abd/pelvis which was done on Monday. Today, he received a call yesterday that his CT showed inflamed edematous small bowel with marked mesenteric inflammation and an apparent 4 cm contained perforation containing feculent material and he was referred to general surgery and was told to come to the ED. He denies fever, chills, N/V/D, dizziness, cough, chest pain, melena, hematochezia or LOC. He states last night he ate steak, fries, and a salad without issue. He had a normal BM last night as well. He remains hemodynamically stable. Dr. Dang saw the patient in the ED and recommends conservative management for now with serial abd exams and a repeat CT abd/pelvis Thursday night to monitor stability as patient is nontoxic. Pt and wife agree to plan. He had an angiogram in may with clear coronaries.

## 2020-11-18 NOTE — FIRST PROVIDER EVALUATION
"Medical screening exam completed.  I have conducted a focused provider triage encounter, findings are as follows:    Brief history of present illness:  Persistent abdominal pain    Vitals:    11/18/20 1123   BP: 130/82   Pulse: 61   Resp: 16   Temp: 98.5 °F (36.9 °C)   TempSrc: Oral   SpO2: 97%   Weight: 77.1 kg (170 lb)   Height: 5' 11" (1.803 m)       Pertinent physical exam:  Patient was sent for outpatient CT imaging yesterday was called this morning instructed to come to the emergency department for surgical intervention patient's CT reveals a contained perforation left lower quadrant  Brief workup plan:  Labs pre op     Preliminary workup initiated; this workup will be continued and followed by the physician or advanced practice provider that is assigned to the patient when roomed.  "

## 2020-11-18 NOTE — TELEPHONE ENCOUNTER
I spoke with Radha and she stated  The pt was advised by Dr Sanz to proceed to the nearest ER for admission.

## 2020-11-18 NOTE — PROGRESS NOTES
This message with brought to my attention by Rosy Dow verbally in the office on 11/13/2020.  The patient was having abdominal pain and was in Dr. Alvarez is office to be seen.  At that time it was not routed into my in basket and I told the nurse to let the patient be seen by Dr. Alvarez who was evaluating the patient at the time.  Apparently this was routed to me verbal instructions were not given to the patient at the time.  Patient was evaluated by Dr. Alvarez and the appropriate course of action was taken.

## 2020-11-18 NOTE — CONSULTS
[facility]  General Surgery  Consult Note    Consults  Subjective:     Chief Complaint/Reason for Admission:  Small bowel perforation    History of Present Illness:  This is a 65-year-old male who started having left lower quadrant abdominal pain that started last Thursday.  He presented to his PCP who recommended getting a CT scan.  This was done on Monday.  He was called and told to go to the ED today after their results were read as a small bowel perforation.  Over the past couple of days, patient has been feeling much improved.  He ate a regular meal yesterday.  He has not been having fevers at home.  He still have some a little bit of pain but for the most part it has resolved.    No current facility-administered medications on file prior to encounter.      Current Outpatient Medications on File Prior to Encounter   Medication Sig    ascorbic acid, vitamin C, (VITAMIN C) 500 MG tablet Take 1 tablet (500 mg total) by mouth once daily.    aspirin 81 MG Chew Take 1 tablet (81 mg total) by mouth once daily.    atenoloL (TENORMIN) 25 MG tablet Take 25 mg by mouth once daily.    biotin 5,000 mcg TbDL Take 5,000 mcg by mouth once daily.     enalapril (VASOTEC) 5 MG tablet Take 2.5 mg by mouth 2 (two) times daily.     glucosamine &chondroit-mv-min3 898-335-50-0.5 mg Tab Take 1 tablet by mouth once daily.      PRAVACHOL 20 mg tablet Take 20 mg by mouth every evening.     saw palmetto 80 MG capsule Take 80 mg by mouth Daily. No Sig Provided    tamsulosin (FLOMAX) 0.4 mg Cap Take 1 capsule (0.4 mg total) by mouth once daily.    triamcinolone acetonide 0.1% (KENALOG) 0.1 % cream APPLY CREAM EXTERNALLY TO AFFECTED AREA TWICE DAILY       Review of patient's allergies indicates:   Allergen Reactions    Uroxatral [alfuzosin]        Past Medical History:   Diagnosis Date    Gout     Hypertension     Kidney stone      Past Surgical History:   Procedure Laterality Date    adnoidectomy       ANGIOGRAM, CORONARY, WITH LEFT HEART CATHETERIZATION Left 5/27/2020    Procedure: Left heart cath;  Surgeon: Nick Kerr MD;  Location: [facility] CATH/EP LAB;  Service: Cardiology;  Laterality: Left;    APPENDECTOMY      COLON SURGERY      colon rescection benign polyp    tonsillectomy       Family History     Problem Relation (Age of Onset)    Diabetes Mother    Hypertension Mother    No Known Problems Father        Tobacco Use    Smoking status: Never Smoker    Smokeless tobacco: Never Used   Substance and Sexual Activity    Alcohol use: No     Frequency: Never     Drinks per session: Patient refused     Binge frequency: Never    Drug use: No    Sexual activity: Not on file     Review of Systems   Constitutional: Negative.  Negative for fatigue and fever.   HENT: Negative.    Eyes: Negative.    Respiratory: Negative.  Negative for shortness of breath.    Cardiovascular: Negative.  Negative for chest pain.   Gastrointestinal: Positive for abdominal pain.   Endocrine: Negative.    Genitourinary: Negative.    Musculoskeletal: Negative.    Skin: Negative.    Allergic/Immunologic: Negative.    Neurological: Negative.    Hematological: Negative.    Psychiatric/Behavioral: Negative.      Objective:     Vital Signs (Most Recent):  Temp: 98.5 °F (36.9 °C) (11/18/20 1123)  Pulse: (!) 54 (11/18/20 1225)  Resp: 16 (11/18/20 1225)  BP: 127/70 (11/18/20 1225)  SpO2: 96 % (11/18/20 1225) Vital Signs (24h Range):  Temp:  [98.5 °F (36.9 °C)] 98.5 °F (36.9 °C)  Pulse:  [54-61] 54  Resp:  [16] 16  SpO2:  [96 %-97 %] 96 %  BP: (127-130)/(70-82) 127/70     Weight: 77.1 kg (170 lb)  Body mass index is 23.71 kg/m².    No intake or output data in the 24 hours ending 11/18/20 1456    Physical Exam  Vitals signs reviewed.   Constitutional:       General: He is not in acute distress.     Appearance: Normal appearance. He is not ill-appearing, toxic-appearing or diaphoretic.   HENT:      Head: Normocephalic.      Nose: Nose  normal.   Eyes:      Conjunctiva/sclera: Conjunctivae normal.   Neck:      Musculoskeletal: Normal range of motion.   Cardiovascular:      Rate and Rhythm: Normal rate and regular rhythm.   Pulmonary:      Effort: Pulmonary effort is normal.   Abdominal:      Palpations: Abdomen is soft.      Comments: Very mild tenderness to palpation in the left lower quadrant.  There is no significant palpable phlegmon.   Musculoskeletal: Normal range of motion.   Skin:     General: Skin is warm.   Neurological:      General: No focal deficit present.      Mental Status: He is alert.   Psychiatric:         Mood and Affect: Mood normal.         Significant Labs:  CBC:   Recent Labs   Lab 11/18/20  1145   WBC 11.06   RBC 4.88   HGB 14.2   HCT 44.7      MCV 92   MCH 29.1   MCHC 31.8*     CMP:   Recent Labs   Lab 11/18/20  1145   GLU 93   CALCIUM 9.6   ALBUMIN 3.5   PROT 7.6   *   K 4.0   CO2 26   CL 97   BUN 9   CREATININE 1.0   ALKPHOS 122   ALT 41   AST 38   BILITOT 0.8     Lactic Acid: No results for input(s): LACTATE in the last 48 hours.    Significant Diagnostics:  CT reviewed:  It appears the patient has a contained small bowel perforation into the mesentery.  There is no free air free fluid.  There is an approximate 4 cm area of feculent contamination with adjacent edematous mesentery    Assessment/Plan:     Active Diagnoses:    Diagnosis Date Noted POA    Bowel perforation [K63.1] 11/18/2020 Yes      Problems Resolved During this Admission:     65-year-old male who was instructed to come to the ED after an outpatient CT scan showed a small bowel perforation that was contained.  Patient states that pain started on Thursday but over the last few days it has drastically improved.  He is not having fevers.  His vitals are stable here in the ED.  He does not have a significant leukocytosis.  He was tolerating regular food at home yesterday.    I discussed operative versus non operative management of his current CT  findings with the patient.  Given his stability, I think treating this in a similar fashion to diverticulitis is reasonable.  He has been placed on antibiotics in the ED. Okay to let him eat tonight.  Will plan for follow-up CT scan tomorrow evening.  If his CT looks improved he can likely go home on antibiotics and will follow-up with me as an outpatient in clinic with subsequent imaging.  If the patient becomes unstable, has an increasing leukocytosis, becomes febrile, ect. we will proceed with exploratory laparotomy and small-bowel resection.  If patient does not have an operation he will need outpatient capsule endoscopy to evaluate this portion of the small bowel versus proceeding with elective bowel resection to exclude underlying malignancy as the cause of the the initial perforation    Thank you for your consult. I will follow-up with patient. Please contact us if you have any additional questions.    Morgan Dang MD  General Surgery  [facility]

## 2020-11-18 NOTE — SUBJECTIVE & OBJECTIVE
Past Medical History:   Diagnosis Date    Gout     Hypertension     Kidney stone        Past Surgical History:   Procedure Laterality Date    adnoidectomy      ANGIOGRAM, CORONARY, WITH LEFT HEART CATHETERIZATION Left 5/27/2020    Procedure: Left heart cath;  Surgeon: Nick Kerr MD;  Location: [facility] CATH/EP LAB;  Service: Cardiology;  Laterality: Left;    APPENDECTOMY      COLON SURGERY      colon rescection benign polyp    tonsillectomy         Review of patient's allergies indicates:   Allergen Reactions    Uroxatral [alfuzosin]        No current facility-administered medications on file prior to encounter.      Current Outpatient Medications on File Prior to Encounter   Medication Sig    ascorbic acid, vitamin C, (VITAMIN C) 500 MG tablet Take 1 tablet (500 mg total) by mouth once daily.    aspirin 81 MG Chew Take 1 tablet (81 mg total) by mouth once daily.    atenoloL (TENORMIN) 25 MG tablet Take 25 mg by mouth once daily.    biotin 5,000 mcg TbDL Take 5,000 mcg by mouth once daily.     enalapril (VASOTEC) 5 MG tablet Take 2.5 mg by mouth 2 (two) times daily.     glucosamine &chondroit-mv-min3 188-583-42-0.5 mg Tab Take 1 tablet by mouth once daily.      PRAVACHOL 20 mg tablet Take 20 mg by mouth every evening.     saw palmetto 80 MG capsule Take 80 mg by mouth Daily. No Sig Provided    tamsulosin (FLOMAX) 0.4 mg Cap Take 1 capsule (0.4 mg total) by mouth once daily.    triamcinolone acetonide 0.1% (KENALOG) 0.1 % cream APPLY CREAM EXTERNALLY TO AFFECTED AREA TWICE DAILY     Family History     Problem Relation (Age of Onset)    Diabetes Mother    Hypertension Mother    No Known Problems Father        Tobacco Use    Smoking status: Never Smoker    Smokeless tobacco: Never Used   Substance and Sexual Activity    Alcohol use: No     Frequency: Never     Drinks per session: Patient refused     Binge frequency: Never    Drug use: No    Sexual activity: Not on file     Review of Systems    Constitutional: Negative for chills, diaphoresis, fatigue and fever.   HENT: Negative for congestion, ear pain, sore throat and trouble swallowing.    Eyes: Negative for pain, discharge and visual disturbance.   Respiratory: Negative for cough, chest tightness, shortness of breath and wheezing.    Cardiovascular: Negative for chest pain, palpitations and leg swelling.   Gastrointestinal: Positive for abdominal pain. Negative for abdominal distention, blood in stool, constipation, diarrhea, nausea and vomiting.   Endocrine: Negative for polydipsia, polyphagia and polyuria.   Genitourinary: Negative for dysuria, flank pain, frequency and urgency.   Musculoskeletal: Negative for back pain, joint swelling, neck pain and neck stiffness.   Skin: Negative for rash and wound.   Allergic/Immunologic: Negative for immunocompromised state.   Neurological: Negative for dizziness, syncope, speech difficulty, weakness, light-headedness, numbness and headaches.   Hematological: Negative for adenopathy.   Psychiatric/Behavioral: Negative for confusion and suicidal ideas. The patient is not nervous/anxious.    All other systems reviewed and are negative.    Objective:     Vital Signs (Most Recent):  Temp: 98.5 °F (36.9 °C) (11/18/20 1123)  Pulse: (!) 53 (11/18/20 1430)  Resp: 20 (11/18/20 1430)  BP: 109/64 (11/18/20 1430)  SpO2: 99 % (11/18/20 1430) Vital Signs (24h Range):  Temp:  [98.5 °F (36.9 °C)] 98.5 °F (36.9 °C)  Pulse:  [53-61] 53  Resp:  [16-20] 20  SpO2:  [96 %-99 %] 99 %  BP: (109-130)/(64-82) 109/64     Weight: 77.1 kg (170 lb)  Body mass index is 23.71 kg/m².    Physical Exam  Vitals signs and nursing note reviewed.   Constitutional:       Appearance: He is well-developed.   HENT:      Head: Normocephalic and atraumatic.   Eyes:      Conjunctiva/sclera: Conjunctivae normal.      Pupils: Pupils are equal, round, and reactive to light.   Neck:      Musculoskeletal: Normal range of motion and neck supple.    Cardiovascular:      Rate and Rhythm: Normal rate and regular rhythm.      Heart sounds: Normal heart sounds.   Pulmonary:      Effort: Pulmonary effort is normal.      Breath sounds: Normal breath sounds.   Abdominal:      General: Bowel sounds are normal.      Palpations: Abdomen is soft.      Tenderness: There is abdominal tenderness.      Comments: Tenderness with deep palpation of the left middle abd   Musculoskeletal: Normal range of motion.   Skin:     General: Skin is warm and dry.      Capillary Refill: Capillary refill takes less than 2 seconds.   Neurological:      Mental Status: He is alert and oriented to person, place, and time.   Psychiatric:         Behavior: Behavior normal.         Thought Content: Thought content normal.         Judgment: Judgment normal.           CRANIAL NERVES     CN III, IV, VI   Pupils are equal, round, and reactive to light.       Significant Labs:   CBC:   Recent Labs   Lab 11/18/20  1145   WBC 11.06   HGB 14.2   HCT 44.7        CMP:   Recent Labs   Lab 11/18/20  1145   *   K 4.0   CL 97   CO2 26   GLU 93   BUN 9   CREATININE 1.0   CALCIUM 9.6   PROT 7.6   ALBUMIN 3.5   BILITOT 0.8   ALKPHOS 122   AST 38   ALT 41   ANIONGAP 11   EGFRNONAA >60.0       Significant Imaging: EKG: I have reviewed all pertinent results/findings within the past 24 hours and my personal findings are: Sinus bradycardia, nonspecific T wave abnormality, QTc 479, no acute ischemic changes     Ct Abdomen Pelvis W Wo Contrast    Result Date: 11/17/2020  EXAMINATION: CT ABDOMEN PELVIS W WO CONTRAST CLINICAL HISTORY: Hematuria, unknown cause;Hematuria, unspecified TECHNIQUE: Low dose axial images, sagittal and coronal reformations were obtained from the lung bases to the pubic symphysis before and following the IV administration of 125 and 32 oz water p.o. COMPARISON: Renal ultrasound September 28, 2020 FINDINGS: The liver is of normal size contour and CT density without focal mass.  The  gallbladder is of normal size without CT evidence of stone.  The pancreas is of normal contour and CT density without edema or mass.  The spleen is of normal size and CT density. The adrenal glands are not enlarged.  The kidneys are of normal size contour and contrast enhancement.  On the right is a 1 mm intrarenal stone without hydronephrosis.  The ureter follows a normal course down to the bladder without dilation or stone distally.  On the left there is a 4 mm intrarenal stone in the lower pole calyx.  No hydronephrosis is noted.  The ureter follows a normal course down to the bladder without dilation or stone distally.  There is several phleboliths identified in the pelvis bilaterally.  The abdominal aorta and inferior vena cava are of normal caliber. The stomach is of normal configuration.  There is diverticulosis of the colon most severe in the descending and sigmoid colon without CT evidence of diverticulitis.  In the left lower quadrant however there is marked inflammation as well as edema of small bowel loops and a probable contained perforation with a collection of feculent material just outside a small bowel loop measuring 4.2 cm.  Free fluid or free air is not identified.  The left ureter runs just behind this area of inflammation without obstruction.  Small bowel dilatation or air-fluid levels consistent with obstruction are not seen. The bladder is of normal contour without mass or asymmetry.  The prostate is not enlarged.     In the left lower quadrant there is an area of inflamed edematous small bowel with marked mesenteric inflammation and an apparent 4 cm contained perforation containing feculent material.  Free air or free fluid is not seen.  The patient does have diverticulosis coli without evidence of acute colonic diverticulitis. There is a 4 mm left intrarenal stone and a 1 mm right intrarenal stone without hydronephrosis or ureteral stones seen. This report was flagged in Epic as abnormal.   [id number] Electronically signed by: Cesar Villasenor MD Date:    11/17/2020 Time:    10:45    X-ray Chest Ap Portable    Result Date: 11/18/2020  REASON: pre op Pre-op Abdominal Pain,CT shows bowel perf/incaps)  Hx-; Gout; Date Unknown Hypertension; Date Unknown Kidney stone FINDINGS: Portable chest radiograph with comparison chest x-ray May 27, 2020. The cardiomediastinal silhouette is within normal limits in size.The pulmonary vascular structures are within normal limits. The lungs are clear. No acute osseous abnormality. IMPRESSION: No acute cardiopulmonary process. Electronically Signed by Amandeep Dorantes on 11/18/2020 12:18 PM

## 2020-11-18 NOTE — H&P
[facility] Medicine  History & Physical    DOS: 11/18/2020  1:39 PM    Patient Name: Carlos Saravia  MRN: [id number]  Admission Date: 11/18/2020  Attending Physician: Dr. Rhodes  Primary Care Provider: Dick Mendez MD         Patient information was obtained from patient, spouse/SO and ER records.     Subjective:     Principal Problem:Small bowel perforation    Chief Complaint:   Chief Complaint   Patient presents with    Abdominal Pain     CT shows bowel perf/incaps        HPI: Mr. Saravia is a 65 year old male with a history of HTN, HLD, kidney stones, and partial colectomy/polypectomy who presents today with complaints of LLQ pain. It is moderate. Pain began last Thursday, he thought it was possibly a kidney stone, and saw Dr. Alvarez outpatient who ordered an outpt CT abd/pelvis which was done on Monday. Today, he received a call yesterday that his CT showed inflamed edematous small bowel with marked mesenteric inflammation and an apparent 4 cm contained perforation containing feculent material and he was referred to general surgery and was told to come to the ED. He denies fever, chills, N/V/D, dizziness, cough, chest pain, melena, hematochezia or LOC. He states last night he ate steak, fries, and a salad without issue. He had a normal BM last night as well. He remains hemodynamically stable. Dr. Dang saw the patient in the ED and recommends conservative management for now with serial abd exams and a repeat CT abd/pelvis Thursday night to monitor stability as patient is nontoxic. Pt and wife agree to plan. He had an angiogram in may with clear coronaries.     Past Medical History:   Diagnosis Date    Gout     Hypertension     Kidney stone        Past Surgical History:   Procedure Laterality Date    adnoidectomy      ANGIOGRAM, CORONARY, WITH LEFT HEART CATHETERIZATION Left 5/27/2020    Procedure: Left heart cath;  Surgeon: Nick Kerr MD;  Location: [facility] CATH/EP LAB;  Service:  Cardiology;  Laterality: Left;    APPENDECTOMY      COLON SURGERY      colon rescection benign polyp    tonsillectomy         Review of patient's allergies indicates:   Allergen Reactions    Uroxatral [alfuzosin]        No current facility-administered medications on file prior to encounter.      Current Outpatient Medications on File Prior to Encounter   Medication Sig    ascorbic acid, vitamin C, (VITAMIN C) 500 MG tablet Take 1 tablet (500 mg total) by mouth once daily.    aspirin 81 MG Chew Take 1 tablet (81 mg total) by mouth once daily.    atenoloL (TENORMIN) 25 MG tablet Take 25 mg by mouth once daily.    biotin 5,000 mcg TbDL Take 5,000 mcg by mouth once daily.     enalapril (VASOTEC) 5 MG tablet Take 2.5 mg by mouth 2 (two) times daily.     glucosamine &chondroit-mv-min3 654-320-20-0.5 mg Tab Take 1 tablet by mouth once daily.      PRAVACHOL 20 mg tablet Take 20 mg by mouth every evening.     saw palmetto 80 MG capsule Take 80 mg by mouth Daily. No Sig Provided    tamsulosin (FLOMAX) 0.4 mg Cap Take 1 capsule (0.4 mg total) by mouth once daily.    triamcinolone acetonide 0.1% (KENALOG) 0.1 % cream APPLY CREAM EXTERNALLY TO AFFECTED AREA TWICE DAILY     Family History     Problem Relation (Age of Onset)    Diabetes Mother    Hypertension Mother    No Known Problems Father        Tobacco Use    Smoking status: Never Smoker    Smokeless tobacco: Never Used   Substance and Sexual Activity    Alcohol use: No     Frequency: Never     Drinks per session: Patient refused     Binge frequency: Never    Drug use: No    Sexual activity: Not on file     Review of Systems   Constitutional: Negative for chills, diaphoresis, fatigue and fever.   HENT: Negative for congestion, ear pain, sore throat and trouble swallowing.    Eyes: Negative for pain, discharge and visual disturbance.   Respiratory: Negative for cough, chest tightness, shortness of breath and wheezing.    Cardiovascular: Negative for chest  pain, palpitations and leg swelling.   Gastrointestinal: Positive for abdominal pain. Negative for abdominal distention, blood in stool, constipation, diarrhea, nausea and vomiting.   Endocrine: Negative for polydipsia, polyphagia and polyuria.   Genitourinary: Negative for dysuria, flank pain, frequency and urgency.   Musculoskeletal: Negative for back pain, joint swelling, neck pain and neck stiffness.   Skin: Negative for rash and wound.   Allergic/Immunologic: Negative for immunocompromised state.   Neurological: Negative for dizziness, syncope, speech difficulty, weakness, light-headedness, numbness and headaches.   Hematological: Negative for adenopathy.   Psychiatric/Behavioral: Negative for confusion and suicidal ideas. The patient is not nervous/anxious.    All other systems reviewed and are negative.    Objective:     Vital Signs (Most Recent):  Temp: 98.5 °F (36.9 °C) (11/18/20 1123)  Pulse: (!) 53 (11/18/20 1430)  Resp: 20 (11/18/20 1430)  BP: 109/64 (11/18/20 1430)  SpO2: 99 % (11/18/20 1430) Vital Signs (24h Range):  Temp:  [98.5 °F (36.9 °C)] 98.5 °F (36.9 °C)  Pulse:  [53-61] 53  Resp:  [16-20] 20  SpO2:  [96 %-99 %] 99 %  BP: (109-130)/(64-82) 109/64     Weight: 77.1 kg (170 lb)  Body mass index is 23.71 kg/m².    Physical Exam  Vitals signs and nursing note reviewed.   Constitutional:       Appearance: He is well-developed.   HENT:      Head: Normocephalic and atraumatic.   Eyes:      Conjunctiva/sclera: Conjunctivae normal.      Pupils: Pupils are equal, round, and reactive to light.   Neck:      Musculoskeletal: Normal range of motion and neck supple.   Cardiovascular:      Rate and Rhythm: Normal rate and regular rhythm.      Heart sounds: Normal heart sounds.   Pulmonary:      Effort: Pulmonary effort is normal.      Breath sounds: Normal breath sounds.   Abdominal:      General: Bowel sounds are normal.      Palpations: Abdomen is soft.      Tenderness: There is abdominal tenderness.       Comments: Tenderness with deep palpation of the left middle abd   Musculoskeletal: Normal range of motion.   Skin:     General: Skin is warm and dry.      Capillary Refill: Capillary refill takes less than 2 seconds.   Neurological:      Mental Status: He is alert and oriented to person, place, and time.   Psychiatric:         Behavior: Behavior normal.         Thought Content: Thought content normal.         Judgment: Judgment normal.           CRANIAL NERVES     CN III, IV, VI   Pupils are equal, round, and reactive to light.       Significant Labs:   CBC:   Recent Labs   Lab 11/18/20  1145   WBC 11.06   HGB 14.2   HCT 44.7        CMP:   Recent Labs   Lab 11/18/20  1145   *   K 4.0   CL 97   CO2 26   GLU 93   BUN 9   CREATININE 1.0   CALCIUM 9.6   PROT 7.6   ALBUMIN 3.5   BILITOT 0.8   ALKPHOS 122   AST 38   ALT 41   ANIONGAP 11   EGFRNONAA >60.0       Significant Imaging: EKG: I have reviewed all pertinent results/findings within the past 24 hours and my personal findings are: Sinus bradycardia, nonspecific T wave abnormality, QTc 479, no acute ischemic changes     Ct Abdomen Pelvis W Wo Contrast    Result Date: 11/17/2020  EXAMINATION: CT ABDOMEN PELVIS W WO CONTRAST CLINICAL HISTORY: Hematuria, unknown cause;Hematuria, unspecified TECHNIQUE: Low dose axial images, sagittal and coronal reformations were obtained from the lung bases to the pubic symphysis before and following the IV administration of 125 and 32 oz water p.o. COMPARISON: Renal ultrasound September 28, 2020 FINDINGS: The liver is of normal size contour and CT density without focal mass.  The gallbladder is of normal size without CT evidence of stone.  The pancreas is of normal contour and CT density without edema or mass.  The spleen is of normal size and CT density. The adrenal glands are not enlarged.  The kidneys are of normal size contour and contrast enhancement.  On the right is a 1 mm intrarenal stone without hydronephrosis.   The ureter follows a normal course down to the bladder without dilation or stone distally.  On the left there is a 4 mm intrarenal stone in the lower pole calyx.  No hydronephrosis is noted.  The ureter follows a normal course down to the bladder without dilation or stone distally.  There is several phleboliths identified in the pelvis bilaterally.  The abdominal aorta and inferior vena cava are of normal caliber. The stomach is of normal configuration.  There is diverticulosis of the colon most severe in the descending and sigmoid colon without CT evidence of diverticulitis.  In the left lower quadrant however there is marked inflammation as well as edema of small bowel loops and a probable contained perforation with a collection of feculent material just outside a small bowel loop measuring 4.2 cm.  Free fluid or free air is not identified.  The left ureter runs just behind this area of inflammation without obstruction.  Small bowel dilatation or air-fluid levels consistent with obstruction are not seen. The bladder is of normal contour without mass or asymmetry.  The prostate is not enlarged.     In the left lower quadrant there is an area of inflamed edematous small bowel with marked mesenteric inflammation and an apparent 4 cm contained perforation containing feculent material.  Free air or free fluid is not seen.  The patient does have diverticulosis coli without evidence of acute colonic diverticulitis. There is a 4 mm left intrarenal stone and a 1 mm right intrarenal stone without hydronephrosis or ureteral stones seen. This report was flagged in Epic as abnormal.  [id number] Electronically signed by: Cesar Villasenor MD Date:    11/17/2020 Time:    10:45    X-ray Chest Ap Portable    Result Date: 11/18/2020  REASON: pre op Pre-op Abdominal Pain,CT shows bowel perf/incaps)  Hx-; Gout; Date Unknown Hypertension; Date Unknown Kidney stone FINDINGS: Portable chest radiograph with comparison chest x-ray May 27, 2020.  The cardiomediastinal silhouette is within normal limits in size.The pulmonary vascular structures are within normal limits. The lungs are clear. No acute osseous abnormality. IMPRESSION: No acute cardiopulmonary process. Electronically Signed by Amandeep Dorantes on 11/18/2020 12:18 PM      Assessment/Plan:     Active Hospital Problems    Diagnosis    *Small bowel perforation    Hyperlipidemia    Hypertension    Kidney stone     PLAN:  Admit to med/tele  Consult Dr. Dang - seen at bedside in ED, plan conservative management, regular diet, levaquin/flagyl, repeat CT abd/pelvis Thursday night   Hold asa   Continue home meds  Nonobstructive renal stones, monitor  Serial abd exams  Pain control    VTE Risk Mitigation (From admission, onward)         Ordered     IP VTE LOW RISK PATIENT  Once      11/18/20 1525     Place sequential compression device  Until discontinued      11/18/20 1525                   Mary Auguste NP  Department of Hospital Medicine   [facility]

## 2020-11-19 VITALS
BODY MASS INDEX: 24.01 KG/M2 | WEIGHT: 171.5 LBS | TEMPERATURE: 99 F | DIASTOLIC BLOOD PRESSURE: 76 MMHG | RESPIRATION RATE: 16 BRPM | HEIGHT: 71 IN | SYSTOLIC BLOOD PRESSURE: 116 MMHG | OXYGEN SATURATION: 97 % | HEART RATE: 55 BPM

## 2020-11-19 LAB
ALBUMIN SERPL BCP-MCNC: 3.2 G/DL (ref 3.5–5.2)
ALP SERPL-CCNC: 118 U/L (ref 55–135)
ALT SERPL W/O P-5'-P-CCNC: 36 U/L (ref 10–44)
ANION GAP SERPL CALC-SCNC: 10 MMOL/L (ref 8–16)
AST SERPL-CCNC: 23 U/L (ref 10–40)
BASOPHILS # BLD AUTO: 0.04 K/UL (ref 0–0.2)
BASOPHILS NFR BLD: 0.4 % (ref 0–1.9)
BILIRUB SERPL-MCNC: 0.8 MG/DL (ref 0.1–1)
BUN SERPL-MCNC: 11 MG/DL (ref 8–23)
CALCIUM SERPL-MCNC: 9 MG/DL (ref 8.7–10.5)
CHLORIDE SERPL-SCNC: 105 MMOL/L (ref 95–110)
CO2 SERPL-SCNC: 25 MMOL/L (ref 23–29)
CREAT SERPL-MCNC: 1 MG/DL (ref 0.5–1.4)
DIFFERENTIAL METHOD: ABNORMAL
EOSINOPHIL # BLD AUTO: 0 K/UL (ref 0–0.5)
EOSINOPHIL NFR BLD: 0.4 % (ref 0–8)
ERYTHROCYTE [DISTWIDTH] IN BLOOD BY AUTOMATED COUNT: 12.9 % (ref 11.5–14.5)
EST. GFR  (AFRICAN AMERICAN): >60 ML/MIN/1.73 M^2
EST. GFR  (NON AFRICAN AMERICAN): >60 ML/MIN/1.73 M^2
GLUCOSE SERPL-MCNC: 100 MG/DL (ref 70–110)
HCT VFR BLD AUTO: 44.4 % (ref 40–54)
HGB BLD-MCNC: 14.1 G/DL (ref 14–18)
IMM GRANULOCYTES # BLD AUTO: 0.04 K/UL (ref 0–0.04)
IMM GRANULOCYTES NFR BLD AUTO: 0.4 % (ref 0–0.5)
LYMPHOCYTES # BLD AUTO: 1.3 K/UL (ref 1–4.8)
LYMPHOCYTES NFR BLD: 11.4 % (ref 18–48)
MAGNESIUM SERPL-MCNC: 2 MG/DL (ref 1.6–2.6)
MCH RBC QN AUTO: 28.7 PG (ref 27–31)
MCHC RBC AUTO-ENTMCNC: 31.8 G/DL (ref 32–36)
MCV RBC AUTO: 90 FL (ref 82–98)
MONOCYTES # BLD AUTO: 0.9 K/UL (ref 0.3–1)
MONOCYTES NFR BLD: 8.1 % (ref 4–15)
NEUTROPHILS # BLD AUTO: 8.9 K/UL (ref 1.8–7.7)
NEUTROPHILS NFR BLD: 79.3 % (ref 38–73)
NRBC BLD-RTO: 0 /100 WBC
PLATELET # BLD AUTO: 296 K/UL (ref 150–350)
PMV BLD AUTO: 9.8 FL (ref 9.2–12.9)
POTASSIUM SERPL-SCNC: 4.1 MMOL/L (ref 3.5–5.1)
PROT SERPL-MCNC: 7.3 G/DL (ref 6–8.4)
RBC # BLD AUTO: 4.91 M/UL (ref 4.6–6.2)
SODIUM SERPL-SCNC: 140 MMOL/L (ref 136–145)
WBC # BLD AUTO: 11.18 K/UL (ref 3.9–12.7)

## 2020-11-19 PROCEDURE — 25000003 PHARM REV CODE 250: Performed by: NURSE PRACTITIONER

## 2020-11-19 PROCEDURE — 80053 COMPREHEN METABOLIC PANEL: CPT

## 2020-11-19 PROCEDURE — 99232 PR SUBSEQUENT HOSPITAL CARE,LEVL II: ICD-10-PCS | Mod: ,,, | Performed by: STUDENT IN AN ORGANIZED HEALTH CARE EDUCATION/TRAINING PROGRAM

## 2020-11-19 PROCEDURE — S0030 INJECTION, METRONIDAZOLE: HCPCS | Performed by: NURSE PRACTITIONER

## 2020-11-19 PROCEDURE — 36415 COLL VENOUS BLD VENIPUNCTURE: CPT

## 2020-11-19 PROCEDURE — 63600175 PHARM REV CODE 636 W HCPCS: Performed by: NURSE PRACTITIONER

## 2020-11-19 PROCEDURE — 99232 SBSQ HOSP IP/OBS MODERATE 35: CPT | Mod: ,,, | Performed by: STUDENT IN AN ORGANIZED HEALTH CARE EDUCATION/TRAINING PROGRAM

## 2020-11-19 PROCEDURE — 83735 ASSAY OF MAGNESIUM: CPT

## 2020-11-19 PROCEDURE — 25500020 PHARM REV CODE 255: Performed by: HOSPITALIST

## 2020-11-19 PROCEDURE — 85025 COMPLETE CBC W/AUTO DIFF WBC: CPT

## 2020-11-19 RX ORDER — LEVOFLOXACIN 750 MG/1
750 TABLET ORAL DAILY
Qty: 14 TABLET | Refills: 0 | Status: SHIPPED | OUTPATIENT
Start: 2020-11-19 | End: 2020-12-03

## 2020-11-19 RX ORDER — METRONIDAZOLE 500 MG/1
500 TABLET ORAL 3 TIMES DAILY
Qty: 42 TABLET | Refills: 0 | Status: SHIPPED | OUTPATIENT
Start: 2020-11-19 | End: 2020-12-03

## 2020-11-19 RX ADMIN — METRONIDAZOLE 500 MG: 500 INJECTION, SOLUTION INTRAVENOUS at 03:11

## 2020-11-19 RX ADMIN — LEVOFLOXACIN 500 MG: 5 INJECTION, SOLUTION INTRAVENOUS at 02:11

## 2020-11-19 RX ADMIN — ENALAPRIL MALEATE 2.5 MG: 2.5 TABLET ORAL at 08:11

## 2020-11-19 RX ADMIN — METRONIDAZOLE 500 MG: 500 INJECTION, SOLUTION INTRAVENOUS at 05:11

## 2020-11-19 RX ADMIN — ATENOLOL 25 MG: 25 TABLET ORAL at 08:11

## 2020-11-19 RX ADMIN — IOHEXOL 100 ML: 350 INJECTION, SOLUTION INTRAVENOUS at 12:11

## 2020-11-19 RX ADMIN — TAMSULOSIN HYDROCHLORIDE 0.4 MG: 0.4 CAPSULE ORAL at 08:11

## 2020-11-19 NOTE — PLAN OF CARE
Problem: Adult Inpatient Plan of Care  Goal: Absence of Hospital-Acquired Illness or Injury  Outcome: Ongoing, Progressing

## 2020-11-19 NOTE — PROGRESS NOTES
[facility]  General Surgery  Progress Note    Subjective:     Interval History:  No events overnight.  Patient feels very well.  He tolerated regular food.  He is having bowel function.    Post-Op Info:  * No surgery found *          Medications:  Continuous Infusions:  Scheduled Meds:   atenoloL  25 mg Oral Daily    enalapril  2.5 mg Oral BID    levoFLOXacin  500 mg Intravenous Q24H    metronidazole  500 mg Intravenous Q8H    pravastatin  20 mg Oral QHS    tamsulosin  0.4 mg Oral Daily     PRN Meds:calcium chloride IVPB, calcium chloride IVPB, calcium chloride IVPB, labetalol, magnesium oxide, magnesium sulfate IVPB, magnesium sulfate IVPB, magnesium sulfate IVPB, magnesium sulfate IVPB, morphine, morphine, ondansetron, potassium chloride in water, potassium chloride in water, potassium chloride in water, potassium chloride in water, potassium chloride, potassium chloride, potassium chloride, potassium chloride, promethazine (PHENERGAN) IVPB, sodium chloride 0.9%, sodium phosphate IVPB, sodium phosphate IVPB, sodium phosphate IVPB, sodium phosphate IVPB, sodium phosphate IVPB     Objective:     Vital Signs (Most Recent):  Temp: 98.1 °F (36.7 °C) (11/19/20 0826)  Pulse: 62 (11/19/20 0826)  Resp: 18 (11/19/20 0826)  BP: 117/76 (11/19/20 0826)  SpO2: 98 % (11/19/20 0826) Vital Signs (24h Range):  Temp:  [98.1 °F (36.7 °C)-98.8 °F (37.1 °C)] 98.1 °F (36.7 °C)  Pulse:  [53-65] 62  Resp:  [16-20] 18  SpO2:  [96 %-100 %] 98 %  BP: (109-130)/(64-82) 117/76       Intake/Output Summary (Last 24 hours) at 11/19/2020 1110  Last data filed at 11/19/2020 0530  Gross per 24 hour   Intake 200 ml   Output 200 ml   Net 0 ml       Physical Exam   No acute distress  Abdomen is soft, mildly tender to palpation in the left mid abdomen    Significant Labs:  CBC:   Recent Labs   Lab 11/19/20 0530   WBC 11.18   RBC 4.91   HGB 14.1   HCT 44.4      MCV 90   MCH 28.7   MCHC 31.8*     CMP:   Recent Labs   Lab  11/19/20  0530      CALCIUM 9.0   ALBUMIN 3.2*   PROT 7.3      K 4.1   CO2 25      BUN 11   CREATININE 1.0   ALKPHOS 118   ALT 36   AST 23   BILITOT 0.8           Assessment/Plan:     Active Diagnoses:    Diagnosis Date Noted POA    PRINCIPAL PROBLEM:  Small bowel perforation [K63.1] 11/18/2020 Yes    Hyperlipidemia [E78.5] 06/24/2019 Yes    Hypertension [I10] 12/21/2017 Yes    Kidney stone [N20.0] 12/21/2017 Yes      Problems Resolved During this Admission:     65-year-old male who was told to come to the ED after an outpatient CT showed a small bowel contained perforation.  Patient had been improving up until that time.  Overnight he is feeling very well.  His vitals have all been stable.  His labs are unremarkable.    I have ordered a CT scan with p.o. and IV contrast.  If the CT is stable or improved, patient can DC later this afternoon.  Would recommend a course of antibiotics.  He will ultimately end up following up with me in clinic with a CT scan in the next 7-10 days to assess stability.  If new findings, will consider operative intervention tomorrow  Continue antibiotics        Morgan Dang MD  General Surgery  [facility]

## 2020-11-19 NOTE — PLAN OF CARE
Discharge home with no needs.      11/19/20 1556   Final Note   Assessment Type Final Discharge Note   Anticipated Discharge Disposition Home

## 2020-11-19 NOTE — DISCHARGE SUMMARY
[facility] Medicine  Discharge Summary      Patient Name: Carlos Saravia  MRN: [id number]  Admission Date: 11/18/2020  Hospital Length of Stay: 1 days  Discharge Date and Time:  11/19/2020 4:23 PM  Attending Physician: Alden Caicedo MD   Discharging Provider: Alden Caicedo MD  Primary Care Provider: Dick Mendez MD      HPI:   Mr. Saravia is a 65 year old male with a history of HTN, HLD, kidney stones, and partial colectomy/polypectomy who presents today with complaints of LLQ pain. It is moderate. Pain began last Thursday, he thought it was possibly a kidney stone, and saw Dr. Alvarez outpatient who ordered an outpt CT abd/pelvis which was done on Monday. Today, he received a call yesterday that his CT showed inflamed edematous small bowel with marked mesenteric inflammation and an apparent 4 cm contained perforation containing feculent material and he was referred to general surgery and was told to come to the ED. He denies fever, chills, N/V/D, dizziness, cough, chest pain, melena, hematochezia or LOC. He states last night he ate steak, fries, and a salad without issue. He had a normal BM last night as well. He remains hemodynamically stable. Dr. Dang saw the patient in the ED and recommends conservative management for now with serial abd exams and a repeat CT abd/pelvis Thursday night to monitor stability as patient is nontoxic. Pt and wife agree to plan. He had an angiogram in may with clear coronaries.     * No surgery found *      Hospital Course:   During his brief hospital stay patient was admitted for contained perforated bowel found on outpatient CT scan.  Since admission patient is absolutely asymptomatic and has no pain, is tolerating regular diet and has no constitutional signs of infection.  He was evaluated by general surgery who recommended conservative management.  Today repeat CT scan was reviewed by general surgeon who cleared him for discharge on 14 more days of  Levaquin and Flagyl.  Patient was advised to follow-up with surgeon within a week of can order outpatient follow-up CT scan.  Patient was advised to come back to ER if he has worsening pain, fever or chills.     Instructions provided to follow up with primary care physician as outpatient. Patient verbalized understanding and is aware to contact primary care physician or return to ED if new or worsening symptoms.    Physical exam on the day of discharge:  General: Patient resting comfortably in no acute distress.  Lungs: CTA. Good air entry.  Cor: Regular rate and rhythm. No murmurs. No pedal edema.  Abd: Soft. Nontender. Non-distended.  Neuro: A&O x3. Moving all 4 extremities equally  Ext: No clubbing. No cyanosis.     Vital signs reviewed.  Nursing notes reviewed.      Consults:   Consults (From admission, onward)        Status Ordering Provider     Inpatient consult to General surgery  Once     Provider:  Morgan Dang MD    Completed VALE CERNA          No new Assessment & Plan notes have been filed under this hospital service since the last note was generated.  Service: Hospital Medicine    Final Active Diagnoses:    Diagnosis Date Noted POA    PRINCIPAL PROBLEM:  Small bowel perforation [K63.1] 11/18/2020 Yes    Hyperlipidemia [E78.5] 06/24/2019 Yes    Hypertension [I10] 12/21/2017 Yes    Kidney stone [N20.0] 12/21/2017 Yes      Problems Resolved During this Admission:       Discharged Condition: good    Disposition: Home or Self Care    Follow Up:  Follow-up Information     Dick Mendez MD In 2 weeks.    Specialty: Family Medicine  Contact information:  [address]  [address]  [place] [address] [phone]             Morgan Dang MD In 1 week.    Specialty: General Surgery  Contact information:  [address]  [address]  [place] [address] [phone]                 Patient Instructions:      Diet Cardiac     Notify your health care provider if you experience any of the following:   temperature >100.4     Notify your health care provider if you experience any of the following:  persistent nausea and vomiting or diarrhea     Notify your health care provider if you experience any of the following:  severe uncontrolled pain     Activity as tolerated       Significant Diagnostic Studies: Labs: All labs within the past 24 hours have been reviewed    Pending Diagnostic Studies:     None         Medications:  Reconciled Home Medications:      Medication List      START taking these medications    levoFLOXacin 750 MG tablet  Commonly known as: LEVAQUIN  Take 1 tablet (750 mg total) by mouth once daily. for 14 days     metroNIDAZOLE 500 MG tablet  Commonly known as: FLAGYL  Take 1 tablet (500 mg total) by mouth 3 (three) times daily. for 14 days        CONTINUE taking these medications    ascorbic acid (vitamin C) 500 MG tablet  Commonly known as: VITAMIN C  Take 1 tablet (500 mg total) by mouth once daily.     aspirin 81 MG Chew  Take 1 tablet (81 mg total) by mouth once daily.     atenoloL 25 MG tablet  Commonly known as: TENORMIN  Take 25 mg by mouth once daily.     biotin 5,000 mcg Tbdl  Take 5,000 mcg by mouth once daily.     enalapril 5 MG tablet  Commonly known as: VASOTEC  Take 2.5 mg by mouth 2 (two) times daily.     glucosamine-chondroit-mv-min3 176-723-33-0.5 mg Tab  Take 1 tablet by mouth once daily.     PravachoL 20 MG tablet  Generic drug: pravastatin  Take 20 mg by mouth every evening.     saw palmetto 80 MG capsule  Take 80 mg by mouth Daily. No Sig Provided     tamsulosin 0.4 mg Cap  Commonly known as: FLOMAX  Take 1 capsule (0.4 mg total) by mouth once daily.     triamcinolone acetonide 0.1% 0.1 % cream  Commonly known as: KENALOG  APPLY CREAM EXTERNALLY TO AFFECTED AREA TWICE DAILY            Indwelling Lines/Drains at time of discharge:   Lines/Drains/Airways     None                 Time spent on the discharge of patient: 31 minutes  Patient was seen and examined on the date of  discharge and determined to be suitable for discharge.         Alden Caicedo MD  Department of Hospital Medicine  [facility]

## 2020-11-19 NOTE — PLAN OF CARE
Patient verified address as: [address] [place] [address]  Phone Number: [phone]  PCP- Dick Mendez MD  Pharmacy-  [facility]  Insurance- Medicare and [facility]- None  Dialysis- None  Advance Directive- No       11/19/20 1110   Discharge Assessment   Assessment Type Discharge Planning Assessment   Confirmed/corrected address and phone number on facesheet? Yes   Assessment information obtained from? Patient   Prior to hospitilization cognitive status: Alert/Oriented   Prior to hospitalization functional status: Independent   Current cognitive status: Alert/Oriented   Current Functional Status: Independent   Lives With spouse   Able to Return to Prior Arrangements yes   Is patient able to care for self after discharge? Yes   Patient currently being followed by outpatient case management? No   Patient currently receives any other outside agency services? No   Equipment Currently Used at Home none   Do you have any problems affording any of your prescribed medications? No   Is the patient taking medications as prescribed? yes   Does the patient have transportation home? Yes   Transportation Anticipated family or friend will provide   Dialysis Name and Scheduled days none   Does the patient receive services at the [facility]? No   Discharge Plan A Home   Discharge Plan B Home   DME Needed Upon Discharge  none   Patient/Family in Agreement with Plan yes

## 2020-11-19 NOTE — HOSPITAL COURSE
During his brief hospital stay patient was admitted for contained perforated bowel found on outpatient CT scan.  Since admission patient is absolutely asymptomatic and has no pain, is tolerating regular diet and has no constitutional signs of infection.  He was evaluated by general surgery who recommended conservative management.  Today repeat CT scan was reviewed by general surgeon who cleared him for discharge on 14 more days of Levaquin and Flagyl.  Patient was advised to follow-up with surgeon within a week of can order outpatient follow-up CT scan.  Patient was advised to come back to ER if he has worsening pain, fever or chills.     Instructions provided to follow up with primary care physician as outpatient. Patient verbalized understanding and is aware to contact primary care physician or return to ED if new or worsening symptoms.    Physical exam on the day of discharge:  General: Patient resting comfortably in no acute distress.  Lungs: CTA. Good air entry.  Cor: Regular rate and rhythm. No murmurs. No pedal edema.  Abd: Soft. Nontender. Non-distended.  Neuro: A&O x3. Moving all 4 extremities equally  Ext: No clubbing. No cyanosis.     Vital signs reviewed.  Nursing notes reviewed.

## 2020-11-20 ENCOUNTER — DOCUMENTATION ONLY (OUTPATIENT)
Dept: FAMILY MEDICINE | Facility: CLINIC | Age: 65
End: 2020-11-20

## 2020-11-20 DIAGNOSIS — K56.609 SMALL BOWEL OBSTRUCTION: Primary | ICD-10-CM

## 2020-11-20 NOTE — PROGRESS NOTES
Left a message on patient's answering machine there was a mixup last Friday when he was having abdominal pain was being seen by Dr. Alvarez he did up having a small bowel perforation which Dr. Alvarez diagnosed in took care of and was admitted etc. he was upset about the lack of call back and I left a message to the in that regard.

## 2020-11-24 ENCOUNTER — HOSPITAL ENCOUNTER (OUTPATIENT)
Dept: RADIOLOGY | Facility: HOSPITAL | Age: 65
Discharge: HOME | End: 2020-11-24
Attending: STUDENT IN AN ORGANIZED HEALTH CARE EDUCATION/TRAINING PROGRAM
Payer: MEDICARE

## 2020-11-24 DIAGNOSIS — K56.609 SMALL BOWEL OBSTRUCTION: ICD-10-CM

## 2020-11-24 PROCEDURE — 74177 CT CHEST ABDOMEN PELVIS WITH CONTRAST (XPD): ICD-10-PCS | Mod: 26,,, | Performed by: RADIOLOGY

## 2020-11-24 PROCEDURE — 74177 CT ABD & PELVIS W/CONTRAST: CPT | Mod: 26,,, | Performed by: RADIOLOGY

## 2020-11-24 PROCEDURE — 25500020 PHARM REV CODE 255

## 2020-11-24 PROCEDURE — 74177 CT ABD & PELVIS W/CONTRAST: CPT | Mod: TC

## 2020-11-24 PROCEDURE — A9698 NON-RAD CONTRAST MATERIALNOC: HCPCS

## 2020-11-24 PROCEDURE — 71260 CT THORAX DX C+: CPT | Mod: 26,,, | Performed by: RADIOLOGY

## 2020-11-24 PROCEDURE — 71260 CT CHEST ABDOMEN PELVIS WITH CONTRAST (XPD): ICD-10-PCS | Mod: 26,,, | Performed by: RADIOLOGY

## 2020-11-24 RX ADMIN — IOHEXOL 1000 ML: 9 SOLUTION ORAL at 09:11

## 2020-11-24 RX ADMIN — IOHEXOL 75 ML: 350 INJECTION, SOLUTION INTRAVENOUS at 09:11

## 2020-11-24 NOTE — PHYSICIAN QUERY
PT Name: Carlos Saravia  MR #: [id number]     Etiology of Condition Clarification      CDS/: Rufina Joseph               Contact information:[phone] or through  This form is a permanent document in the medical record.     Query Date: November 24, 2020    By submitting this query, we are merely seeking further clarification of documentation.  Please utilize your independent clinical judgment when addressing the question(s) below.     The Medical Record contains the following:    Clinical Information   patient was admitted for contained perforated bowel found on outpatient CT scan.    Given his stability, I think treating this in a similar fashion to diverticulitis is reasonable.  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       Please document your best medical opinion regarding the etiology of PERFORATED BOWEL?       [ x ]  Perforated Bowel due to diverticulitis   [  ] Clinically Undetermined

## 2020-11-26 ENCOUNTER — PATIENT MESSAGE (OUTPATIENT)
Dept: SURGERY | Facility: CLINIC | Age: 65
End: 2020-11-26

## 2020-11-27 ENCOUNTER — TELEPHONE (OUTPATIENT)
Dept: FAMILY MEDICINE | Facility: CLINIC | Age: 65
End: 2020-11-27

## 2020-11-27 NOTE — TELEPHONE ENCOUNTER
MD Radha Rizvi MA  Caller: Unspecified (Today,  1:48 PM)  Dark stools may continue 1 week after diverticulitis.  If you have bright red blood feel weak dizzy, vomiting or short of breath he need to go back to the hospital.  Patient advised of 's recommendations

## 2020-11-27 NOTE — TELEPHONE ENCOUNTER
Patient was prescribed Flagyl and Levaquin by Dr. Caicedo due Small Bowel obstruction vs diverticulitis.  He is now having dark stools not black per patient, no diarrhea, no vomiting, and no abdominal pain.  He left a message for Dr. Caicedo office however he has not received a call.  What are your recommendations

## 2020-12-02 ENCOUNTER — OFFICE VISIT (OUTPATIENT)
Dept: FAMILY MEDICINE | Facility: CLINIC | Age: 65
End: 2020-12-02
Payer: MEDICARE

## 2020-12-02 VITALS
BODY MASS INDEX: 24.43 KG/M2 | OXYGEN SATURATION: 97 % | TEMPERATURE: 98 F | DIASTOLIC BLOOD PRESSURE: 70 MMHG | HEIGHT: 71 IN | WEIGHT: 174.5 LBS | RESPIRATION RATE: 16 BRPM | HEART RATE: 56 BPM | SYSTOLIC BLOOD PRESSURE: 108 MMHG

## 2020-12-02 DIAGNOSIS — N40.0 PROSTATISM: ICD-10-CM

## 2020-12-02 DIAGNOSIS — Z12.5 ENCOUNTER FOR PROSTATE CANCER SCREENING: ICD-10-CM

## 2020-12-02 DIAGNOSIS — Z12.11 SCREENING FOR COLON CANCER: ICD-10-CM

## 2020-12-02 DIAGNOSIS — E78.5 HYPERLIPIDEMIA, UNSPECIFIED HYPERLIPIDEMIA TYPE: Primary | ICD-10-CM

## 2020-12-02 PROCEDURE — 99214 OFFICE O/P EST MOD 30 MIN: CPT | Mod: S$PBB,,, | Performed by: FAMILY MEDICINE

## 2020-12-02 PROCEDURE — 99215 OFFICE O/P EST HI 40 MIN: CPT | Performed by: FAMILY MEDICINE

## 2020-12-02 PROCEDURE — 99214 PR OFFICE/OUTPT VISIT, EST, LEVL IV, 30-39 MIN: ICD-10-PCS | Mod: S$PBB,,, | Performed by: FAMILY MEDICINE

## 2020-12-02 NOTE — PROGRESS NOTES
Subjective:       Patient ID: aCrlos Saravia is a 65 y.o. male.    Chief Complaint: Hospital Follow Up (SBO vs Diverticulitis )      Patient is here for hospital follow-up he was admitted on November 18th with a possible abdominal perforation of the small intestine, that was discovered on an outpatient CT scan he was admitted but was having normal bowel function including normal bowel movements no abdominal pain an was subsequently discharged without having to have surgery.   Discharge and current medications have been reviewed and reconciled with the chart.  Patient did have some dark stools but no gross blood no black stools no fever no vomiting nausea and has had normal bowel movements after discharge    Lab Results       Component                Value               Date                       WBC                      11.18               11/19/2020                 HGB                      14.1                11/19/2020                 HCT                      44.4                11/19/2020                 PLT                      296                 11/19/2020                 CHOL                     149                 06/24/2020                 TRIG                     93                  06/24/2020                 HDL                      43                  06/24/2020                 ALT                      36                  11/19/2020                 AST                      23                  11/19/2020                 NA                       140                 11/19/2020                 K                        4.1                 11/19/2020                 CL                       105                 11/19/2020                 CREATININE               1.0                 11/19/2020                 BUN                      11                  11/19/2020                 CO2                      25                  11/19/2020                 TSH                      1.450               05/27/2020                  PSA                      1.8                 01/03/2020                 INR                      1.1                 11/18/2020                 HGBA1C                   5.5                 12/21/2017                  Allergies and Medications:   Review of patient's allergies indicates:   Allergen Reactions    Uroxatral [alfuzosin]      Current Outpatient Medications   Medication Sig Dispense Refill    atenoloL (TENORMIN) 25 MG tablet Take 25 mg by mouth once daily.      biotin 5,000 mcg TbDL Take 5,000 mcg by mouth once daily.       enalapril (VASOTEC) 5 MG tablet Take 5 mg by mouth 2 (two) times daily.       glucosamine &chondroit-mv-min3 796-735-98-0.5 mg Tab Take 1 tablet by mouth once daily.        levoFLOXacin (LEVAQUIN) 750 MG tablet Take 1 tablet (750 mg total) by mouth once daily. for 14 days 14 tablet 0    metroNIDAZOLE (FLAGYL) 500 MG tablet Take 1 tablet (500 mg total) by mouth 3 (three) times daily. for 14 days 42 tablet 0    PRAVACHOL 20 mg tablet Take 20 mg by mouth every evening.       saw palmetto 80 MG capsule Take 80 mg by mouth Daily. No Sig Provided      tamsulosin (FLOMAX) 0.4 mg Cap Take 1 capsule (0.4 mg total) by mouth once daily. 30 capsule 11    ascorbic acid, vitamin C, (VITAMIN C) 500 MG tablet Take 1 tablet (500 mg total) by mouth once daily. (Patient not taking: Reported on 12/2/2020)      aspirin 81 MG Chew Take 1 tablet (81 mg total) by mouth once daily. (Patient not taking: Reported on 12/2/2020)  0    triamcinolone acetonide 0.1% (KENALOG) 0.1 % cream APPLY CREAM EXTERNALLY TO AFFECTED AREA TWICE DAILY       No current facility-administered medications for this visit.        Family History:   Family History   Problem Relation Age of Onset    Hypertension Mother     Diabetes Mother     No Known Problems Father        Social History:   Social History     Socioeconomic History    Marital status:      Spouse name: Not on file    Number of children: Not on  file    Years of education: Not on file    Highest education level: Not on file   Occupational History    Not on file   Social Needs    Financial resource strain: Not hard at all    Food insecurity     Worry: Never true     Inability: Never true    Transportation needs     Medical: No     Non-medical: No   Tobacco Use    Smoking status: Never Smoker    Smokeless tobacco: Never Used   Substance and Sexual Activity    Alcohol use: No     Frequency: Never     Drinks per session: Patient refused     Binge frequency: Never    Drug use: No    Sexual activity: Not on file   Lifestyle    Physical activity     Days per week: 4 days     Minutes per session: 90 min    Stress: Not at all   Relationships    Social connections     Talks on phone: More than three times a week     Gets together: More than three times a week     Attends [employer] service: Not on file     Active member of club or organization: No     Attends meetings of clubs or organizations: Never     Relationship status:    Other Topics Concern    Not on file   Social History Narrative    Not on file       Review of Systems    Objective:     Vitals:    12/02/20 1315   BP: 108/70   Pulse: (!) 56   Resp: 16   Temp: 97.5 °F (36.4 °C)        Physical Exam  Vitals signs and nursing note reviewed.   Constitutional:       Appearance: He is well-developed. He is not diaphoretic.   HENT:      Head: Normocephalic.   Eyes:      Conjunctiva/sclera: Conjunctivae normal.      Pupils: Pupils are equal, round, and reactive to light.   Cardiovascular:      Rate and Rhythm: Normal rate and regular rhythm.      Heart sounds: Normal heart sounds. No murmur. No friction rub. No gallop.    Pulmonary:      Effort: Pulmonary effort is normal. No respiratory distress.      Breath sounds: Normal breath sounds. No stridor. No wheezing or rales.   Chest:      Chest wall: No tenderness.   Abdominal:      General: There is no distension.      Palpations: There is no  mass.      Tenderness: There is no abdominal tenderness.      Hernia: No hernia is present.   Skin:     General: Skin is warm and dry.   Psychiatric:         Behavior: Behavior normal.         Thought Content: Thought content normal.         Judgment: Judgment normal.         Assessment:       1. Hyperlipidemia, unspecified hyperlipidemia type    2. Encounter for prostate cancer screening    3. Screening for colon cancer    4. Prostatism        Plan:       Carlos was seen today for hospital follow up.    Diagnoses and all orders for this visit:    Hyperlipidemia, unspecified hyperlipidemia type  -     Lipid Panel; Future  -     Comprehensive Metabolic Panel; Future    Encounter for prostate cancer screening    Screening for colon cancer  -     Ambulatory referral/consult to Gastroenterology; Future    Prostatism  -     Prostate Specific Antigen, Diagnostic; Future         Follow up in about 29 days (around 12/31/2020) for follow up cholesterol, follow up HTN.

## 2020-12-03 ENCOUNTER — OFFICE VISIT (OUTPATIENT)
Dept: SURGERY | Facility: CLINIC | Age: 65
End: 2020-12-03
Payer: MEDICARE

## 2020-12-03 VITALS — BODY MASS INDEX: 24.41 KG/M2 | WEIGHT: 175.06 LBS

## 2020-12-03 DIAGNOSIS — K63.1 SMALL BOWEL PERFORATION: Primary | ICD-10-CM

## 2020-12-03 PROCEDURE — 99213 OFFICE O/P EST LOW 20 MIN: CPT | Mod: PBBFAC,PN | Performed by: STUDENT IN AN ORGANIZED HEALTH CARE EDUCATION/TRAINING PROGRAM

## 2020-12-03 PROCEDURE — 99212 OFFICE O/P EST SF 10 MIN: CPT | Mod: S$PBB,,, | Performed by: STUDENT IN AN ORGANIZED HEALTH CARE EDUCATION/TRAINING PROGRAM

## 2020-12-03 PROCEDURE — 99212 PR OFFICE/OUTPT VISIT, EST, LEVL II, 10-19 MIN: ICD-10-PCS | Mod: S$PBB,,, | Performed by: STUDENT IN AN ORGANIZED HEALTH CARE EDUCATION/TRAINING PROGRAM

## 2020-12-03 PROCEDURE — 99999 PR PBB SHADOW E&M-EST. PATIENT-LVL III: ICD-10-PCS | Mod: PBBFAC,,, | Performed by: STUDENT IN AN ORGANIZED HEALTH CARE EDUCATION/TRAINING PROGRAM

## 2020-12-03 PROCEDURE — 99999 PR PBB SHADOW E&M-EST. PATIENT-LVL III: CPT | Mod: PBBFAC,,, | Performed by: STUDENT IN AN ORGANIZED HEALTH CARE EDUCATION/TRAINING PROGRAM

## 2020-12-03 NOTE — PROGRESS NOTES
Follow-up note    Patient is being seen in follow-up after recent admission at [facility].  At that time he had an outpatient CT scan that was concerning for a small bowel perforation.  Prior to the scan patient was having some lower abdominal pain that had nearly resolved by the time he was told to come to the ED.  We observed him for 24 hr and repeated a CT scan which showed no worsening of the area of concern.  It was favor that this may represent a small bowel diverticulum.  In the antrum patient had a follow-up CT scan which shows resolution of some of the inflammatory changes in that area.  Overall patient is doing well.  He is tolerating regular food without pain.  Moving his bowels normally.    Vitals are stable  Abdomen soft    CT reviewed as above    Overall doing well.  His initial presenting symptom was likely a small contained perforation of small bowel diverticulum.  This seems to have resolved on its own.  His most recent CT scan looks like that diverticula still present which is what I would expect.  Overall no surgical indications.  Patient may follow up p.r.n..

## 2020-12-24 ENCOUNTER — LAB VISIT (OUTPATIENT)
Dept: LAB | Facility: HOSPITAL | Age: 65
End: 2020-12-24
Attending: FAMILY MEDICINE
Payer: MEDICARE

## 2020-12-24 DIAGNOSIS — E78.5 HYPERLIPIDEMIA, UNSPECIFIED HYPERLIPIDEMIA TYPE: ICD-10-CM

## 2020-12-24 DIAGNOSIS — N40.0 PROSTATISM: ICD-10-CM

## 2020-12-24 LAB
ALBUMIN SERPL BCP-MCNC: 4.2 G/DL (ref 3.5–5.2)
ALP SERPL-CCNC: 61 U/L (ref 55–135)
ALT SERPL W/O P-5'-P-CCNC: 38 U/L (ref 10–44)
ANION GAP SERPL CALC-SCNC: 10 MMOL/L (ref 8–16)
AST SERPL-CCNC: 30 U/L (ref 10–40)
BILIRUB SERPL-MCNC: 0.8 MG/DL (ref 0.1–1)
BUN SERPL-MCNC: 11 MG/DL (ref 8–23)
CALCIUM SERPL-MCNC: 9.6 MG/DL (ref 8.7–10.5)
CHLORIDE SERPL-SCNC: 106 MMOL/L (ref 95–110)
CHOLEST SERPL-MCNC: 164 MG/DL (ref 120–199)
CHOLEST/HDLC SERPL: 3.5 {RATIO} (ref 2–5)
CO2 SERPL-SCNC: 25 MMOL/L (ref 23–29)
COMPLEXED PSA SERPL-MCNC: 2 NG/ML (ref 0–4)
CREAT SERPL-MCNC: 0.9 MG/DL (ref 0.5–1.4)
EST. GFR  (AFRICAN AMERICAN): >60 ML/MIN/1.73 M^2
EST. GFR  (NON AFRICAN AMERICAN): >60 ML/MIN/1.73 M^2
GLUCOSE SERPL-MCNC: 85 MG/DL (ref 70–110)
HDLC SERPL-MCNC: 47 MG/DL (ref 40–75)
HDLC SERPL: 28.7 % (ref 20–50)
LDLC SERPL CALC-MCNC: 96.4 MG/DL (ref 63–159)
NONHDLC SERPL-MCNC: 117 MG/DL
POTASSIUM SERPL-SCNC: 4 MMOL/L (ref 3.5–5.1)
PROT SERPL-MCNC: 7.3 G/DL (ref 6–8.4)
SODIUM SERPL-SCNC: 141 MMOL/L (ref 136–145)
TRIGL SERPL-MCNC: 103 MG/DL (ref 30–150)

## 2020-12-24 PROCEDURE — 84153 ASSAY OF PSA TOTAL: CPT

## 2020-12-24 PROCEDURE — 36415 COLL VENOUS BLD VENIPUNCTURE: CPT

## 2020-12-24 PROCEDURE — 80053 COMPREHEN METABOLIC PANEL: CPT

## 2020-12-24 PROCEDURE — 80061 LIPID PANEL: CPT

## 2020-12-24 NOTE — PROGRESS NOTES
[facility]    [address] [address]    Phone:  [phone]    Fax:  [phone]       Thank You for choosing us for your health care visit. We are glad to serve you and happy to provide you with this summary of your visit. Please help us to ensure we have accurate records. If you find anything that needs to be changed, please let our staff know as soon as possible.          Your Demographic Information     Patient Name Sex Sharon Townsend Female 1991       Ethnic Group Patient Race    Not of  or  Origin White      Your Visit Details     Date & Time Provider Department    2017 8:20 AM Vianca Avitia MD [facility]      Your Upcoming Appointment*(Max 10)     2017  4:15 PM CDT   Pap/Pelvic Exam with Kaylie Ochoa NP   [facility]    [address] [address] [phone]              Conditions Discussed Today or Order-Related Diagnoses        Comments    Drug rash    -  Primary       Your Vitals Were     BP Pulse Temp Height Weight LMP    134/78 113 97.9 °F (36.6 °C) (Tympanic) 5' 1\" (1.549 m) 148 lb (67.1 kg) 2017    BMI Smoking Status                27.96 kg/m2 Current Every Day Smoker          Medications Prescribed or Re-Ordered Today     predniSONE (DELTASONE) 10 MG tablet    Sig: Prednisone 5 day Taper  5 tabs PO Day 1-- 4 tabs PO Day 2-- 3 tabs PO Day 3-- 2 tabs PO Day 4-- 1 tab PO Day 5    Class: Eprescribe    Pharmacy: [id number] - [place], [place] - [address] Ph #: [phone]      Your Current Medications Are        Disp Refills Start End    levonorgestrel-ethinyl estradiol (SEASONALE) 0.15-0.03 MG per tablet 91 tablet 0 2017     Sig - Route: Take 1 tablet by mouth daily. - Oral    Class: Eprescribe    predniSONE (DELTASONE) 10 MG tablet 15 tablet 0 2017     Sig: Prednisone 5 day Taper  5 
Cholesterol is much improved at 164, PSA was normal at 2.0.  Other labs within normal limits.  Continue present therapy and recheck in 6 months.
tabs PO Day 1-- 4 tabs PO Day 2-- 3 tabs PO Day 3-- 2 tabs PO Day 4-- 1 tab PO Day 5    Class: Eprescribe    sulfamethoxazole-trimethoprim (BACTRIM DS) 800-160 MG per tablet 14 tablet 0 5/30/2017     Sig - Route: Take 1 tablet by mouth 2 times daily. - Oral    cefadroxil (DURICEF) 500 MG capsule 14 capsule 0 5/30/2017     Sig - Route: Take 1 capsule by mouth 2 times daily. - Oral    lidocaine viscous (XYLOCAINE) 2 % solution 100 mL 0 9/24/2015     Sig - Route: Take 15 mLs by mouth as needed for Pain. - Mouth/Throat    Class: Eprescribe    fluticasone (FLONASE) 50 MCG/ACT nasal spray 16 g 1 6/4/2015     Sig - Route: Spray 2 sprays in each nostril daily. - Nasal    Class: Eprescribe    ibuprofen (MOTRIN) 800 MG tablet        Sig - Route: Take 800 mg by mouth every 6 hours as needed. - Oral    Class: Historical Med    traMADOL (ULTRAM) 50 MG tablet 10 tablet 0 5/20/2014     Sig - Route: Take 1 tablet by mouth every 6 hours as needed for Pain. - Oral    Class: Eprescribe      Allergies     Flexeril [Cyclobenzaprine Hcl] Muscle Spasm      Immunizations History as of 6/8/2017     Name Date    Ceftriaxone 11/21/2013  3:00 PM    Toradol 5/20/2014      Problem List as of 6/8/2017     Dysmenorrhea              Patient Instructions      Erythema Multiforme  Erythema multiforme is a skin rash. It’s cause by a hypersensitive reaction. The reaction can be caused by many things. These include a virus, bacteria, fungus, medicine, vaccine, or food. Illnesses such as pneumonia and herpes can also cause it.  At first, the skin may have round red bumps, fluid-filled blisters, or pimples. Most of these turn into a round [place] with a small dark center. The entire area of the skin may be surrounded by a white ring. The sores may cause pain, burning, or itching. They occur most often on the forearms, legs, and back of hands and feet. They can spread to the abdomen, back, face, genitals, and mouth in severe cases. They can’t be passed from 
person to person. You may also have a fever and muscle aches.  Treatment includes finding and removing the cause. If a medicine may be the cause, you will be told to stop taking it. The sores will likely go away in 2 to 3 weeks without treatment. It may take longer in severe cases. The sores may come back. Medicine to reduce pain and inflammation may be given. Antiviral medicine may be given. If any sores become infected, an antibiotic may be prescribed. This condition is not contagious.  Home care  Follow these tips:  · Apply over-the-counter hydrocortisone cream to the rash.  · Soak in a bath with colloidal oatmeal added to the water. This can help relieve itching and pain.  · If the rash was caused by a medicine, make sure to tell future healthcare providers that you are allergic to it.  · You can take acetaminophen for fever and to ease pain.  Follow-up care  Follow up with your health care provider.  When to seek medical advice  Call your healthcare provider right away if any of these occur:   · Inability to eat or swallow because you have mouth pain  · Trouble breathing  · Weakness, dizziness, or fainting  · Swelling or tightness of the throat and trouble swallowing  · Fever of 100.4°F (38.0°C) or higher  · The rash comes back after it goes away  © 7715-6909 [employer]. [address] [place] [address], [place] [address]. All rights reserved. This information is not intended as a substitute for professional medical care. Always follow your healthcare professional's instructions.             
Yes...

## 2020-12-28 ENCOUNTER — TELEPHONE (OUTPATIENT)
Dept: FAMILY MEDICINE | Facility: CLINIC | Age: 65
End: 2020-12-28

## 2020-12-28 NOTE — TELEPHONE ENCOUNTER
----- Message from Dick Mendez MD sent at 12/24/2020 11:18 AM CST -----  Cholesterol is much improved at 164, PSA was normal at 2.0.  Other labs within normal limits.  Continue present therapy and recheck in 6 months.

## 2020-12-30 ENCOUNTER — OFFICE VISIT (OUTPATIENT)
Dept: FAMILY MEDICINE | Facility: CLINIC | Age: 65
End: 2020-12-30
Payer: MEDICARE

## 2020-12-30 VITALS
DIASTOLIC BLOOD PRESSURE: 78 MMHG | BODY MASS INDEX: 24.73 KG/M2 | HEART RATE: 65 BPM | TEMPERATURE: 98 F | SYSTOLIC BLOOD PRESSURE: 124 MMHG | WEIGHT: 176.63 LBS | OXYGEN SATURATION: 99 % | HEIGHT: 71 IN | RESPIRATION RATE: 17 BRPM

## 2020-12-30 DIAGNOSIS — E78.5 HYPERLIPIDEMIA, UNSPECIFIED HYPERLIPIDEMIA TYPE: Primary | ICD-10-CM

## 2020-12-30 DIAGNOSIS — I10 ESSENTIAL HYPERTENSION: ICD-10-CM

## 2020-12-30 DIAGNOSIS — N40.0 PROSTATE ENLARGEMENT: ICD-10-CM

## 2020-12-30 PROCEDURE — 99214 PR OFFICE/OUTPT VISIT, EST, LEVL IV, 30-39 MIN: ICD-10-PCS | Mod: S$PBB,,, | Performed by: FAMILY MEDICINE

## 2020-12-30 PROCEDURE — 99214 OFFICE O/P EST MOD 30 MIN: CPT | Mod: S$PBB,,, | Performed by: FAMILY MEDICINE

## 2020-12-30 PROCEDURE — 99214 OFFICE O/P EST MOD 30 MIN: CPT | Performed by: FAMILY MEDICINE

## 2020-12-30 NOTE — PROGRESS NOTES
Subjective:       Patient ID: Carlos Saravia is a 65 y.o. male.    Chief Complaint: Hyperlipidemia (6 month F/U; ED)      Patient is here to follow-up on his cholesterol.  He has also been seeing Dr. Alvarez, but is not taking anything for ED at this time. Has prostatism.  Lab Results       Component                Value               Date                       CHOL                     164                 12/24/2020                 CHOL                     149                 06/24/2020                 CHOL                     242 (H)             05/27/2020            Lab Results       Component                Value               Date                       HDL                      47                  12/24/2020                 HDL                      43                  06/24/2020                 HDL                      38 (L)              05/27/2020            Lab Results       Component                Value               Date                       LDLCALC                  96.4                12/24/2020                 LDLCALC                  87.4                06/24/2020                 LDLCALC                  169.4 (H)           05/27/2020            Lab Results       Component                Value               Date                       TRIG                     103                 12/24/2020                 TRIG                     93                  06/24/2020                 TRIG                     173 (H)             05/27/2020            Lab Results       Component                Value               Date                       CHOLHDL                  28.7                12/24/2020                 CHOLHDL                  28.9                06/24/2020                 CHOLHDL                  15.7 (L)            05/27/2020            Patient had an angiogram and has no coronary disease.  Lab Results       Component                Value               Date                       WBC                       11.18               11/19/2020                 HGB                      14.1                11/19/2020                 HCT                      44.4                11/19/2020                 PLT                      296                 11/19/2020                 CHOL                     164                 12/24/2020                 TRIG                     103                 12/24/2020                 HDL                      47                  12/24/2020                 ALT                      38                  12/24/2020                 AST                      30                  12/24/2020                 NA                       141                 12/24/2020                 K                        4.0                 12/24/2020                 CL                       106                 12/24/2020                 CREATININE               0.9                 12/24/2020                 BUN                      11                  12/24/2020                 CO2                      25                  12/24/2020                 TSH                      1.450               05/27/2020                 PSA                      1.8                 01/03/2020                 INR                      1.1                 11/18/2020                 HGBA1C                   5.5                 12/21/2017                  Hyperlipidemia  This is a chronic problem. Recent lipid tests were reviewed and are normal. Factors aggravating his hyperlipidemia include beta blockers. Pertinent negatives include no chest pain, focal sensory loss, focal weakness, leg pain, myalgias or shortness of breath. The current treatment provides moderate improvement of lipids.       Allergies and Medications:   Review of patient's allergies indicates:   Allergen Reactions    Uroxatral [alfuzosin]      Current Outpatient Medications   Medication Sig Dispense Refill    ascorbic acid, vitamin C, (VITAMIN C) 500 MG tablet Take 1 tablet (500  mg total) by mouth once daily.      aspirin 81 MG Chew Take 1 tablet (81 mg total) by mouth once daily.  0    atenoloL (TENORMIN) 25 MG tablet Take 25 mg by mouth once daily.      biotin 5,000 mcg TbDL Take 5,000 mcg by mouth once daily.       enalapril (VASOTEC) 5 MG tablet Take 5 mg by mouth 2 (two) times daily.       glucosamine &chondroit-mv-min3 543-586-30-0.5 mg Tab Take 1 tablet by mouth once daily.        PRAVACHOL 20 mg tablet Take 20 mg by mouth every evening.       tamsulosin (FLOMAX) 0.4 mg Cap Take 1 capsule (0.4 mg total) by mouth once daily. 30 capsule 11    saw palmetto 80 MG capsule Take 80 mg by mouth Daily. No Sig Provided       No current facility-administered medications for this visit.        Family History:   Family History   Problem Relation Age of Onset    Hypertension Mother     Diabetes Mother     No Known Problems Father        Social History:   Social History     Socioeconomic History    Marital status:      Spouse name: Not on file    Number of children: Not on file    Years of education: Not on file    Highest education level: Not on file   Occupational History    Not on file   Social Needs    Financial resource strain: Not hard at all    Food insecurity     Worry: Never true     Inability: Never true    Transportation needs     Medical: No     Non-medical: No   Tobacco Use    Smoking status: Never Smoker    Smokeless tobacco: Never Used   Substance and Sexual Activity    Alcohol use: No     Frequency: Never     Drinks per session: Patient refused     Binge frequency: Never    Drug use: No    Sexual activity: Not on file   Lifestyle    Physical activity     Days per week: 4 days     Minutes per session: 90 min    Stress: Not at all   Relationships    Social connections     Talks on phone: More than three times a week     Gets together: More than three times a week     Attends [employer] service: Not on file     Active member of club or organization: No      Attends meetings of clubs or organizations: Never     Relationship status:    Other Topics Concern    Not on file   Social History Narrative    Not on file       Review of Systems   Respiratory: Negative for shortness of breath.    Cardiovascular: Negative for chest pain.   Musculoskeletal: Negative for myalgias.   Neurological: Negative for focal weakness.       Objective:     Vitals:    12/30/20 0934   BP: 124/78   Pulse: 65   Resp: 17   Temp: 98.4 °F (36.9 °C)        Physical Exam  Vitals signs and nursing note reviewed.   Constitutional:       Appearance: He is well-developed. He is not diaphoretic.   HENT:      Head: Normocephalic.   Eyes:      Conjunctiva/sclera: Conjunctivae normal.      Pupils: Pupils are equal, round, and reactive to light.   Cardiovascular:      Rate and Rhythm: Normal rate and regular rhythm.      Heart sounds: Normal heart sounds. No murmur. No friction rub. No gallop.    Pulmonary:      Effort: Pulmonary effort is normal. No respiratory distress.      Breath sounds: Normal breath sounds. No stridor. No wheezing, rhonchi or rales.   Chest:      Chest wall: No tenderness.   Musculoskeletal:         General: No swelling, tenderness, deformity or signs of injury.      Right lower leg: No edema.      Left lower leg: No edema.   Skin:     General: Skin is warm and dry.   Psychiatric:         Behavior: Behavior normal.         Thought Content: Thought content normal.         Judgment: Judgment normal.         Assessment:       1. Hyperlipidemia, unspecified hyperlipidemia type    2. Prostate enlargement    3. Essential hypertension        Plan:       Carlos was seen today for hyperlipidemia.    Diagnoses and all orders for this visit:    Hyperlipidemia, unspecified hyperlipidemia type    Prostate enlargement    Essential hypertension         Follow up in about 6 months (around 6/30/2021) for annual, follow up HTN, follow up cholesterol.

## 2021-01-04 ENCOUNTER — OFFICE VISIT (OUTPATIENT)
Dept: CARDIOLOGY | Facility: CLINIC | Age: 66
End: 2021-01-04
Payer: MEDICARE

## 2021-01-04 VITALS
RESPIRATION RATE: 16 BRPM | DIASTOLIC BLOOD PRESSURE: 82 MMHG | WEIGHT: 174 LBS | HEART RATE: 84 BPM | BODY MASS INDEX: 24.36 KG/M2 | OXYGEN SATURATION: 98 % | HEIGHT: 71 IN | SYSTOLIC BLOOD PRESSURE: 138 MMHG

## 2021-01-04 DIAGNOSIS — N52.9 ED (ERECTILE DYSFUNCTION) OF ORGANIC ORIGIN: ICD-10-CM

## 2021-01-04 DIAGNOSIS — I10 ESSENTIAL HYPERTENSION: ICD-10-CM

## 2021-01-04 DIAGNOSIS — I95.2 HYPOTENSION DUE TO MEDICATION: Primary | ICD-10-CM

## 2021-01-04 DIAGNOSIS — E78.5 HYPERLIPIDEMIA, UNSPECIFIED HYPERLIPIDEMIA TYPE: ICD-10-CM

## 2021-01-04 PROCEDURE — 99214 PR OFFICE/OUTPT VISIT, EST, LEVL IV, 30-39 MIN: ICD-10-PCS | Mod: S$GLB,,, | Performed by: INTERNAL MEDICINE

## 2021-01-04 PROCEDURE — 99214 OFFICE O/P EST MOD 30 MIN: CPT | Mod: S$GLB,,, | Performed by: INTERNAL MEDICINE

## 2021-01-04 RX ORDER — ATENOLOL 25 MG/1
12.5 TABLET ORAL DAILY
Qty: 90 TABLET | Refills: 1 | Status: SHIPPED | OUTPATIENT
Start: 2021-01-04 | End: 2021-07-07

## 2021-04-29 ENCOUNTER — PATIENT MESSAGE (OUTPATIENT)
Dept: RESEARCH | Facility: HOSPITAL | Age: 66
End: 2021-04-29

## 2021-07-07 ENCOUNTER — OFFICE VISIT (OUTPATIENT)
Dept: FAMILY MEDICINE | Facility: CLINIC | Age: 66
End: 2021-07-07
Payer: MEDICARE

## 2021-07-07 VITALS
DIASTOLIC BLOOD PRESSURE: 80 MMHG | TEMPERATURE: 98 F | BODY MASS INDEX: 25.34 KG/M2 | RESPIRATION RATE: 18 BRPM | OXYGEN SATURATION: 98 % | HEART RATE: 88 BPM | WEIGHT: 181 LBS | SYSTOLIC BLOOD PRESSURE: 116 MMHG | HEIGHT: 71 IN

## 2021-07-07 DIAGNOSIS — I10 ESSENTIAL HYPERTENSION: ICD-10-CM

## 2021-07-07 DIAGNOSIS — E78.5 HYPERLIPIDEMIA, UNSPECIFIED HYPERLIPIDEMIA TYPE: Primary | ICD-10-CM

## 2021-07-07 DIAGNOSIS — N52.1 ERECTILE DYSFUNCTION DUE TO DISEASES CLASSIFIED ELSEWHERE: ICD-10-CM

## 2021-07-07 PROCEDURE — 99214 OFFICE O/P EST MOD 30 MIN: CPT | Mod: S$PBB,,, | Performed by: FAMILY MEDICINE

## 2021-07-07 PROCEDURE — 99214 OFFICE O/P EST MOD 30 MIN: CPT | Performed by: FAMILY MEDICINE

## 2021-07-07 PROCEDURE — 99214 PR OFFICE/OUTPT VISIT, EST, LEVL IV, 30-39 MIN: ICD-10-PCS | Mod: S$PBB,,, | Performed by: FAMILY MEDICINE

## 2021-07-07 RX ORDER — SILDENAFIL 100 MG/1
100 TABLET, FILM COATED ORAL DAILY PRN
Qty: 20 TABLET | Refills: 5 | Status: SHIPPED | OUTPATIENT
Start: 2021-07-07 | End: 2021-09-17

## 2021-07-09 ENCOUNTER — LAB VISIT (OUTPATIENT)
Dept: LAB | Facility: HOSPITAL | Age: 66
End: 2021-07-09
Attending: FAMILY MEDICINE
Payer: MEDICARE

## 2021-07-09 DIAGNOSIS — I10 ESSENTIAL HYPERTENSION: ICD-10-CM

## 2021-07-09 DIAGNOSIS — E78.5 HYPERLIPIDEMIA, UNSPECIFIED HYPERLIPIDEMIA TYPE: ICD-10-CM

## 2021-07-09 DIAGNOSIS — N52.1 ERECTILE DYSFUNCTION ASSOCIATED WITH TYPE 2 DIABETES MELLITUS: Primary | ICD-10-CM

## 2021-07-09 DIAGNOSIS — E11.69 ERECTILE DYSFUNCTION ASSOCIATED WITH TYPE 2 DIABETES MELLITUS: Primary | ICD-10-CM

## 2021-07-09 LAB
ALBUMIN SERPL BCP-MCNC: 4 G/DL (ref 3.5–5.2)
ALP SERPL-CCNC: 63 U/L (ref 55–135)
ALT SERPL W/O P-5'-P-CCNC: 35 U/L (ref 10–44)
ANION GAP SERPL CALC-SCNC: 9 MMOL/L (ref 8–16)
AST SERPL-CCNC: 28 U/L (ref 10–40)
BILIRUB SERPL-MCNC: 1 MG/DL (ref 0.1–1)
BUN SERPL-MCNC: 18 MG/DL (ref 8–23)
CALCIUM SERPL-MCNC: 9.1 MG/DL (ref 8.7–10.5)
CHLORIDE SERPL-SCNC: 105 MMOL/L (ref 95–110)
CHOLEST SERPL-MCNC: 171 MG/DL (ref 120–199)
CHOLEST/HDLC SERPL: 3.3 {RATIO} (ref 2–5)
CO2 SERPL-SCNC: 24 MMOL/L (ref 23–29)
CREAT SERPL-MCNC: 0.9 MG/DL (ref 0.5–1.4)
EST. GFR  (AFRICAN AMERICAN): >60 ML/MIN/1.73 M^2
EST. GFR  (NON AFRICAN AMERICAN): >60 ML/MIN/1.73 M^2
GLUCOSE SERPL-MCNC: 93 MG/DL (ref 70–110)
HDLC SERPL-MCNC: 52 MG/DL (ref 40–75)
HDLC SERPL: 30.4 % (ref 20–50)
LDLC SERPL CALC-MCNC: 97.8 MG/DL (ref 63–159)
NONHDLC SERPL-MCNC: 119 MG/DL
POTASSIUM SERPL-SCNC: 4.5 MMOL/L (ref 3.5–5.1)
PROT SERPL-MCNC: 7.3 G/DL (ref 6–8.4)
SODIUM SERPL-SCNC: 138 MMOL/L (ref 136–145)
TRIGL SERPL-MCNC: 106 MG/DL (ref 30–150)

## 2021-07-09 PROCEDURE — 80053 COMPREHEN METABOLIC PANEL: CPT | Performed by: FAMILY MEDICINE

## 2021-07-09 PROCEDURE — 36415 COLL VENOUS BLD VENIPUNCTURE: CPT | Performed by: FAMILY MEDICINE

## 2021-07-09 PROCEDURE — 84403 ASSAY OF TOTAL TESTOSTERONE: CPT | Performed by: FAMILY MEDICINE

## 2021-07-09 PROCEDURE — 80061 LIPID PANEL: CPT | Performed by: FAMILY MEDICINE

## 2021-07-10 LAB — TESTOST SERPL-MCNC: 468 NG/DL (ref 264–916)

## 2021-08-17 ENCOUNTER — PATIENT MESSAGE (OUTPATIENT)
Dept: FAMILY MEDICINE | Facility: CLINIC | Age: 66
End: 2021-08-17

## 2021-08-17 RX ORDER — TAMSULOSIN HYDROCHLORIDE 0.4 MG/1
0.4 CAPSULE ORAL DAILY
Qty: 30 CAPSULE | Refills: 11 | Status: SHIPPED | OUTPATIENT
Start: 2021-08-17 | End: 2022-01-07

## 2021-09-03 ENCOUNTER — PATIENT MESSAGE (OUTPATIENT)
Dept: CARDIOLOGY | Facility: CLINIC | Age: 66
End: 2021-09-03

## 2021-09-07 ENCOUNTER — PATIENT MESSAGE (OUTPATIENT)
Dept: CARDIOLOGY | Facility: CLINIC | Age: 66
End: 2021-09-07

## 2021-09-07 RX ORDER — PRAVASTATIN SODIUM 20 MG/1
20 TABLET ORAL NIGHTLY
Qty: 90 TABLET | Refills: 3 | Status: SHIPPED | OUTPATIENT
Start: 2021-09-07 | End: 2022-05-23

## 2021-09-07 RX ORDER — ENALAPRIL MALEATE 5 MG/1
5 TABLET ORAL 2 TIMES DAILY
Qty: 90 TABLET | Refills: 3 | Status: SHIPPED | OUTPATIENT
Start: 2021-09-07 | End: 2022-06-03

## 2021-09-07 RX ORDER — ENALAPRIL MALEATE 5 MG/1
5 TABLET ORAL 2 TIMES DAILY
Qty: 90 TABLET | Refills: 1 | Status: SHIPPED | OUTPATIENT
Start: 2021-09-07 | End: 2021-12-07

## 2021-09-07 RX ORDER — PRAVASTATIN SODIUM 20 MG/1
20 TABLET ORAL NIGHTLY
Qty: 90 TABLET | Refills: 1 | Status: SHIPPED | OUTPATIENT
Start: 2021-09-07 | End: 2021-10-25 | Stop reason: SDUPTHER

## 2021-09-17 ENCOUNTER — OFFICE VISIT (OUTPATIENT)
Dept: CARDIOLOGY | Facility: CLINIC | Age: 66
End: 2021-09-17
Payer: MEDICARE

## 2021-09-17 VITALS
HEART RATE: 52 BPM | WEIGHT: 182 LBS | DIASTOLIC BLOOD PRESSURE: 80 MMHG | HEIGHT: 71 IN | RESPIRATION RATE: 16 BRPM | SYSTOLIC BLOOD PRESSURE: 130 MMHG | OXYGEN SATURATION: 97 % | BODY MASS INDEX: 25.48 KG/M2

## 2021-09-17 DIAGNOSIS — E78.5 HYPERLIPIDEMIA, UNSPECIFIED HYPERLIPIDEMIA TYPE: ICD-10-CM

## 2021-09-17 DIAGNOSIS — I10 ESSENTIAL HYPERTENSION: Primary | ICD-10-CM

## 2021-09-17 DIAGNOSIS — M1A.00X0 IDIOPATHIC CHRONIC GOUT WITHOUT TOPHUS, UNSPECIFIED SITE: ICD-10-CM

## 2021-09-17 PROCEDURE — 99213 PR OFFICE/OUTPT VISIT, EST, LEVL III, 20-29 MIN: ICD-10-PCS | Mod: S$GLB,,, | Performed by: NURSE PRACTITIONER

## 2021-09-17 PROCEDURE — 99213 OFFICE O/P EST LOW 20 MIN: CPT | Mod: S$GLB,,, | Performed by: NURSE PRACTITIONER

## 2021-10-25 ENCOUNTER — PATIENT MESSAGE (OUTPATIENT)
Dept: FAMILY MEDICINE | Facility: CLINIC | Age: 66
End: 2021-10-25
Payer: MEDICARE

## 2021-10-25 DIAGNOSIS — E78.5 HYPERLIPIDEMIA, UNSPECIFIED HYPERLIPIDEMIA TYPE: Primary | ICD-10-CM

## 2021-10-25 DIAGNOSIS — E78.2 MIXED HYPERLIPIDEMIA: ICD-10-CM

## 2021-10-25 RX ORDER — PRAVASTATIN SODIUM 20 MG/1
20 TABLET ORAL NIGHTLY
Qty: 90 TABLET | Refills: 1 | Status: SHIPPED | OUTPATIENT
Start: 2021-10-25 | End: 2022-01-07 | Stop reason: SDUPTHER

## 2021-12-03 ENCOUNTER — PATIENT MESSAGE (OUTPATIENT)
Dept: FAMILY MEDICINE | Facility: CLINIC | Age: 66
End: 2021-12-03
Payer: MEDICARE

## 2022-01-01 ENCOUNTER — PATIENT MESSAGE (OUTPATIENT)
Dept: FAMILY MEDICINE | Facility: CLINIC | Age: 67
End: 2022-01-01
Payer: MEDICARE

## 2022-01-01 DIAGNOSIS — Z12.5 ENCOUNTER FOR PROSTATE CANCER SCREENING: ICD-10-CM

## 2022-01-01 DIAGNOSIS — N40.0 PROSTATE ENLARGEMENT: ICD-10-CM

## 2022-01-01 DIAGNOSIS — I10 ESSENTIAL HYPERTENSION: ICD-10-CM

## 2022-01-01 DIAGNOSIS — E78.2 MIXED HYPERLIPIDEMIA: Primary | ICD-10-CM

## 2022-01-01 DIAGNOSIS — N20.0 KIDNEY STONE: ICD-10-CM

## 2022-01-03 NOTE — TELEPHONE ENCOUNTER
Yes I will put in the orders.  We also need to check her vitamin-D because of the history of kidney stones.

## 2022-01-07 ENCOUNTER — OFFICE VISIT (OUTPATIENT)
Dept: FAMILY MEDICINE | Facility: CLINIC | Age: 67
End: 2022-01-07
Payer: MEDICARE

## 2022-01-07 VITALS
RESPIRATION RATE: 16 BRPM | SYSTOLIC BLOOD PRESSURE: 120 MMHG | DIASTOLIC BLOOD PRESSURE: 74 MMHG | TEMPERATURE: 98 F | BODY MASS INDEX: 25.43 KG/M2 | OXYGEN SATURATION: 96 % | WEIGHT: 182.31 LBS | HEART RATE: 83 BPM

## 2022-01-07 DIAGNOSIS — Z12.5 PROSTATE CANCER SCREENING: ICD-10-CM

## 2022-01-07 DIAGNOSIS — E78.2 MIXED HYPERLIPIDEMIA: ICD-10-CM

## 2022-01-07 DIAGNOSIS — I10 ESSENTIAL HYPERTENSION: Primary | ICD-10-CM

## 2022-01-07 DIAGNOSIS — M79.89 MASS OF SOFT TISSUE OF LEFT LOWER EXTREMITY: ICD-10-CM

## 2022-01-07 DIAGNOSIS — I10 PRIMARY HYPERTENSION: ICD-10-CM

## 2022-01-07 PROCEDURE — 99213 OFFICE O/P EST LOW 20 MIN: CPT | Performed by: FAMILY MEDICINE

## 2022-01-07 PROCEDURE — 99213 OFFICE O/P EST LOW 20 MIN: CPT | Mod: S$PBB,,, | Performed by: FAMILY MEDICINE

## 2022-01-07 PROCEDURE — 99213 PR OFFICE/OUTPT VISIT, EST, LEVL III, 20-29 MIN: ICD-10-PCS | Mod: S$PBB,,, | Performed by: FAMILY MEDICINE

## 2022-01-07 NOTE — PROGRESS NOTES
Subjective:       Patient ID: Carlos Saravia is a 66 y.o. male.    Chief Complaint: Hyperlipidemia and Hypertension      Patient is here for follow-up on hypertension and cholesterol.  BP Readings from Last 3 Encounters:  01/07/22 : 120/74  09/17/21 : 130/80  07/07/21 : 116/80  Lab Results       Component                Value               Date                       WBC                      11.18               11/19/2020                 HGB                      14.1                11/19/2020                 HCT                      44.4                11/19/2020                 PLT                      296                 11/19/2020                 CHOL                     171                 07/09/2021                 TRIG                     106                 07/09/2021                 HDL                      52                  07/09/2021                 ALT                      35                  07/09/2021                 AST                      28                  07/09/2021                 NA                       138                 07/09/2021                 K                        4.5                 07/09/2021                 CL                       105                 07/09/2021                 CREATININE               0.9                 07/09/2021                 BUN                      18                  07/09/2021                 CO2                      24                  07/09/2021                 TSH                      1.450               05/27/2020                 PSA                      1.8                 01/03/2020                 INR                      1.1                 11/18/2020                 HGBA1C                   5.5                 12/21/2017                Hyperlipidemia  This is a chronic problem. The problem is controlled. Recent lipid tests were reviewed and are normal. Pertinent negatives include no chest pain, focal sensory loss, focal weakness or leg pain.    Hypertension  Pertinent negatives include no chest pain.       Allergies and Medications:   Review of patient's allergies indicates:  No Known Allergies  Current Outpatient Medications   Medication Sig Dispense Refill    biotin 5,000 mcg TbDL Take 5,000 mcg by mouth once daily.       enalapril (VASOTEC) 5 MG tablet Take 1 tablet (5 mg total) by mouth 2 (two) times daily. 90 tablet 3    glucosamine &chondroit-mv-min3 954-282-58-0.5 mg Tab Take 1 tablet by mouth once daily.      pravastatin (PRAVACHOL) 20 MG tablet Take 1 tablet (20 mg total) by mouth every evening. 90 tablet 3    aspirin 81 MG Chew Take 1 tablet (81 mg total) by mouth once daily.  0    enalapril (VASOTEC) 5 MG tablet Take 1 tablet by mouth twice daily (Patient not taking: Reported on 1/7/2022) 180 tablet 0     No current facility-administered medications for this visit.       Family History:   Family History   Problem Relation Age of Onset    Hypertension Mother     Diabetes Mother     No Known Problems Father     No Known Problems Brother     No Known Problems Brother     No Known Problems Brother     No Known Problems Brother     No Known Problems Brother        Social History:   Social History     Socioeconomic History    Marital status:    Tobacco Use    Smoking status: Never Smoker    Smokeless tobacco: Never Used   Substance and Sexual Activity    Alcohol use: No    Drug use: No     Social Determinants of Health     Financial Resource Strain: Low Risk     Difficulty of Paying Living Expenses: Not hard at all   Food Insecurity: No Food Insecurity    Worried About Running Out of Food in the Last Year: Never true    Ran Out of Food in the Last Year: Never true   Transportation Needs: No Transportation Needs    Lack of Transportation (Medical): No    Lack of Transportation (Non-Medical): No   Physical Activity: Sufficiently Active    Days of Exercise per Week: 4 days    Minutes of Exercise per Session: 60 min    Stress: No Stress Concern Present    Feeling of Stress : Not at all   Social Connections: Unknown    Frequency of Communication with Friends and Family: More than three times a week    Frequency of Social Gatherings with Friends and Family: More than three times a week    Active Member of Clubs or Organizations: Yes    Attends Club or Organization Meetings: More than 4 times per year    Marital Status:    Housing Stability: Unknown    Unable to Pay for Housing in the Last Year: Patient refused    Unstable Housing in the Last Year: Patient refused       Review of Systems   Cardiovascular: Negative for chest pain.   Neurological: Negative for focal weakness.       Objective:     Vitals:    01/07/22 1015   BP: 120/74   Pulse: 83   Resp: 16   Temp: 98.1 °F (36.7 °C)        Physical Exam  Vitals and nursing note reviewed.   Constitutional:       Appearance: He is well-developed and well-nourished. He is not diaphoretic.   HENT:      Head: Normocephalic.   Eyes:      Extraocular Movements: EOM normal.      Conjunctiva/sclera: Conjunctivae normal.      Pupils: Pupils are equal, round, and reactive to light.   Cardiovascular:      Rate and Rhythm: Normal rate and regular rhythm.      Pulses: Intact distal pulses.      Heart sounds: Normal heart sounds. No murmur heard.  No friction rub. No gallop.    Pulmonary:      Effort: Pulmonary effort is normal. No respiratory distress.      Breath sounds: Normal breath sounds. No stridor. No wheezing or rales.   Chest:      Chest wall: No tenderness.   Musculoskeletal:         General: Swelling (The insertion of the hamstring at the knee on the medial aspect of the left leg.) present.   Skin:     General: Skin is warm and dry.   Psychiatric:         Mood and Affect: Mood and affect normal.         Behavior: Behavior normal.         Thought Content: Thought content normal.         Judgment: Judgment normal.         Assessment:       1. Essential hypertension    2.  Mixed hyperlipidemia    3. Prostate cancer screening    4. Mass of soft tissue of left lower extremity    5. Primary hypertension        Plan:       Carlos was seen today for hyperlipidemia and hypertension.    Diagnoses and all orders for this visit:    Essential hypertension  -     Comprehensive Metabolic Panel; Future    Mixed hyperlipidemia  -     Lipid Panel; Future  -     Comprehensive Metabolic Panel; Future    Prostate cancer screening  -     PSA, Screening; Future    Mass of soft tissue of left lower extremity  -     US Extremity Non Vascular Complete Left; Future    Primary hypertension         Follow up in about 3 months (around 4/7/2022) for HRA with Aaron Sissle or [facility].

## 2022-01-12 ENCOUNTER — PATIENT MESSAGE (OUTPATIENT)
Dept: FAMILY MEDICINE | Facility: CLINIC | Age: 67
End: 2022-01-12
Payer: MEDICARE

## 2022-01-12 ENCOUNTER — HOSPITAL ENCOUNTER (OUTPATIENT)
Dept: RADIOLOGY | Facility: HOSPITAL | Age: 67
Discharge: HOME | End: 2022-01-12
Attending: FAMILY MEDICINE
Payer: MEDICARE

## 2022-01-12 DIAGNOSIS — M79.89 MASS OF SOFT TISSUE OF LEFT LOWER EXTREMITY: ICD-10-CM

## 2022-01-12 DIAGNOSIS — M79.89 MASS OF SOFT TISSUE OF LEFT LOWER EXTREMITY: Primary | ICD-10-CM

## 2022-01-12 PROCEDURE — 76881 US COMPL JOINT R-T W/IMG: CPT | Mod: TC,PO,LT

## 2022-01-12 NOTE — TELEPHONE ENCOUNTER
Patient has a 3-4 cm cyst on the medial aspect of the knee in the area of the previously palpated mass will send to Orthopedics for evaluation

## 2022-07-08 ENCOUNTER — PATIENT MESSAGE (OUTPATIENT)
Dept: FAMILY MEDICINE | Facility: CLINIC | Age: 67
End: 2022-07-08

## 2022-07-08 ENCOUNTER — TELEPHONE (OUTPATIENT)
Dept: FAMILY MEDICINE | Facility: CLINIC | Age: 67
End: 2022-07-08

## 2022-07-08 ENCOUNTER — LAB VISIT (OUTPATIENT)
Dept: LAB | Facility: HOSPITAL | Age: 67
End: 2022-07-08
Attending: FAMILY MEDICINE
Payer: MEDICARE

## 2022-07-08 DIAGNOSIS — E78.2 MIXED HYPERLIPIDEMIA: ICD-10-CM

## 2022-07-08 DIAGNOSIS — Z12.5 PROSTATE CANCER SCREENING: ICD-10-CM

## 2022-07-08 DIAGNOSIS — I10 ESSENTIAL HYPERTENSION: ICD-10-CM

## 2022-07-08 LAB
ALBUMIN SERPL BCP-MCNC: 4.1 G/DL (ref 3.5–5.2)
ALP SERPL-CCNC: 53 U/L (ref 55–135)
ALT SERPL W/O P-5'-P-CCNC: 24 U/L (ref 10–44)
ANION GAP SERPL CALC-SCNC: 3 MMOL/L (ref 8–16)
AST SERPL-CCNC: 27 U/L (ref 10–40)
BILIRUB SERPL-MCNC: 0.9 MG/DL (ref 0.1–1)
BUN SERPL-MCNC: 20 MG/DL (ref 8–23)
CALCIUM SERPL-MCNC: 8.8 MG/DL (ref 8.7–10.5)
CHLORIDE SERPL-SCNC: 106 MMOL/L (ref 95–110)
CHOLEST SERPL-MCNC: 251 MG/DL (ref 120–199)
CHOLEST/HDLC SERPL: 5.1 {RATIO} (ref 2–5)
CO2 SERPL-SCNC: 26 MMOL/L (ref 23–29)
COMPLEXED PSA SERPL-MCNC: 2.2 NG/ML (ref 0–4)
CREAT SERPL-MCNC: 1.1 MG/DL (ref 0.5–1.4)
EST. GFR  (AFRICAN AMERICAN): >60 ML/MIN/1.73 M^2
EST. GFR  (NON AFRICAN AMERICAN): >60 ML/MIN/1.73 M^2
GLUCOSE SERPL-MCNC: 93 MG/DL (ref 70–110)
HDLC SERPL-MCNC: 49 MG/DL (ref 40–75)
HDLC SERPL: 19.5 % (ref 20–50)
LDLC SERPL CALC-MCNC: 180.2 MG/DL (ref 63–159)
NONHDLC SERPL-MCNC: 202 MG/DL
POTASSIUM SERPL-SCNC: 3.8 MMOL/L (ref 3.5–5.1)
PROT SERPL-MCNC: 6.9 G/DL (ref 6–8.4)
SODIUM SERPL-SCNC: 135 MMOL/L (ref 136–145)
TRIGL SERPL-MCNC: 109 MG/DL (ref 30–150)

## 2022-07-08 PROCEDURE — 80061 LIPID PANEL: CPT | Performed by: FAMILY MEDICINE

## 2022-07-08 PROCEDURE — 36415 COLL VENOUS BLD VENIPUNCTURE: CPT | Performed by: FAMILY MEDICINE

## 2022-07-08 PROCEDURE — 84153 ASSAY OF PSA TOTAL: CPT | Performed by: FAMILY MEDICINE

## 2022-07-08 PROCEDURE — 80053 COMPREHEN METABOLIC PANEL: CPT | Performed by: FAMILY MEDICINE

## 2022-07-08 NOTE — PROGRESS NOTES
Cholesterol has jumped way way up compared to 1 year ago.  Have you stop taking the pravastatin?  If so let us resume if not will need to increase the dose.

## 2022-07-08 NOTE — TELEPHONE ENCOUNTER
Patient states   I was having a few side affects from the pravastatin and it seems to be having issues with certain parts of my body  Stomach issues. Urine a dark color   Had trouble sleeping. More frequent urinating at night   Please advise.

## 2022-07-12 ENCOUNTER — OFFICE VISIT (OUTPATIENT)
Dept: FAMILY MEDICINE | Facility: CLINIC | Age: 67
End: 2022-07-12
Payer: COMMERCIAL

## 2022-07-12 VITALS
RESPIRATION RATE: 18 BRPM | SYSTOLIC BLOOD PRESSURE: 136 MMHG | WEIGHT: 185 LBS | HEIGHT: 71 IN | DIASTOLIC BLOOD PRESSURE: 82 MMHG | BODY MASS INDEX: 25.9 KG/M2 | HEART RATE: 66 BPM | OXYGEN SATURATION: 99 %

## 2022-07-12 DIAGNOSIS — E78.2 MIXED HYPERLIPIDEMIA: Primary | ICD-10-CM

## 2022-07-12 DIAGNOSIS — R73.01 IMPAIRED FASTING BLOOD SUGAR: ICD-10-CM

## 2022-07-12 PROCEDURE — 99214 PR OFFICE/OUTPT VISIT, EST, LEVL IV, 30-39 MIN: ICD-10-PCS | Mod: S$PBB,AQ,, | Performed by: FAMILY MEDICINE

## 2022-07-12 PROCEDURE — 99214 OFFICE O/P EST MOD 30 MIN: CPT | Mod: S$PBB,AQ,, | Performed by: FAMILY MEDICINE

## 2022-07-12 RX ORDER — CHOLESTYRAMINE 4 G/9G
4 POWDER, FOR SUSPENSION ORAL
Qty: 270 PACKET | Refills: 3 | Status: SHIPPED | OUTPATIENT
Start: 2022-07-12 | End: 2023-03-07

## 2022-07-12 NOTE — PROGRESS NOTES
Subjective:       Patient ID: Carlos Saravia is a 66 y.o. male.    Chief Complaint: Hyperlipidemia      Is here for follow-up on cholesterol and blood work.  He is due for an annual exam which was not scheduled.  Lab Results       Component                Value               Date                       WBC                      11.18               11/19/2020                 HGB                      14.1                11/19/2020                 HCT                      44.4                11/19/2020                 PLT                      296                 11/19/2020                 CHOL                     251 (H)             07/08/2022  - stopped pravastatin had says insomnia muscle aches diarrhea etc.  had side effects for 3 months.  Stopped at 1 month before labs             TRIG                     109                 07/08/2022                 HDL                      49                  07/08/2022                 ALT                      24                  07/08/2022                 AST                      27                  07/08/2022                 NA                       135 (L)             07/08/2022                 K                        3.8                 07/08/2022                 CL                       106                 07/08/2022                 CREATININE               1.1                 07/08/2022                 BUN                      20                  07/08/2022                 CO2                      26                  07/08/2022                 TSH                      1.450               05/27/2020                 PSA                      2.2                 07/08/2022                 INR                      1.1                 11/18/2020                 HGBA1C                   5.5                 12/21/2017            Patient had an angiogram 2 years ago that was completely clean.          Hyperlipidemia  This is a chronic problem. The problem is uncontrolled. Recent lipid  tests were reviewed and are high.       Allergies and Medications:   Review of patient's allergies indicates:  No Known Allergies  Current Outpatient Medications   Medication Sig Dispense Refill    aspirin 81 MG Chew Take 1 tablet (81 mg total) by mouth once daily.  0    biotin 5,000 mcg TbDL Take 5,000 mcg by mouth once daily.       enalapril (VASOTEC) 5 MG tablet Take 1 tablet by mouth twice daily 180 tablet 0    glucosamine &chondroit-mv-min3 532-112-21-0.5 mg Tab Take 1 tablet by mouth once daily.      cholestyramine (QUESTRAN) 4 gram packet Take 1 packet (4 g total) by mouth 3 (three) times daily with meals. 270 packet 3    enalapril (VASOTEC) 5 MG tablet Take 1 tablet by mouth twice daily (Patient not taking: No sig reported) 180 tablet 0     No current facility-administered medications for this visit.       Family History:   Family History   Problem Relation Age of Onset    Hypertension Mother     Diabetes Mother     No Known Problems Father     No Known Problems Brother     No Known Problems Brother     No Known Problems Brother     No Known Problems Brother     No Known Problems Brother        Social History:   Social History     Socioeconomic History    Marital status:    Tobacco Use    Smoking status: Never Smoker    Smokeless tobacco: Never Used   Substance and Sexual Activity    Alcohol use: No    Drug use: No     Social Determinants of Health     Financial Resource Strain: Low Risk     Difficulty of Paying Living Expenses: Not hard at all   Food Insecurity: No Food Insecurity    Worried About Running Out of Food in the Last Year: Never true    Ran Out of Food in the Last Year: Never true   Transportation Needs: No Transportation Needs    Lack of Transportation (Medical): No    Lack of Transportation (Non-Medical): No   Physical Activity: Sufficiently Active    Days of Exercise per Week: 3 days    Minutes of Exercise per Session: 60 min   Stress: No Stress Concern  Present    Feeling of Stress : Not at all   Social Connections: Unknown    Frequency of Communication with Friends and Family: More than three times a week    Frequency of Social Gatherings with Friends and Family: More than three times a week    Active Member of Clubs or Organizations: Yes    Attends Club or Organization Meetings: More than 4 times per year    Marital Status:    Housing Stability: Low Risk     Unable to Pay for Housing in the Last Year: No    Number of Places Lived in the Last Year: 1    Unstable Housing in the Last Year: No       Review of Systems    Objective:     Vitals:    07/12/22 0907   BP: 136/82   Pulse: 66   Resp: 18        Physical Exam  Vitals and nursing note reviewed.   Constitutional:       Appearance: He is well-developed. He is not diaphoretic.   HENT:      Head: Normocephalic.   Eyes:      Conjunctiva/sclera: Conjunctivae normal.      Pupils: Pupils are equal, round, and reactive to light.   Cardiovascular:      Rate and Rhythm: Normal rate and regular rhythm.      Heart sounds: Normal heart sounds. No murmur heard.    No friction rub. No gallop.   Pulmonary:      Effort: Pulmonary effort is normal. No respiratory distress.      Breath sounds: Normal breath sounds. No stridor. No wheezing, rhonchi or rales.   Chest:      Chest wall: No tenderness.   Musculoskeletal:      Right lower leg: No edema.      Left lower leg: No edema.   Skin:     General: Skin is warm and dry.   Psychiatric:         Behavior: Behavior normal.         Thought Content: Thought content normal.         Judgment: Judgment normal.       rash on left flank persists  Assessment:       1. Mixed hyperlipidemia    2. Impaired fasting blood sugar        Plan:       Carlos was seen today for hyperlipidemia.    Diagnoses and all orders for this visit:    Mixed hyperlipidemia  -     cholestyramine (QUESTRAN) 4 gram packet; Take 1 packet (4 g total) by mouth 3 (three) times daily with meals.    Impaired  fasting blood sugar  -     Hemoglobin A1C; Future         Follow up in about 3 months (around 10/12/2022) for HRA with Aaron Sissle or [facility].

## 2022-07-13 ENCOUNTER — PATIENT MESSAGE (OUTPATIENT)
Dept: FAMILY MEDICINE | Facility: CLINIC | Age: 67
End: 2022-07-13

## 2022-07-21 ENCOUNTER — LAB VISIT (OUTPATIENT)
Dept: LAB | Facility: HOSPITAL | Age: 67
End: 2022-07-21
Attending: FAMILY MEDICINE
Payer: MEDICARE

## 2022-07-21 ENCOUNTER — TELEPHONE (OUTPATIENT)
Dept: FAMILY MEDICINE | Facility: CLINIC | Age: 67
End: 2022-07-21

## 2022-07-21 ENCOUNTER — PATIENT MESSAGE (OUTPATIENT)
Dept: FAMILY MEDICINE | Facility: CLINIC | Age: 67
End: 2022-07-21

## 2022-07-21 DIAGNOSIS — R73.01 IMPAIRED FASTING BLOOD SUGAR: ICD-10-CM

## 2022-07-21 LAB
ESTIMATED AVG GLUCOSE: 108 MG/DL (ref 68–131)
HBA1C MFR BLD: 5.4 % (ref 4.5–6.2)

## 2022-07-21 PROCEDURE — 83036 HEMOGLOBIN GLYCOSYLATED A1C: CPT | Performed by: FAMILY MEDICINE

## 2022-07-21 PROCEDURE — 36415 COLL VENOUS BLD VENIPUNCTURE: CPT | Performed by: FAMILY MEDICINE

## 2022-07-21 NOTE — TELEPHONE ENCOUNTER
----- Message from Dick Mendez MD sent at 7/8/2022  7:56 AM CDT -----  Cholesterol has jumped way way up compared to 1 year ago.  Have you stop taking the pravastatin?  If so let us resume if not will need to increase the dose.

## 2022-07-22 ENCOUNTER — TELEPHONE (OUTPATIENT)
Dept: FAMILY MEDICINE | Facility: CLINIC | Age: 67
End: 2022-07-22

## 2022-07-22 NOTE — TELEPHONE ENCOUNTER
----- Message from Dick Mendez MD sent at 7/21/2022  1:11 PM CDT -----  Results Ok, notify patient.

## 2022-08-01 ENCOUNTER — TELEPHONE (OUTPATIENT)
Dept: FAMILY MEDICINE | Facility: CLINIC | Age: 67
End: 2022-08-01

## 2022-08-01 ENCOUNTER — OFFICE VISIT (OUTPATIENT)
Dept: FAMILY MEDICINE | Facility: CLINIC | Age: 67
End: 2022-08-01
Payer: MEDICARE

## 2022-08-01 ENCOUNTER — HOSPITAL ENCOUNTER (OUTPATIENT)
Dept: RADIOLOGY | Facility: HOSPITAL | Age: 67
Discharge: HOME | End: 2022-08-01
Attending: FAMILY MEDICINE
Payer: MEDICARE

## 2022-08-01 VITALS
SYSTOLIC BLOOD PRESSURE: 102 MMHG | RESPIRATION RATE: 18 BRPM | DIASTOLIC BLOOD PRESSURE: 86 MMHG | BODY MASS INDEX: 25.26 KG/M2 | WEIGHT: 181.13 LBS | OXYGEN SATURATION: 98 % | TEMPERATURE: 98 F | HEART RATE: 60 BPM

## 2022-08-01 DIAGNOSIS — M25.562 ACUTE PAIN OF LEFT KNEE: Primary | ICD-10-CM

## 2022-08-01 DIAGNOSIS — M25.562 ACUTE PAIN OF LEFT KNEE: ICD-10-CM

## 2022-08-01 PROCEDURE — 99213 OFFICE O/P EST LOW 20 MIN: CPT | Performed by: FAMILY MEDICINE

## 2022-08-01 PROCEDURE — 99213 OFFICE O/P EST LOW 20 MIN: CPT | Mod: S$PBB,,, | Performed by: FAMILY MEDICINE

## 2022-08-01 PROCEDURE — 99213 PR OFFICE/OUTPT VISIT, EST, LEVL III, 20-29 MIN: ICD-10-PCS | Mod: S$PBB,,, | Performed by: FAMILY MEDICINE

## 2022-08-01 PROCEDURE — 73564 X-RAY EXAM KNEE 4 OR MORE: CPT | Mod: TC,LT

## 2022-08-01 RX ORDER — MELOXICAM 7.5 MG/1
7.5 TABLET ORAL DAILY
Qty: 30 TABLET | Refills: 5 | Status: SHIPPED | OUTPATIENT
Start: 2022-08-01 | End: 2023-01-28

## 2022-08-01 RX ORDER — POTASSIUM &MAGNESIUM ASPARTATE 250-250 MG
CAPSULE ORAL
COMMUNITY

## 2022-08-01 RX ORDER — IBUPROFEN 100 MG/5ML
SUSPENSION, ORAL (FINAL DOSE FORM) ORAL DAILY
COMMUNITY

## 2022-08-01 RX ORDER — SAW PALMETTO 160 MG
160 CAPSULE ORAL 2 TIMES DAILY
COMMUNITY

## 2022-08-01 NOTE — PROGRESS NOTES
Subjective:       Patient ID: Carlos Saravia is a 66 y.o. male.    Chief Complaint: Knee Pain (About a week ago, swollen)      Patient is here because of left knee pain he started jogging several weeks ago and after 3 days started having swelling and pain in left knee.  There was precipitated by jogging but there was no related torsion injury.      Knee Pain   The incident occurred more than 1 week ago. Injury mechanism: running. The pain is present in the left knee. The quality of the pain is described as aching and stabbing. The pain is at a severity of 5/10. The pain is moderate. The pain has been fluctuating since onset. Associated symptoms include an inability to bear weight. Foreign body present: Baker's cyst left knee discovered several months ago.       Allergies and Medications:   Review of patient's allergies indicates:  No Known Allergies  Current Outpatient Medications   Medication Sig Dispense Refill    ascorbic acid, vitamin C, (VITAMIN C) 1000 MG tablet Take by mouth once daily.      aspirin 81 MG Chew Take 1 tablet (81 mg total) by mouth once daily.  0    biotin 5,000 mcg TbDL Take 5,000 mcg by mouth once daily.       cholestyramine (QUESTRAN) 4 gram packet Take 1 packet (4 g total) by mouth 3 (three) times daily with meals. 270 packet 3    cranberry 500 mg Cap Take by mouth.      enalapril (VASOTEC) 5 MG tablet Take 1 tablet by mouth twice daily 180 tablet 0    glucosamine &chondroit-mv-min3 121-988-96-0.5 mg Tab Take 1 tablet by mouth once daily.      saw palmetto 160 MG capsule Take 160 mg by mouth 2 (two) times daily.      enalapril (VASOTEC) 5 MG tablet Take 1 tablet by mouth twice daily (Patient not taking: Reported on 8/1/2022) 180 tablet 0    meloxicam (MOBIC) 7.5 MG tablet Take 1 tablet (7.5 mg total) by mouth once daily. 30 tablet 5     No current facility-administered medications for this visit.       Family History:   Family History   Problem Relation Age of Onset    Hypertension  Mother     Diabetes Mother     No Known Problems Father     No Known Problems Brother     No Known Problems Brother     No Known Problems Brother     No Known Problems Brother     No Known Problems Brother        Social History:   Social History     Socioeconomic History    Marital status:    Tobacco Use    Smoking status: Never Smoker    Smokeless tobacco: Never Used   Substance and Sexual Activity    Alcohol use: No    Drug use: No     Social Determinants of Health     Financial Resource Strain: Low Risk     Difficulty of Paying Living Expenses: Not hard at all   Food Insecurity: No Food Insecurity    Worried About Running Out of Food in the Last Year: Never true    Ran Out of Food in the Last Year: Never true   Transportation Needs: No Transportation Needs    Lack of Transportation (Medical): No    Lack of Transportation (Non-Medical): No   Physical Activity: Sufficiently Active    Days of Exercise per Week: 3 days    Minutes of Exercise per Session: 60 min   Stress: No Stress Concern Present    Feeling of Stress : Not at all   Social Connections: Unknown    Frequency of Communication with Friends and Family: More than three times a week    Frequency of Social Gatherings with Friends and Family: More than three times a week    Active Member of Clubs or Organizations: Yes    Attends Club or Organization Meetings: More than 4 times per year    Marital Status:    Housing Stability: Low Risk     Unable to Pay for Housing in the Last Year: No    Number of Places Lived in the Last Year: 1    Unstable Housing in the Last Year: No       Review of Systems    Objective:     Vitals:    08/01/22 0912   BP: 102/86   Pulse: 60   Resp: 18   Temp: 98 °F (36.7 °C)        Physical Exam  Musculoskeletal:        Legs:          Assessment:       1. Acute pain of left knee        Plan:       Carlos was seen today for knee pain.    Diagnoses and all orders for this visit:    Acute pain of left  knee  -     Ambulatory referral/consult to Orthopedics; Future  -     X-Ray Knee 3 View Left; Future  -     meloxicam (MOBIC) 7.5 MG tablet; Take 1 tablet (7.5 mg total) by mouth once daily.         Follow up in about 3 months (around 11/1/2022) for Knee pain, follow up.

## 2022-08-01 NOTE — PROGRESS NOTES
X-rays show degenerative changes compatible with arthritis of the knee it does not rule out any ligamentous injury.  Follow-up with orthopedist as recommended.

## 2022-08-01 NOTE — TELEPHONE ENCOUNTER
----- Message from Dick Mendez MD sent at 8/1/2022 11:36 AM CDT -----  X-rays show degenerative changes compatible with arthritis of the knee it does not rule out any ligamentous injury.  Follow-up with orthopedist as recommended.

## 2022-08-09 ENCOUNTER — OFFICE VISIT (OUTPATIENT)
Dept: ORTHOPEDICS | Facility: CLINIC | Age: 67
End: 2022-08-09
Payer: MEDICARE

## 2022-08-09 VITALS — WEIGHT: 180 LBS | BODY MASS INDEX: 25.2 KG/M2 | HEIGHT: 71 IN

## 2022-08-09 DIAGNOSIS — S83.242A ACUTE MEDIAL MENISCUS TEAR OF LEFT KNEE, INITIAL ENCOUNTER: ICD-10-CM

## 2022-08-09 DIAGNOSIS — M17.12 PRIMARY OSTEOARTHRITIS OF LEFT KNEE: ICD-10-CM

## 2022-08-09 PROCEDURE — 20610 DRAIN/INJ JOINT/BURSA W/O US: CPT | Mod: LT,S$GLB,, | Performed by: ORTHOPAEDIC SURGERY

## 2022-08-09 PROCEDURE — 99203 PR OFFICE/OUTPT VISIT, NEW, LEVL III, 30-44 MIN: ICD-10-PCS | Mod: 25,S$GLB,, | Performed by: ORTHOPAEDIC SURGERY

## 2022-08-09 PROCEDURE — 99203 OFFICE O/P NEW LOW 30 MIN: CPT | Mod: 25,S$GLB,, | Performed by: ORTHOPAEDIC SURGERY

## 2022-08-09 PROCEDURE — 20610 LARGE JOINT ASPIRATION/INJECTION: L KNEE: ICD-10-PCS | Mod: LT,S$GLB,, | Performed by: ORTHOPAEDIC SURGERY

## 2022-08-09 RX ORDER — IBUPROFEN 200 MG
200 TABLET ORAL EVERY 6 HOURS PRN
COMMUNITY
End: 2023-03-07

## 2022-08-09 RX ORDER — TRIAMCINOLONE ACETONIDE 40 MG/ML
40 INJECTION, SUSPENSION INTRA-ARTICULAR; INTRAMUSCULAR
Status: DISCONTINUED | OUTPATIENT
Start: 2022-08-09 | End: 2022-08-09 | Stop reason: HOSPADM

## 2022-08-09 RX ORDER — TRAMADOL HYDROCHLORIDE 50 MG/1
50 TABLET ORAL EVERY 6 HOURS PRN
Qty: 28 EACH | Refills: 0 | Status: SHIPPED | OUTPATIENT
Start: 2022-08-09 | End: 2023-03-07

## 2022-08-09 RX ADMIN — TRIAMCINOLONE ACETONIDE 40 MG: 40 INJECTION, SUSPENSION INTRA-ARTICULAR; INTRAMUSCULAR at 07:08

## 2022-08-09 NOTE — PROGRESS NOTES
[facility]    Subjective:     Chief Complaint:   Chief Complaint   Patient presents with    Left Knee - Pain     Left knee x 3 weeks, no accident/injury, c/o pain, pain at medial aspect, swelling, no popping, could give way depending on how long he is on it       Past Medical History:   Diagnosis Date    Diverticulitis 11/18/2020    Gout     Hypertension     Kidney stone        Past Surgical History:   Procedure Laterality Date    adnoidectomy      ANGIOGRAM, CORONARY, WITH LEFT HEART CATHETERIZATION Left 5/27/2020    Procedure: Left heart cath;  Surgeon: Nick Kerr MD;  Location: [facility] CATH/EP LAB;  Service: Cardiology;  Laterality: Left;    APPENDECTOMY      COLON SURGERY      colon rescection benign polyp    tonsillectomy         Current Outpatient Medications   Medication Sig    ascorbic acid, vitamin C, (VITAMIN C) 1000 MG tablet Take by mouth once daily.    aspirin 81 MG Chew Take 1 tablet (81 mg total) by mouth once daily.    biotin 5,000 mcg TbDL Take 5,000 mcg by mouth once daily.     cholestyramine (QUESTRAN) 4 gram packet Take 1 packet (4 g total) by mouth 3 (three) times daily with meals.    cranberry 500 mg Cap Take by mouth.    enalapril (VASOTEC) 5 MG tablet Take 1 tablet by mouth twice daily    enalapril (VASOTEC) 5 MG tablet Take 1 tablet by mouth twice daily    glucosamine &chondroit-mv-min3 772-088-01-0.5 mg Tab Take 1 tablet by mouth once daily.    ibuprofen (ADVIL,MOTRIN) 200 MG tablet Take 200 mg by mouth every 6 (six) hours as needed for Pain.    saw palmetto 160 MG capsule Take 160 mg by mouth 2 (two) times daily.    meloxicam (MOBIC) 7.5 MG tablet Take 1 tablet (7.5 mg total) by mouth once daily. (Patient not taking: Reported on 8/9/2022)     No current facility-administered medications for this visit.       Review of patient's allergies indicates:  No Known Allergies    Family History   Problem Relation Age of Onset    Hypertension Mother      Diabetes Mother     No Known Problems Father     No Known Problems Brother     No Known Problems Brother     No Known Problems Brother     No Known Problems Brother     No Known Problems Brother        Social History     Socioeconomic History    Marital status:    Tobacco Use    Smoking status: Never Smoker    Smokeless tobacco: Never Used   Substance and Sexual Activity    Alcohol use: No    Drug use: No     Social Determinants of Health     Financial Resource Strain: Low Risk     Difficulty of Paying Living Expenses: Not hard at all   Food Insecurity: No Food Insecurity    Worried About Running Out of Food in the Last Year: Never true    Ran Out of Food in the Last Year: Never true   Transportation Needs: No Transportation Needs    Lack of Transportation (Medical): No    Lack of Transportation (Non-Medical): No   Physical Activity: Sufficiently Active    Days of Exercise per Week: 3 days    Minutes of Exercise per Session: 60 min   Stress: No Stress Concern Present    Feeling of Stress : Not at all   Social Connections: Unknown    Frequency of Communication with Friends and Family: More than three times a week    Frequency of Social Gatherings with Friends and Family: More than three times a week    Active Member of Clubs or Organizations: Yes    Attends Club or Organization Meetings: More than 4 times per year    Marital Status:    Housing Stability: Low Risk     Unable to Pay for Housing in the Last Year: No    Number of Places Lived in the Last Year: 1    Unstable Housing in the Last Year: No       History of present illness:  67-year-old male who presents to clinic today for evaluation of complaints of left knee pain.  Acute onset, about 3 weeks ago he was doing some light jogging, heavy walking when he developed an acute onset of medial left knee pain.  There was a reported effusion which has significantly improved.  However persistent pain remains.      Review of  Systems:    Constitution: Negative for chills, fever, and sweats.  Negative for unexplained weight loss.    HENT:  Negative for headaches and blurry vision.    Cardiovascular:Negative for chest pain or irregular heart beat. Negative for hypertension.    Respiratory:  Negative for cough and shortness of breath.    Gastrointestinal: Negative for abdominal pain, heartburn, melena, nausea, and vomitting.    Genitourinary:  Negative bladder incontinence and dysuria.    Musculoskeletal:  See HPI for details.     Neurological: Negative for numbness.    Psychiatric/Behavioral: Negative for depression.  The patient is not nervous/anxious.      Endocrine: Negative for polyuria    Hematologic/Lymphatic: Negative for bleeding problem.  Does not bruise/bleed easily.    Skin: Negative for poor would healing and rash    Objective:      Physical Examination:    Vital Signs:  There were no vitals filed for this visit.    Body mass index is 25.1 kg/m².    This a well-developed, well nourished patient in no acute distress.  They are alert and oriented and cooperative to examination.        Examination of the left knee, there is no significant joint effusion appreciated.  The skin is dry and intact.  No evidence of infection.  He has significant patellofemoral crepitus, some mild discomfort with grind testing.  Tenderness over the medial joint line.  Stable to anterior-posterior varus and valgus stress, range of motion 0-130 degrees.    Pertinent New Results:    XRAY Report / Interpretation:   A standing AP view of the bilateral knees taken today in the office demonstrates some mild joint space collapse and squaring of the medial compartments bilaterally.    Multiple images of the left knee taken at an outside facility demonstrate patellofemoral joint and medial compartment arthritis primarily.    Assessment/Plan:      67-year-old male with acute left knee pain.  No effusion today.  This occurred while he was doing some physical  "activity.  He does have some underlying arthritis with a potential acute or exacerbated degenerative medial meniscal tear.  We discussed this in great detail.  We injected the left knee today with lidocaine and triamcinolone, anterior lateral approach sterile technique and patient tolerated well.  We will see him back in a month to see how he is doing.  We discussed potential MRI, arthroscopy if not significantly improved.    Jerry Alva, Physician Assistant, served in the capacity as a "scribe" for this patient encounter.  A "face-to-face" encounter occurred with Dr. Keron Asencio on this date.  The treatment plan and medical decision-making is outlined above. Patient was seen and examined with a chaperone.       This note was created using Dragon voice recognition software that occasionally misinterpreted phrases or words.        "

## 2022-08-09 NOTE — PROCEDURES
Large Joint Aspiration/Injection: L knee    Date/Time: 8/9/2022 7:45 AM  Performed by: Keron Asencio MD  Authorized by: Keron Asencio MD     Consent Done?:  Yes (Verbal)  Indications:  Pain  Site marked: the procedure site was marked    Timeout: prior to procedure the correct patient, procedure, and site was verified    Prep: patient was prepped and draped in usual sterile fashion      Local anesthesia used?: Yes    Local anesthetic:  Lidocaine 1% without epinephrine    Details:  Needle Size:  25 G  Ultrasonic Guidance for needle placement?: No    Location:  Knee  Site:  L knee  Medications:  40 mg triamcinolone acetonide 40 mg/mL  Patient tolerance:  Patient tolerated the procedure well with no immediate complications

## 2023-01-10 ENCOUNTER — OFFICE VISIT (OUTPATIENT)
Dept: ORTHOPEDICS | Facility: CLINIC | Age: 68
End: 2023-01-10
Payer: MEDICARE

## 2023-01-10 ENCOUNTER — HOSPITAL ENCOUNTER (OUTPATIENT)
Dept: RADIOLOGY | Facility: HOSPITAL | Age: 68
Discharge: HOME | End: 2023-01-10
Attending: ORTHOPAEDIC SURGERY
Payer: MEDICARE

## 2023-01-10 VITALS — WEIGHT: 180 LBS | BODY MASS INDEX: 25.2 KG/M2 | HEIGHT: 71 IN

## 2023-01-10 DIAGNOSIS — M79.605 LEFT LEG PAIN: Primary | ICD-10-CM

## 2023-01-10 DIAGNOSIS — M75.42 IMPINGEMENT SYNDROME OF LEFT SHOULDER: ICD-10-CM

## 2023-01-10 DIAGNOSIS — M17.12 PRIMARY OSTEOARTHRITIS OF LEFT KNEE: ICD-10-CM

## 2023-01-10 DIAGNOSIS — S83.242D ACUTE MEDIAL MENISCAL TEAR, LEFT, SUBSEQUENT ENCOUNTER: ICD-10-CM

## 2023-01-10 DIAGNOSIS — M79.605 LEFT LEG PAIN: ICD-10-CM

## 2023-01-10 PROCEDURE — 99213 PR OFFICE/OUTPT VISIT, EST, LEVL III, 20-29 MIN: ICD-10-PCS | Mod: 25,S$GLB,, | Performed by: ORTHOPAEDIC SURGERY

## 2023-01-10 PROCEDURE — 99213 OFFICE O/P EST LOW 20 MIN: CPT | Mod: 25,S$GLB,, | Performed by: ORTHOPAEDIC SURGERY

## 2023-01-10 PROCEDURE — 93971 EXTREMITY STUDY: CPT | Mod: TC,LT

## 2023-01-10 PROCEDURE — 20610 DRAIN/INJ JOINT/BURSA W/O US: CPT | Mod: LT,S$GLB,, | Performed by: ORTHOPAEDIC SURGERY

## 2023-01-10 PROCEDURE — 20610 LARGE JOINT ASPIRATION/INJECTION: L SUBACROMIAL BURSA: ICD-10-PCS | Mod: LT,S$GLB,, | Performed by: ORTHOPAEDIC SURGERY

## 2023-01-10 RX ORDER — TRIAMCINOLONE ACETONIDE 40 MG/ML
40 INJECTION, SUSPENSION INTRA-ARTICULAR; INTRAMUSCULAR
Status: DISCONTINUED | OUTPATIENT
Start: 2023-01-10 | End: 2023-01-10 | Stop reason: HOSPADM

## 2023-01-10 RX ADMIN — TRIAMCINOLONE ACETONIDE 40 MG: 40 INJECTION, SUSPENSION INTRA-ARTICULAR; INTRAMUSCULAR at 07:01

## 2023-01-10 NOTE — PROGRESS NOTES
[facility]    Subjective:     Chief Complaint:   Chief Complaint   Patient presents with    Left Shoulder - Pain     Patient is having left shoulder pain, he had a fall in September, with some movements its really painful for him.    Left Knee - Pain     Patient is having left knee pain, his last injection did not help, it painful when he walks at times, he has throbbing in his knee, he is wanting an MRI.       Past Medical History:   Diagnosis Date    Diverticulitis 11/18/2020    Gout     Hypertension     Kidney stone        Past Surgical History:   Procedure Laterality Date    adnoidectomy      ANGIOGRAM, CORONARY, WITH LEFT HEART CATHETERIZATION Left 5/27/2020    Procedure: Left heart cath;  Surgeon: Nick Kerr MD;  Location: [facility] CATH/EP LAB;  Service: Cardiology;  Laterality: Left;    APPENDECTOMY      COLON SURGERY      colon rescection benign polyp    tonsillectomy         Current Outpatient Medications   Medication Sig    ascorbic acid, vitamin C, (VITAMIN C) 1000 MG tablet Take by mouth once daily.    aspirin 81 MG Chew Take 1 tablet (81 mg total) by mouth once daily.    biotin 5,000 mcg TbDL Take 5,000 mcg by mouth once daily.     cranberry 500 mg Cap Take by mouth.    enalapril (VASOTEC) 5 MG tablet Take 1 tablet by mouth twice daily    glucosamine &chondroit-mv-min3 021-845-40-0.5 mg Tab Take 1 tablet by mouth once daily.    saw palmetto 160 MG capsule Take 160 mg by mouth 2 (two) times daily.    cholestyramine (QUESTRAN) 4 gram packet Take 1 packet (4 g total) by mouth 3 (three) times daily with meals. (Patient not taking: Reported on 1/10/2023)    enalapril (VASOTEC) 5 MG tablet Take 1 tablet by mouth twice daily (Patient not taking: Reported on 1/10/2023)    ibuprofen (ADVIL,MOTRIN) 200 MG tablet Take 200 mg by mouth every 6 (six) hours as needed for Pain.    meloxicam (MOBIC) 7.5 MG tablet Take 1 tablet (7.5 mg total) by mouth once daily. (Patient not taking: Reported on  8/9/2022)    traMADoL (ULTRAM) 50 mg tablet Take 1 tablet (50 mg total) by mouth every 6 (six) hours as needed for Pain. (Patient not taking: Reported on 1/10/2023)     No current facility-administered medications for this visit.       Review of patient's allergies indicates:  No Known Allergies    Family History   Problem Relation Age of Onset    Hypertension Mother     Diabetes Mother     No Known Problems Father     No Known Problems Brother     No Known Problems Brother     No Known Problems Brother     No Known Problems Brother     No Known Problems Brother        Social History     Socioeconomic History    Marital status:    Tobacco Use    Smoking status: Never    Smokeless tobacco: Never   Substance and Sexual Activity    Alcohol use: No    Drug use: No     Social Determinants of Health     Financial Resource Strain: Unknown    Difficulty of Paying Living Expenses: Patient refused   Food Insecurity: Unknown    Worried About Running Out of Food in the Last Year: Patient refused    Ran Out of Food in the Last Year: Patient refused   Transportation Needs: Unknown    Lack of Transportation (Medical): Patient refused    Lack of Transportation (Non-Medical): Patient refused   Physical Activity: Unknown    Days of Exercise per Week: Patient refused    Minutes of Exercise per Session: 60 min   Stress: Unknown    Feeling of Stress : Patient refused   Social Connections: Unknown    Frequency of Communication with Friends and Family: Patient refused    Frequency of Social Gatherings with Friends and Family: Patient refused    Active Member of Clubs or Organizations: Patient refused    Attends Club or Organization Meetings: Patient refused    Marital Status:    Housing Stability: Unknown    Unable to Pay for Housing in the Last Year: Patient refused    Number of Places Lived in the Last Year: 1    Unstable Housing in the Last Year: Patient refused       History of present illness:  67-year-old male presents  to clinic today to follow-up on his left knee.  We saw him back in August, he had developed some left knee pain after some bouts of strenuous activity.  Pain was over the medial aspect of the left knee however slightly inferior and anterior to the medial joint line.  In the area of the pes bursa he had some ecchymotic areas.  Appear to be superficial varicosities.  But this is where he is tender.  It aggravates him with jogging or other activity.    He has a new complaint today of left shoulder pain.  He suffered a fall back in September.  Now he has got chronic left shoulder pain, worse at night, keeps him awake.  Can not sleep on his left side.  Difficulty lifting over his head or putting his belt through the belt loops.  He is right-hand dominant.      Review of Systems:    Constitution: Negative for chills, fever, and sweats.  Negative for unexplained weight loss.    HENT:  Negative for headaches and blurry vision.    Cardiovascular:Negative for chest pain or irregular heart beat. Negative for hypertension.    Respiratory:  Negative for cough and shortness of breath.    Gastrointestinal: Negative for abdominal pain, heartburn, melena, nausea, and vomitting.    Genitourinary:  Negative bladder incontinence and dysuria.    Musculoskeletal:  See HPI for details.     Neurological: Negative for numbness.    Psychiatric/Behavioral: Negative for depression.  The patient is not nervous/anxious.      Endocrine: Negative for polyuria    Hematologic/Lymphatic: Negative for bleeding problem.  Does not bruise/bleed easily.    Skin: Negative for poor would healing and rash    Objective:      Physical Examination:    Vital Signs:  There were no vitals filed for this visit.    Body mass index is 25.1 kg/m².    This a well-developed, well nourished patient in no acute distress.  They are alert and oriented and cooperative to examination.        Examination of the left knee, no effusion, skin is dry and intact, no erythema or  ecchymosis, no signs symptoms of infection.  He does have some superficial varicosities noted throughout the left leg.  However the left leg and calf is soft, nontender.  Range of motion 0-120 degrees.  Stable anterior-posterior varus and valgus stress.  Straight leg raise is negative.    Examination of the left shoulder, again the skin is dry and intact no erythema ecchymosis no signs symptoms of infection.  No AC joint crepitus but he is tender.  No pain with Neer's test, he does get a lot of pain with crossover testing, pain with Ruff test.  No significant pain but weakness with Rosalba's test.  Range of motion, actively patient limits his range of motion to only slightly greater than 100°, passively I can get him to about 150°, external rotation about 50° as well.  Internal rotation only to the level of the posterior iliac crest.    Pertinent New Results:    XRAY Report / Interpretation:   AP lateral views of the left shoulder taken today in the office demonstrate type 2 acromion process, some mild AC joint arthrosis.  No significant glenohumeral changes.  Visualized soft tissues appear normal.    Assessment/Plan:      Follow-up of left knee pain, patient continues to complain of left knee pain.  I do not think this is pes bursitis we discussed a degenerative meniscal tear at his last visit.  He managed his symptoms conservatively with ibuprofen or anti-inflammatories as needed.  We will proceed with an MRI of the left knee.  I also ordered an ultrasound on take a look at the vascular structures, he does have some prominent varicosities.  I do not suspect a DVT.    New evaluation of left shoulder pain, impingement syndrome rotator cuff biceps tendinitis.  Injected the left shoulder today will start him on physical therapy for range of motion as he was a little stiff.  I do not suspect a rotator cuff tear he is little weak but he does feel grossly intact.  If he does respond to the shot in therapy however we will  "consider an MRI of the left shoulder.    Follow-up 2 weeks with ultrasound MRI results.    Jerry Alva, Physician Assistant, served in the capacity as a "scribe" for this patient encounter.  A "face-to-face" encounter occurred with Dr. Keron Asencio on this date.  The treatment plan and medical decision-making is outlined above. Patient was seen and examined with a chaperone.       This note was created using Dragon voice recognition software that occasionally misinterpreted phrases or words.          "

## 2023-01-10 NOTE — PROCEDURES
Large Joint Aspiration/Injection: L subacromial bursa    Date/Time: 1/10/2023 7:30 AM  Performed by: Keron Asencio MD  Authorized by: Keron Asencio MD     Consent Done?:  Yes (Verbal)  Indications:  Pain  Site marked: the procedure site was marked    Timeout: prior to procedure the correct patient, procedure, and site was verified    Prep: patient was prepped and draped in usual sterile fashion      Local anesthesia used?: Yes    Local anesthetic:  Lidocaine 1% without epinephrine    Details:  Needle Size:  25 G  Ultrasonic Guidance for needle placement?: No    Location:  Shoulder  Site:  L subacromial bursa  Medications:  40 mg triamcinolone acetonide 40 mg/mL  Patient tolerance:  Patient tolerated the procedure well with no immediate complications

## 2023-01-11 ENCOUNTER — HOSPITAL ENCOUNTER (OUTPATIENT)
Dept: RADIOLOGY | Facility: HOSPITAL | Age: 68
Discharge: HOME | End: 2023-01-11
Attending: ORTHOPAEDIC SURGERY
Payer: MEDICARE

## 2023-01-11 DIAGNOSIS — S83.242D ACUTE MEDIAL MENISCAL TEAR, LEFT, SUBSEQUENT ENCOUNTER: ICD-10-CM

## 2023-01-11 PROCEDURE — 73721 MRI JNT OF LWR EXTRE W/O DYE: CPT | Mod: TC,PO,LT

## 2023-03-03 ENCOUNTER — PATIENT MESSAGE (OUTPATIENT)
Dept: FAMILY MEDICINE | Facility: CLINIC | Age: 68
End: 2023-03-03

## 2023-03-03 ENCOUNTER — PATIENT MESSAGE (OUTPATIENT)
Dept: CARDIOLOGY | Facility: CLINIC | Age: 68
End: 2023-03-03
Payer: MEDICARE

## 2023-03-13 ENCOUNTER — OFFICE VISIT (OUTPATIENT)
Dept: FAMILY MEDICINE | Facility: CLINIC | Age: 68
End: 2023-03-13
Payer: MEDICARE

## 2023-03-13 VITALS
RESPIRATION RATE: 18 BRPM | DIASTOLIC BLOOD PRESSURE: 82 MMHG | HEART RATE: 67 BPM | OXYGEN SATURATION: 98 % | HEIGHT: 71 IN | SYSTOLIC BLOOD PRESSURE: 118 MMHG | WEIGHT: 177 LBS | BODY MASS INDEX: 24.78 KG/M2 | TEMPERATURE: 98 F

## 2023-03-13 DIAGNOSIS — E78.5 HYPERLIPIDEMIA, UNSPECIFIED HYPERLIPIDEMIA TYPE: ICD-10-CM

## 2023-03-13 DIAGNOSIS — N52.9 ED (ERECTILE DYSFUNCTION) OF ORGANIC ORIGIN: Primary | ICD-10-CM

## 2023-03-13 DIAGNOSIS — M1A.00X0 IDIOPATHIC CHRONIC GOUT WITHOUT TOPHUS, UNSPECIFIED SITE: ICD-10-CM

## 2023-03-13 DIAGNOSIS — I10 ESSENTIAL HYPERTENSION: ICD-10-CM

## 2023-03-13 DIAGNOSIS — M1A.0720 CHRONIC IDIOPATHIC GOUT INVOLVING TOE OF LEFT FOOT WITHOUT TOPHUS: ICD-10-CM

## 2023-03-13 PROCEDURE — 99214 PR OFFICE/OUTPT VISIT, EST, LEVL IV, 30-39 MIN: ICD-10-PCS | Mod: S$PBB,AQ,, | Performed by: FAMILY MEDICINE

## 2023-03-13 PROCEDURE — 99214 OFFICE O/P EST MOD 30 MIN: CPT | Mod: S$PBB,AQ,, | Performed by: FAMILY MEDICINE

## 2023-03-13 PROCEDURE — 99213 OFFICE O/P EST LOW 20 MIN: CPT | Performed by: FAMILY MEDICINE

## 2023-03-13 RX ORDER — ENALAPRIL MALEATE 10 MG/1
10 TABLET ORAL DAILY
Qty: 90 TABLET | Refills: 0 | Status: SHIPPED | OUTPATIENT
Start: 2023-03-13 | End: 2023-06-20 | Stop reason: SDUPTHER

## 2023-04-06 ENCOUNTER — LAB VISIT (OUTPATIENT)
Dept: LAB | Facility: HOSPITAL | Age: 68
End: 2023-04-06
Attending: FAMILY MEDICINE
Payer: MEDICARE

## 2023-04-06 DIAGNOSIS — E78.5 HYPERLIPIDEMIA, UNSPECIFIED HYPERLIPIDEMIA TYPE: ICD-10-CM

## 2023-04-06 DIAGNOSIS — I10 PRIMARY HYPERTENSION: Primary | ICD-10-CM

## 2023-04-06 DIAGNOSIS — M1A.0720 CHRONIC IDIOPATHIC GOUT INVOLVING TOE OF LEFT FOOT WITHOUT TOPHUS: ICD-10-CM

## 2023-04-06 LAB
ALBUMIN SERPL BCP-MCNC: 4 G/DL (ref 3.5–5.2)
ALP SERPL-CCNC: 59 U/L (ref 55–135)
ALT SERPL W/O P-5'-P-CCNC: 21 U/L (ref 10–44)
ANION GAP SERPL CALC-SCNC: 9 MMOL/L (ref 8–16)
AST SERPL-CCNC: 21 U/L (ref 10–40)
BILIRUB SERPL-MCNC: 1 MG/DL (ref 0.1–1)
BUN SERPL-MCNC: 17 MG/DL (ref 8–23)
CALCIUM SERPL-MCNC: 9.3 MG/DL (ref 8.7–10.5)
CHLORIDE SERPL-SCNC: 105 MMOL/L (ref 95–110)
CHOLEST SERPL-MCNC: 224 MG/DL (ref 120–199)
CHOLEST/HDLC SERPL: 4.1 {RATIO} (ref 2–5)
CO2 SERPL-SCNC: 27 MMOL/L (ref 23–29)
CREAT SERPL-MCNC: 1.1 MG/DL (ref 0.5–1.4)
EST. GFR  (NO RACE VARIABLE): >60 ML/MIN/1.73 M^2
GLUCOSE SERPL-MCNC: 88 MG/DL (ref 70–110)
HDLC SERPL-MCNC: 55 MG/DL (ref 40–75)
HDLC SERPL: 24.6 % (ref 20–50)
LDLC SERPL CALC-MCNC: 151.8 MG/DL (ref 63–159)
NONHDLC SERPL-MCNC: 169 MG/DL
POTASSIUM SERPL-SCNC: 4.4 MMOL/L (ref 3.5–5.1)
PROT SERPL-MCNC: 6.9 G/DL (ref 6–8.4)
SODIUM SERPL-SCNC: 141 MMOL/L (ref 136–145)
TRIGL SERPL-MCNC: 86 MG/DL (ref 30–150)
URATE SERPL-MCNC: 7.3 MG/DL (ref 3.4–7)

## 2023-04-06 PROCEDURE — 84550 ASSAY OF BLOOD/URIC ACID: CPT | Performed by: FAMILY MEDICINE

## 2023-04-06 PROCEDURE — 80053 COMPREHEN METABOLIC PANEL: CPT | Performed by: FAMILY MEDICINE

## 2023-04-06 PROCEDURE — 36415 COLL VENOUS BLD VENIPUNCTURE: CPT | Performed by: FAMILY MEDICINE

## 2023-04-06 PROCEDURE — 80061 LIPID PANEL: CPT | Performed by: FAMILY MEDICINE

## 2023-04-06 RX ORDER — ALLOPURINOL 100 MG/1
100 TABLET ORAL DAILY
Qty: 30 TABLET | Refills: 11 | Status: SHIPPED | OUTPATIENT
Start: 2023-04-06 | End: 2023-09-28

## 2023-05-16 ENCOUNTER — OFFICE VISIT (OUTPATIENT)
Dept: ORTHOPEDICS | Facility: CLINIC | Age: 68
End: 2023-05-16
Payer: MEDICARE

## 2023-05-16 VITALS
BODY MASS INDEX: 24.78 KG/M2 | SYSTOLIC BLOOD PRESSURE: 122 MMHG | WEIGHT: 177 LBS | DIASTOLIC BLOOD PRESSURE: 78 MMHG | HEIGHT: 71 IN

## 2023-05-16 DIAGNOSIS — M17.12 PRIMARY OSTEOARTHRITIS OF LEFT KNEE: Primary | ICD-10-CM

## 2023-05-16 DIAGNOSIS — M23.204 DEGENERATIVE TEAR OF MEDIAL MENISCUS OF LEFT KNEE: ICD-10-CM

## 2023-05-16 PROCEDURE — 20610 LARGE JOINT ASPIRATION/INJECTION: L KNEE: ICD-10-PCS | Mod: LT,S$GLB,, | Performed by: ORTHOPAEDIC SURGERY

## 2023-05-16 PROCEDURE — 20610 DRAIN/INJ JOINT/BURSA W/O US: CPT | Mod: LT,S$GLB,, | Performed by: ORTHOPAEDIC SURGERY

## 2023-05-16 PROCEDURE — 99213 OFFICE O/P EST LOW 20 MIN: CPT | Mod: 25,S$GLB,, | Performed by: ORTHOPAEDIC SURGERY

## 2023-05-16 PROCEDURE — 99213 PR OFFICE/OUTPT VISIT, EST, LEVL III, 20-29 MIN: ICD-10-PCS | Mod: 25,S$GLB,, | Performed by: ORTHOPAEDIC SURGERY

## 2023-05-16 RX ORDER — TRIAMCINOLONE ACETONIDE 40 MG/ML
40 INJECTION, SUSPENSION INTRA-ARTICULAR; INTRAMUSCULAR
Status: DISCONTINUED | OUTPATIENT
Start: 2023-05-16 | End: 2023-05-16 | Stop reason: HOSPADM

## 2023-05-16 RX ADMIN — TRIAMCINOLONE ACETONIDE 40 MG: 40 INJECTION, SUSPENSION INTRA-ARTICULAR; INTRAMUSCULAR at 08:05

## 2023-05-16 NOTE — PROGRESS NOTES
[facility]    Subjective:     Chief Complaint:   Chief Complaint   Patient presents with    Left Knee - Pain     Pt has come in for MRI results of the L knee, pt states the knee is giving him problems still, pain comes and goes, pts states if he walks on it too long he starts limping       Past Medical History:   Diagnosis Date    Diverticulitis 11/18/2020    Gout     Hypertension     Kidney stone        Past Surgical History:   Procedure Laterality Date    adnoidectomy      ANGIOGRAM, CORONARY, WITH LEFT HEART CATHETERIZATION Left 5/27/2020    Procedure: Left heart cath;  Surgeon: Nick Kerr MD;  Location: [facility] CATH/EP LAB;  Service: Cardiology;  Laterality: Left;    APPENDECTOMY      COLON SURGERY      colon rescection benign polyp    tonsillectomy         Current Outpatient Medications   Medication Sig    ascorbic acid, vitamin C, (VITAMIN C) 1000 MG tablet Take by mouth once daily.    aspirin 81 MG Chew Take 1 tablet (81 mg total) by mouth once daily.    biotin 5,000 mcg TbDL Take 5,000 mcg by mouth once daily.     cranberry 500 mg Cap Take by mouth.    enalapril (VASOTEC) 10 MG tablet Take 1 tablet (10 mg total) by mouth once daily.    glucosamine &chondroit-mv-min3 481-640-37-0.5 mg Tab Take 1 tablet by mouth once daily.    saw palmetto 160 MG capsule Take 160 mg by mouth 2 (two) times daily.    allopurinoL (ZYLOPRIM) 100 MG tablet Take 1 tablet (100 mg total) by mouth once daily.     No current facility-administered medications for this visit.       Review of patient's allergies indicates:  No Known Allergies    Family History   Problem Relation Age of Onset    Hypertension Mother     Diabetes Mother     No Known Problems Father     No Known Problems Brother     No Known Problems Brother     No Known Problems Brother     No Known Problems Brother     No Known Problems Brother        Social History     Socioeconomic History    Marital status:    Tobacco Use    Smoking status: Never     Smokeless tobacco: Never   Substance and Sexual Activity    Alcohol use: No    Drug use: No    Sexual activity: Not Currently     Social Determinants of Health     Financial Resource Strain: Low Risk     Difficulty of Paying Living Expenses: Not hard at all   Food Insecurity: No Food Insecurity    Worried About Running Out of Food in the Last Year: Never true    Ran Out of Food in the Last Year: Never true   Transportation Needs: No Transportation Needs    Lack of Transportation (Medical): No    Lack of Transportation (Non-Medical): No   Physical Activity: Sufficiently Active    Days of Exercise per Week: 4 days    Minutes of Exercise per Session: 60 min   Stress: No Stress Concern Present    Feeling of Stress : Not at all   Social Connections: Unknown    Frequency of Communication with Friends and Family: More than three times a week    Frequency of Social Gatherings with Friends and Family: More than three times a week    Active Member of Clubs or Organizations: Yes    Attends Club or Organization Meetings: More than 4 times per year    Marital Status:    Housing Stability: Low Risk     Unable to Pay for Housing in the Last Year: No    Number of Places Lived in the Last Year: 1    Unstable Housing in the Last Year: No       History of present illness:  67-year-old male, returns to clinic today returns to clinic today for left knee pain.  We started seeing him back in August, with some again for follow-up in January after a intra-articular injection.  Still continues to complain of intermittent pain.  He states he has no pain today however when he is physically active, he has knee aches.      Review of Systems:    Constitution: Negative for chills, fever, and sweats.  Negative for unexplained weight loss.    HENT:  Negative for headaches and blurry vision.    Cardiovascular:Negative for chest pain or irregular heart beat. Negative for hypertension.    Respiratory:  Negative for cough and shortness of  breath.    Gastrointestinal: Negative for abdominal pain, heartburn, melena, nausea, and vomitting.    Genitourinary:  Negative bladder incontinence and dysuria.    Musculoskeletal:  See HPI for details.     Neurological: Negative for numbness.    Psychiatric/Behavioral: Negative for depression.  The patient is not nervous/anxious.      Endocrine: Negative for polyuria    Hematologic/Lymphatic: Negative for bleeding problem.  Does not bruise/bleed easily.    Skin: Negative for poor would healing and rash    Objective:      Physical Examination:    Vital Signs:    Vitals:    05/16/23 0825   BP: 122/78       Body mass index is 24.69 kg/m².    This a well-developed, well nourished patient in no acute distress.  They are alert and oriented and cooperative to examination.        Examination of the left knee, skin is dry intact, no erythema ecchymosis, no signs symptoms of infection.  No effusion.  Range of motion 0-120 degrees.  Stable anterior-posterior varus and valgus stress.  Calf soft nontender, straight leg raise negative.    Pertinent New Results:  Narrative & Impression  EXAMINATION:  MRI left knee     CLINICAL HISTORY: Medial pain     TECHNIQUE:  Multiplanar multisequence MR images were obtained through the left knee.     COMPARISON: Left knee radiographs dated 8/1/2022     FINDINGS:   The ACL is attenuated and demonstrates abnormal changes which suggests a partial tear or sprain. The PCL is intact.     There is grade 1 injury to the MCL. The lateral collateral ligament is unremarkable.     The quadriceps and patellar tendons, as well as the patellar retinaculum, are intact.     The medial meniscus is extruded. Abnormal signal changes along the posterior horn of the medial meniscus compatible with a complex tear. The inner margin is blunted. Posterior horn of the lateral meniscus demonstrates abnormal vertical signal that reaches both articular surfaces and is compatible with a tear.     There is mild cortical  irregularity along the lateral femoral condyle which demonstrates a sliver of surrounding edema. This could be related to an unstable osteochondral defect (series 3 image 22). There is a marked amount of bone marrow edema along the medial femoral condyle. There is bone marrow along the superior patellar pole likely related to bone contusion.     There is cartilaginous thinning/loss along the medial compartment. There is cartilaginous loss along the medial patellar facet.     There is a smalljoint effusion.  No Baker's cyst is present.     IMPRESSION:  1. ACL is attenuated and demonstrates abnormal signal changes likely related to a partial tear or sprain.  2. Grade 1 MCL injury.  3. The medial meniscus is extruded. Complex tear along the posterior horn of the medial meniscus.  4. Complex tear along the posterior horn of the lateral meniscus.  5. Possible osteochondral defect along the medial femoral condyle. There is a sliver of surrounding edema which could represent an unstable fragment.  6. Marked amount of bone marrow edema along the medial femoral condyle and to a lesser extent along the superior patellar pole likely related to bone contusions. It is difficult to assess for a fracture along the patella due to surrounding bone marrow edema. Focal palpation may be helpful if there is concern for fracture in this area     Electronically signed by:  David Mckeon MD  1/11/2023 10:00 AM CST Workstation: [id number]           Specimen Collected: 01/11/23 08:33 Last Resulted: 01/11/23 10:00           XRAY Report / Interpretation:       Assessment/Plan:      67-year-old male with MRI of the left knee which shows some moderate to severe osteoarthritic changes in addition to mediolateral meniscal tears.  He is not in a great deal of discomfort today.  He seems to manage his pain well.  He has only had 1 injection in the left knee.  We discussed doing nothing, reinjecting the knee, arthroscopy for a clean-up, but  "ultimately patient may need a total knee arthroplasty.  Scoping his knee could make his pain worse.  We opted for repeat injection, we did lidocaine and triamcinolone anterior lateral approach sterile technique left knee patient tolerated well.  This did well for him for many months.  He has not been injected since August.  We will set him a follow-up for 3 months, he is doing great he can cancel in follow-up p.r.n..  He better understands the nature and the likely progressive course of his disease process.    Jerry Alva, Physician Assistant, served in the capacity as a "scribe" for this patient encounter.  A "face-to-face" encounter occurred with Dr. Keron Asencio on this date.  The treatment plan and medical decision-making is outlined above. Patient was seen and examined with a chaperone.       This note was created using Dragon voice recognition software that occasionally misinterpreted phrases or words.          "

## 2023-05-16 NOTE — PROCEDURES
Large Joint Aspiration/Injection: L knee    Date/Time: 5/16/2023 8:30 AM  Performed by: Keron Asencio MD  Authorized by: Keron Asencio MD     Consent Done?:  Yes (Verbal)  Indications:  Arthritis and pain  Site marked: the procedure site was marked    Timeout: prior to procedure the correct patient, procedure, and site was verified    Prep: patient was prepped and draped in usual sterile fashion      Local anesthesia used?: Yes    Local anesthetic:  Lidocaine 1% without epinephrine  Ultrasonic Guidance for needle placement?: No    Location:  Knee  Site:  L knee  Medications:  40 mg triamcinolone acetonide 40 mg/mL  Patient tolerance:  Patient tolerated the procedure well with no immediate complications

## 2023-06-20 DIAGNOSIS — I10 ESSENTIAL HYPERTENSION: ICD-10-CM

## 2023-06-21 RX ORDER — ENALAPRIL MALEATE 10 MG/1
TABLET ORAL
Qty: 90 TABLET | Refills: 1 | Status: SHIPPED | OUTPATIENT
Start: 2023-06-21 | End: 2023-12-28 | Stop reason: SDUPTHER

## 2023-06-21 RX ORDER — ENALAPRIL MALEATE 10 MG/1
10 TABLET ORAL DAILY
Qty: 90 TABLET | Refills: 1 | Status: SHIPPED | OUTPATIENT
Start: 2023-06-21 | End: 2023-06-21

## 2023-08-23 DIAGNOSIS — U07.1 COVID: Primary | ICD-10-CM

## 2023-08-23 RX ORDER — PROMETHAZINE HYDROCHLORIDE AND DEXTROMETHORPHAN HYDROBROMIDE 6.25; 15 MG/5ML; MG/5ML
10 SYRUP ORAL NIGHTLY
Qty: 180 ML | Refills: 2 | Status: SHIPPED | OUTPATIENT
Start: 2023-08-23 | End: 2023-09-28

## 2023-08-23 NOTE — PROGRESS NOTES
Patient tested positive for COVID yesterday has a cough low-grade fever and sinus congestion  COVID risk score equals 3.  Your a candidate for Paxlovid therapy.  Avoid all your natural over-the-counter supplements while taking this.   COVID test is positive and you're symptomatic:  Quarenteen for 5 days and mask for another 5 days  Vitamin-D 5000 units per day  Vitamin-C 500 mg per day  Zinc 50 mg per day

## 2023-09-28 ENCOUNTER — OFFICE VISIT (OUTPATIENT)
Dept: FAMILY MEDICINE | Facility: CLINIC | Age: 68
End: 2023-09-28
Payer: MEDICARE

## 2023-09-28 VITALS
BODY MASS INDEX: 24.64 KG/M2 | RESPIRATION RATE: 18 BRPM | TEMPERATURE: 98 F | OXYGEN SATURATION: 99 % | HEIGHT: 71 IN | HEART RATE: 77 BPM | WEIGHT: 176 LBS | DIASTOLIC BLOOD PRESSURE: 68 MMHG | SYSTOLIC BLOOD PRESSURE: 124 MMHG

## 2023-09-28 DIAGNOSIS — I10 ESSENTIAL HYPERTENSION: ICD-10-CM

## 2023-09-28 DIAGNOSIS — E78.5 HYPERLIPIDEMIA, UNSPECIFIED HYPERLIPIDEMIA TYPE: ICD-10-CM

## 2023-09-28 DIAGNOSIS — M1A.00X0 IDIOPATHIC CHRONIC GOUT WITHOUT TOPHUS, UNSPECIFIED SITE: ICD-10-CM

## 2023-09-28 DIAGNOSIS — I10 PRIMARY HYPERTENSION: Primary | ICD-10-CM

## 2023-09-28 DIAGNOSIS — Z12.5 PROSTATE CANCER SCREENING: ICD-10-CM

## 2023-09-28 PROCEDURE — 99213 OFFICE O/P EST LOW 20 MIN: CPT | Performed by: FAMILY MEDICINE

## 2023-09-28 PROCEDURE — 99213 OFFICE O/P EST LOW 20 MIN: CPT | Mod: S$PBB,AQ,, | Performed by: FAMILY MEDICINE

## 2023-09-28 PROCEDURE — 99213 PR OFFICE/OUTPT VISIT, EST, LEVL III, 20-29 MIN: ICD-10-PCS | Mod: S$PBB,AQ,, | Performed by: FAMILY MEDICINE

## 2023-09-28 RX ORDER — EZETIMIBE 10 MG/1
10 TABLET ORAL DAILY
Qty: 90 TABLET | Refills: 3 | Status: SHIPPED | OUTPATIENT
Start: 2023-09-28 | End: 2023-12-28

## 2023-09-28 NOTE — PROGRESS NOTES
Subjective:       Patient ID: Carlos Saravia is a 68 y.o. male.    Chief Complaint: Hypertension      Hypertension  Pertinent negatives include no chest pain, headaches, neck pain or palpitations.       Allergies and Medications:   Review of patient's allergies indicates:  No Known Allergies  Current Outpatient Medications   Medication Sig Dispense Refill    ascorbic acid, vitamin C, (VITAMIN C) 1000 MG tablet Take by mouth once daily.      biotin 5,000 mcg TbDL Take 5,000 mcg by mouth once daily.       cranberry 500 mg Cap Take by mouth.      enalapril (VASOTEC) 10 MG tablet Take 1 tablet by mouth once daily 90 tablet 1    glucosamine &chondroit-mv-min3 536-469-02-0.5 mg Tab Take 1 tablet by mouth once daily.      saw palmetto 160 MG capsule Take 160 mg by mouth 2 (two) times daily.      allopurinoL (ZYLOPRIM) 100 MG tablet Take 1 tablet (100 mg total) by mouth once daily. 30 tablet 11    aspirin 81 MG Chew Take 1 tablet (81 mg total) by mouth once daily. (Patient not taking: Reported on 9/28/2023)  0    ezetimibe (ZETIA) 10 mg tablet Take 1 tablet (10 mg total) by mouth once daily. 90 tablet 3    promethazine-dextromethorphan (PROMETHAZINE-DM) 6.25-15 mg/5 mL Syrp Take 10 mLs by mouth every evening. 180 mL 2     No current facility-administered medications for this visit.       Family History:   Family History   Problem Relation Age of Onset    Hypertension Mother     Diabetes Mother     No Known Problems Father     No Known Problems Brother     No Known Problems Brother     No Known Problems Brother     No Known Problems Brother     No Known Problems Brother        Social History:   Social History     Socioeconomic History    Marital status:    Tobacco Use    Smoking status: Never    Smokeless tobacco: Never   Substance and Sexual Activity    Alcohol use: No    Drug use: No    Sexual activity: Not Currently     Social Determinants of Health     Financial Resource Strain: Low Risk  (9/26/2023)    Overall  Financial Resource Strain (CARDIA)     Difficulty of Paying Living Expenses: Not hard at all   Food Insecurity: No Food Insecurity (9/26/2023)    Hunger Vital Sign     Worried About Running Out of Food in the Last Year: Never true     Ran Out of Food in the Last Year: Never true   Transportation Needs: No Transportation Needs (9/26/2023)    PRAPARE - Transportation     Lack of Transportation (Medical): No     Lack of Transportation (Non-Medical): No   Physical Activity: Sufficiently Active (9/26/2023)    Exercise Vital Sign     Days of Exercise per Week: 3 days     Minutes of Exercise per Session: 60 min   Stress: No Stress Concern Present (9/26/2023)    [employer] - Occupational Stress Questionnaire     Feeling of Stress : Not at all   Social Connections: Unknown (9/26/2023)    Social Connection and Isolation Panel [NHANES]     Frequency of Communication with Friends and Family: More than three times a week     Frequency of Social Gatherings with Friends and Family: More than three times a week     Active Member of Clubs or Organizations: Yes     Attends Club or Organization Meetings: More than 4 times per year     Marital Status:    Housing Stability: Low Risk  (9/26/2023)    Housing Stability Vital Sign     Unable to Pay for Housing in the Last Year: No     Number of Places Lived in the Last Year: 1     Unstable Housing in the Last Year: No       Review of Systems   Constitutional:  Negative for activity change and unexpected weight change.   HENT:  Negative for hearing loss, rhinorrhea and trouble swallowing.    Eyes:  Negative for discharge and visual disturbance.   Respiratory:  Negative for chest tightness and wheezing.    Cardiovascular:  Negative for chest pain and palpitations.   Gastrointestinal:  Negative for blood in stool, constipation, diarrhea and vomiting.   Endocrine: Negative for polydipsia and polyuria.   Genitourinary:  Negative for difficulty urinating,  hematuria and urgency.   Musculoskeletal:  Negative for arthralgias, joint swelling and neck pain.   Neurological:  Negative for weakness and headaches.   Psychiatric/Behavioral:  Negative for confusion and dysphoric mood.        Objective:     Vitals:    09/28/23 0928   BP: 124/68   Pulse: 77   Resp: 18   Temp: 98.2 °F (36.8 °C)        Physical Exam  Vitals and nursing note reviewed.   Constitutional:       Appearance: He is well-developed. He is not diaphoretic.   HENT:      Head: Normocephalic.   Eyes:      Conjunctiva/sclera: Conjunctivae normal.      Pupils: Pupils are equal, round, and reactive to light.   Cardiovascular:      Rate and Rhythm: Normal rate and regular rhythm.      Heart sounds: Normal heart sounds. No murmur heard.     No friction rub. No gallop.   Pulmonary:      Effort: Pulmonary effort is normal. No respiratory distress.      Breath sounds: Normal breath sounds. No stridor. No wheezing, rhonchi or rales.   Chest:      Chest wall: No tenderness.   Skin:     General: Skin is warm and dry.   Psychiatric:         Behavior: Behavior normal.         Thought Content: Thought content normal.         Judgment: Judgment normal.         Assessment:       1. Primary hypertension    2. Hyperlipidemia, unspecified hyperlipidemia type    3. Essential hypertension    4. Prostate cancer screening        Plan:       Carlos was seen today for hypertension.    Diagnoses and all orders for this visit:    Primary hypertension    Hyperlipidemia, unspecified hyperlipidemia type  -     ezetimibe (ZETIA) 10 mg tablet; Take 1 tablet (10 mg total) by mouth once daily.  -     Lipid Panel; Future  -     Comprehensive Metabolic Panel; Future    Essential hypertension    Prostate cancer screening  -     PSA, Screening; Future         No follow-ups on file.

## 2023-09-28 NOTE — PROGRESS NOTES
Subjective:       Patient ID: Carlos Saravia is a 68 y.o. male.    Chief Complaint: Hypertension      Patient here for follow-up on cholesterol and blood pressure.  Lab Results       Component                Value               Date                       WBC                      11.18               11/19/2020                 HGB                      14.1                11/19/2020                 HCT                      44.4                11/19/2020                 PLT                      296                 11/19/2020                 CHOL                     224 (H)             04/06/2023                 TRIG                     86                  04/06/2023                 HDL                      55                  04/06/2023                 ALT                      21                  04/06/2023                 AST                      21                  04/06/2023                 NA                       141                 04/06/2023                 K                        4.4                 04/06/2023                 CL                       105                 04/06/2023                 CREATININE               1.1                 04/06/2023                 BUN                      17                  04/06/2023                 CO2                      27                  04/06/2023                 TSH                      1.450               05/27/2020                 PSA                      2.2                 07/08/2022                 INR                      1.1                 11/18/2020                 HGBA1C                   5.4                 07/21/2022            Lab Results       Component                Value               Date                       CHOL                     224 (H)             04/06/2023                 CHOL                     251 (H)             07/08/2022                 CHOL                     171                 07/09/2021            Lab Results       Component        "         Value               Date                       HDL                      55                  04/06/2023                 HDL                      49                  07/08/2022                 HDL                      52                  07/09/2021            Lab Results       Component                Value               Date                       LDLCALC                  151.8               04/06/2023                 LDLCALC                  180.2 (H)           07/08/2022                 LDLCALC                  97.8                07/09/2021            No results found for: "DLDL"  Lab Results       Component                Value               Date                       TRIG                     86                  04/06/2023                 TRIG                     109                 07/08/2022                 TRIG                     106                 07/09/2021              f1 Lab Results       Component                Value               Date                       CHOLHDL                  24.6                04/06/2023                 CHOLHDL                  19.5 (L)            07/08/2022                 CHOLHDL                  30.4                07/09/2021            Patient has failed pravastatin secondary to side effects.  Patient is reluctant to try any other statins or injectables  Patient is reluctant to take injections.      Hypertension  Pertinent negatives include no chest pain, headaches, neck pain or palpitations.       Allergies and Medications:   Review of patient's allergies indicates:  No Known Allergies  Current Outpatient Medications   Medication Sig Dispense Refill    ascorbic acid, vitamin C, (VITAMIN C) 1000 MG tablet Take by mouth once daily.      biotin 5,000 mcg TbDL Take 5,000 mcg by mouth once daily.       cranberry 500 mg Cap Take by mouth.      enalapril (VASOTEC) 10 MG tablet Take 1 tablet by mouth once daily 90 tablet 1    glucosamine &chondroit-mv-min3 756-829-22-0.5 " mg Tab Take 1 tablet by mouth once daily.      saw palmetto 160 MG capsule Take 160 mg by mouth 2 (two) times daily.      allopurinoL (ZYLOPRIM) 100 MG tablet Take 1 tablet (100 mg total) by mouth once daily. 30 tablet 11    aspirin 81 MG Chew Take 1 tablet (81 mg total) by mouth once daily. (Patient not taking: Reported on 9/28/2023)  0    ezetimibe (ZETIA) 10 mg tablet Take 1 tablet (10 mg total) by mouth once daily. 90 tablet 3    promethazine-dextromethorphan (PROMETHAZINE-DM) 6.25-15 mg/5 mL Syrp Take 10 mLs by mouth every evening. 180 mL 2     No current facility-administered medications for this visit.       Family History:   Family History   Problem Relation Age of Onset    Hypertension Mother     Diabetes Mother     No Known Problems Father     No Known Problems Brother     No Known Problems Brother     No Known Problems Brother     No Known Problems Brother     No Known Problems Brother        Social History:   Social History     Socioeconomic History    Marital status:    Tobacco Use    Smoking status: Never    Smokeless tobacco: Never   Substance and Sexual Activity    Alcohol use: No    Drug use: No    Sexual activity: Not Currently     Social Determinants of Health     Financial Resource Strain: Low Risk  (9/26/2023)    Overall Financial Resource Strain (CARDIA)     Difficulty of Paying Living Expenses: Not hard at all   Food Insecurity: No Food Insecurity (9/26/2023)    Hunger Vital Sign     Worried About Running Out of Food in the Last Year: Never true     Ran Out of Food in the Last Year: Never true   Transportation Needs: No Transportation Needs (9/26/2023)    PRAPARE - Transportation     Lack of Transportation (Medical): No     Lack of Transportation (Non-Medical): No   Physical Activity: Sufficiently Active (9/26/2023)    Exercise Vital Sign     Days of Exercise per Week: 3 days     Minutes of Exercise per Session: 60 min   Stress: No Stress Concern Present (9/26/2023)    [employer] - Occupational Stress Questionnaire     Feeling of Stress : Not at all   Social Connections: Unknown (9/26/2023)    Social Connection and Isolation Panel [NHANES]     Frequency of Communication with Friends and Family: More than three times a week     Frequency of Social Gatherings with Friends and Family: More than three times a week     Active Member of Clubs or Organizations: Yes     Attends Club or Organization Meetings: More than 4 times per year     Marital Status:    Housing Stability: Low Risk  (9/26/2023)    Housing Stability Vital Sign     Unable to Pay for Housing in the Last Year: No     Number of Places Lived in the Last Year: 1     Unstable Housing in the Last Year: No       Review of Systems   Constitutional:  Negative for activity change and unexpected weight change.   HENT:  Negative for hearing loss, rhinorrhea and trouble swallowing.    Eyes:  Negative for discharge and visual disturbance.   Respiratory:  Negative for chest tightness and wheezing.    Cardiovascular:  Negative for chest pain and palpitations.   Gastrointestinal:  Negative for blood in stool, constipation, diarrhea and vomiting.   Endocrine: Negative for polydipsia and polyuria.   Genitourinary:  Negative for difficulty urinating, hematuria and urgency.   Musculoskeletal:  Negative for arthralgias, joint swelling and neck pain.   Neurological:  Negative for weakness and headaches.   Psychiatric/Behavioral:  Negative for confusion and dysphoric mood.        Objective:     Vitals:    09/28/23 0928   BP: 124/68   Pulse: 77   Resp: 18   Temp: 98.2 °F (36.8 °C)        Physical Exam    Assessment:       1. Primary hypertension    2. Hyperlipidemia, unspecified hyperlipidemia type    3. Essential hypertension    4. Prostate cancer screening        Plan:       Carlos was seen today for hypertension.    Diagnoses and all orders for this visit:    Primary hypertension    Hyperlipidemia, unspecified hyperlipidemia  type  -     ezetimibe (ZETIA) 10 mg tablet; Take 1 tablet (10 mg total) by mouth once daily.  -     Lipid Panel; Future  -     Comprehensive Metabolic Panel; Future    Essential hypertension    Prostate cancer screening  -     PSA, Screening; Future         No follow-ups on file.

## 2023-10-04 ENCOUNTER — OFFICE VISIT (OUTPATIENT)
Dept: ORTHOPEDICS | Facility: CLINIC | Age: 68
End: 2023-10-04
Payer: MEDICARE

## 2023-10-04 VITALS — HEIGHT: 71 IN | BODY MASS INDEX: 24.64 KG/M2 | WEIGHT: 176 LBS

## 2023-10-04 DIAGNOSIS — M17.12 PRIMARY OSTEOARTHRITIS OF LEFT KNEE: Primary | ICD-10-CM

## 2023-10-04 PROCEDURE — 99213 OFFICE O/P EST LOW 20 MIN: CPT | Mod: 25,S$GLB,, | Performed by: PHYSICIAN ASSISTANT

## 2023-10-04 PROCEDURE — 99213 PR OFFICE/OUTPT VISIT, EST, LEVL III, 20-29 MIN: ICD-10-PCS | Mod: 25,S$GLB,, | Performed by: PHYSICIAN ASSISTANT

## 2023-10-04 PROCEDURE — 20610 DRAIN/INJ JOINT/BURSA W/O US: CPT | Mod: LT,S$GLB,, | Performed by: PHYSICIAN ASSISTANT

## 2023-10-04 PROCEDURE — 20610 LARGE JOINT ASPIRATION/INJECTION: L KNEE: ICD-10-PCS | Mod: LT,S$GLB,, | Performed by: PHYSICIAN ASSISTANT

## 2023-10-04 RX ORDER — TRIAMCINOLONE ACETONIDE 40 MG/ML
40 INJECTION, SUSPENSION INTRA-ARTICULAR; INTRAMUSCULAR
Status: DISCONTINUED | OUTPATIENT
Start: 2023-10-04 | End: 2023-10-04 | Stop reason: HOSPADM

## 2023-10-04 RX ADMIN — TRIAMCINOLONE ACETONIDE 40 MG: 40 INJECTION, SUSPENSION INTRA-ARTICULAR; INTRAMUSCULAR at 09:10

## 2023-10-04 NOTE — PROGRESS NOTES
[facility]  [address]  [place] [address]  Phone: [phone]   [phone]    Patient's PCP: Dick Mendez MD  Referring Provider: No ref. provider found    Subjective:      Chief Complaint:   Chief Complaint   Patient presents with    Left Knee - Pain     Left knee pain follow up. Received inj 05/16/23 which offered great relief. Requesting inj today       Past Medical History:   Diagnosis Date    Diverticulitis 11/18/2020    Gout     Hypertension     Kidney stone        Past Surgical History:   Procedure Laterality Date    adnoidectomy      ANGIOGRAM, CORONARY, WITH LEFT HEART CATHETERIZATION Left 5/27/2020    Procedure: Left heart cath;  Surgeon: Nick Kerr MD;  Location: [facility] CATH/EP LAB;  Service: Cardiology;  Laterality: Left;    APPENDECTOMY      COLON SURGERY      colon rescection benign polyp    tonsillectomy         Current Outpatient Medications   Medication Sig    ascorbic acid, vitamin C, (VITAMIN C) 1000 MG tablet Take by mouth once daily.    biotin 5,000 mcg TbDL Take 5,000 mcg by mouth once daily.     cranberry 500 mg Cap Take by mouth.    enalapril (VASOTEC) 10 MG tablet Take 1 tablet by mouth once daily    glucosamine &chondroit-mv-min3 716-199-14-0.5 mg Tab Take 1 tablet by mouth once daily.    saw palmetto 160 MG capsule Take 160 mg by mouth 2 (two) times daily.    ezetimibe (ZETIA) 10 mg tablet Take 1 tablet (10 mg total) by mouth once daily. (Patient not taking: Reported on 10/4/2023)     No current facility-administered medications for this visit.       Review of patient's allergies indicates:  No Known Allergies    Family History   Problem Relation Age of Onset    Hypertension Mother     Diabetes Mother     No Known Problems Father     No Known Problems Brother     No Known Problems Brother     No Known Problems Brother     No Known Problems Brother     No Known Problems Brother        Social History     Socioeconomic History    Marital status:     Tobacco Use    Smoking status: Never    Smokeless tobacco: Never   Substance and Sexual Activity    Alcohol use: No    Drug use: No    Sexual activity: Not Currently     Social Determinants of Health     Financial Resource Strain: Low Risk  (9/26/2023)    Overall Financial Resource Strain (CARDIA)     Difficulty of Paying Living Expenses: Not hard at all   Food Insecurity: No Food Insecurity (9/26/2023)    Hunger Vital Sign     Worried About Running Out of Food in the Last Year: Never true     Ran Out of Food in the Last Year: Never true   Transportation Needs: No Transportation Needs (9/26/2023)    PRAPARE - Transportation     Lack of Transportation (Medical): No     Lack of Transportation (Non-Medical): No   Physical Activity: Sufficiently Active (9/26/2023)    Exercise Vital Sign     Days of Exercise per Week: 3 days     Minutes of Exercise per Session: 60 min   Stress: No Stress Concern Present (9/26/2023)    [employer] - Occupational Stress Questionnaire     Feeling of Stress : Not at all   Social Connections: Unknown (9/26/2023)    Social Connection and Isolation Panel [NHANES]     Frequency of Communication with Friends and Family: More than three times a week     Frequency of Social Gatherings with Friends and Family: More than three times a week     Active Member of Clubs or Organizations: Yes     Attends Club or Organization Meetings: More than 4 times per year     Marital Status:    Housing Stability: Low Risk  (9/26/2023)    Housing Stability Vital Sign     Unable to Pay for Housing in the Last Year: No     Number of Places Lived in the Last Year: 1     Unstable Housing in the Last Year: No       Prior to meeting with the patient I reviewed the medical chart in [facility]. This included reviewing the previous progress notes from our office, review of the patient's last appointment with their primary care provider, review of any visits to the emergency room, and  review of any pain management appointments or procedures.    History of present illness: 68-year-old male, returns to clinic today returns to clinic today for left knee pain.  We started seeing him back in August 2022, with saw him again for follow-up in January and May after a intra-articular injections.  Still continues to complain of intermittent pain.  We also did an MRI of his left knee which showed moderate to severe osteoarthritis and degenerative meniscal tears.      Review of Systems:    Constitutional: Negative for chills, fever and weight loss.   HENT: Negative for congestion.    Eyes: Negative for discharge and redness.   Respiratory: Negative for cough and shortness of breath.    Cardiovascular: Negative for chest pain.   Gastrointestinal: Negative for nausea and vomiting.   Musculoskeletal: See HPI.   Skin: Negative for rash.   Neurological: Negative for headaches.   Endo/Heme/Allergies: Does not bruise/bleed easily.   Psychiatric/Behavioral: The patient is not nervous/anxious.    All other systems reviewed and are negative.       Objective:      Physical Examination:    Vital Signs:  There were no vitals filed for this visit.    Body mass index is 24.55 kg/m².    This a well-developed, well nourished patient in no acute distress.  They are alert and oriented and cooperative to examination.     Examination left knee, no effusion, no rashes, skin is dry and intact, no erythema ecchymosis, no signs symptoms infection.  Range of motion 0-120 degrees.  Stable anterior-posterior varus and valgus stress.  Calf soft nontender, straight leg raise negative.      Pertinent New Results:        XRAY Report / Interpretation:         Assessment:       1. Primary osteoarthritis of left knee      Plan:     Primary osteoarthritis of left knee  -     Cancel: X-Ray Knee 3 View Left  -     Large Joint Aspiration/Injection: L knee        Follow up in about 3 months (around 1/4/2024) for 3 month f/u, knee  pain.    68-year-old male with recent (May) MRI of the left knee which shows some moderate to severe osteoarthritic changes in addition to mediolateral meniscal tears.      He is not in a great deal of discomfort today.  He seems to manage his pain well.  He has only had 2 injections in the left knee.  We discussed doing nothing, reinjecting the knee, arthroscopy for a clean-up, but ultimately patient may need a total knee arthroplasty.  Patient requested and opted for repeat injection, we did lidocaine and triamcinolone anterior lateral approach sterile technique left knee patient tolerated well.  This did well for him for many months.  He has not been injected since May.  We will set him a follow-up for 3 months, he is doing great he can cancel in follow-up p.r.n..  He better understands the nature and the likely progressive course of his disease process.      JOSE G Clarke, PADanielleC    This note was created using [employer] voice recognition software that occasionally misinterprets words or phrases.

## 2023-10-04 NOTE — PROCEDURES
Large Joint Aspiration/Injection: L knee    Date/Time: 10/4/2023 9:00 AM    Performed by: Jerry Alva PA  Authorized by: Jerry Alva PA    Consent Done?:  Yes (Verbal)  Indications:  Arthritis and pain  Site marked: the procedure site was marked    Timeout: prior to procedure the correct patient, procedure, and site was verified    Prep: patient was prepped and draped in usual sterile fashion      Local anesthesia used?: Yes    Local anesthetic:  Lidocaine 1% without epinephrine    Details:  Needle Size:  25 G  Ultrasonic Guidance for needle placement?: No    Location:  Knee  Site:  L knee  Medications:  40 mg triamcinolone acetonide 40 mg/mL  Patient tolerance:  Patient tolerated the procedure well with no immediate complications

## 2023-10-05 ENCOUNTER — PATIENT MESSAGE (OUTPATIENT)
Dept: FAMILY MEDICINE | Facility: CLINIC | Age: 68
End: 2023-10-05

## 2023-10-05 ENCOUNTER — LAB VISIT (OUTPATIENT)
Dept: LAB | Facility: HOSPITAL | Age: 68
End: 2023-10-05
Attending: FAMILY MEDICINE
Payer: MEDICARE

## 2023-10-05 DIAGNOSIS — M1A.0720 CHRONIC IDIOPATHIC GOUT INVOLVING TOE OF LEFT FOOT WITHOUT TOPHUS: ICD-10-CM

## 2023-10-05 PROCEDURE — 80061 LIPID PANEL: CPT | Performed by: FAMILY MEDICINE

## 2023-10-05 PROCEDURE — 84550 ASSAY OF BLOOD/URIC ACID: CPT | Performed by: FAMILY MEDICINE

## 2023-10-05 PROCEDURE — 80053 COMPREHEN METABOLIC PANEL: CPT | Performed by: FAMILY MEDICINE

## 2023-10-05 PROCEDURE — 84153 ASSAY OF PSA TOTAL: CPT | Performed by: FAMILY MEDICINE

## 2023-10-05 PROCEDURE — 36415 COLL VENOUS BLD VENIPUNCTURE: CPT | Performed by: FAMILY MEDICINE

## 2023-10-05 NOTE — TELEPHONE ENCOUNTER
Patient read up on Zetia and he states it has the same side effects he experienced with the pravastatin.  He wants to take a half dose to see if that will help.

## 2023-12-26 ENCOUNTER — TELEPHONE (OUTPATIENT)
Dept: FAMILY MEDICINE | Facility: CLINIC | Age: 68
End: 2023-12-26
Payer: MEDICARE

## 2023-12-26 DIAGNOSIS — I10 ESSENTIAL HYPERTENSION: Primary | ICD-10-CM

## 2023-12-26 DIAGNOSIS — E78.5 HYPERLIPIDEMIA, UNSPECIFIED HYPERLIPIDEMIA TYPE: ICD-10-CM

## 2023-12-26 NOTE — TELEPHONE ENCOUNTER
----- Message from Ruthann Alejo sent at 12/26/2023  9:11 AM CST -----  Regarding: Lab orders  Patient called requesting a call regarding whether he needs to do any labs for his upcoming appointment on 12/28/23.  He states he was suppose to complete a cholesterol lab.  Please call the patient to advise.

## 2023-12-27 ENCOUNTER — LAB VISIT (OUTPATIENT)
Dept: LAB | Facility: HOSPITAL | Age: 68
End: 2023-12-27
Attending: NURSE PRACTITIONER
Payer: MEDICARE

## 2023-12-27 ENCOUNTER — PATIENT MESSAGE (OUTPATIENT)
Dept: ADMINISTRATIVE | Facility: OTHER | Age: 68
End: 2023-12-27
Payer: MEDICARE

## 2023-12-27 DIAGNOSIS — E78.5 HYPERLIPEMIA: Primary | ICD-10-CM

## 2023-12-27 LAB
ALBUMIN SERPL BCP-MCNC: 4.5 G/DL (ref 3.5–5.2)
ALP SERPL-CCNC: 68 U/L (ref 55–135)
ALT SERPL W/O P-5'-P-CCNC: 17 U/L (ref 10–44)
ANION GAP SERPL CALC-SCNC: 7 MMOL/L (ref 8–16)
AST SERPL-CCNC: 19 U/L (ref 10–40)
BILIRUB SERPL-MCNC: 0.6 MG/DL (ref 0.1–1)
BUN SERPL-MCNC: 12 MG/DL (ref 8–23)
CALCIUM SERPL-MCNC: 9.6 MG/DL (ref 8.7–10.5)
CHLORIDE SERPL-SCNC: 107 MMOL/L (ref 95–110)
CHOLEST SERPL-MCNC: 214 MG/DL (ref 120–199)
CHOLEST/HDLC SERPL: 4 {RATIO} (ref 2–5)
CO2 SERPL-SCNC: 24 MMOL/L (ref 23–29)
CREAT SERPL-MCNC: 1 MG/DL (ref 0.5–1.4)
EST. GFR  (NO RACE VARIABLE): >60 ML/MIN/1.73 M^2
GLUCOSE SERPL-MCNC: 93 MG/DL (ref 70–110)
HDLC SERPL-MCNC: 54 MG/DL (ref 40–75)
HDLC SERPL: 25.2 % (ref 20–50)
LDLC SERPL CALC-MCNC: 138.6 MG/DL (ref 63–159)
NONHDLC SERPL-MCNC: 160 MG/DL
POTASSIUM SERPL-SCNC: 4.1 MMOL/L (ref 3.5–5.1)
PROT SERPL-MCNC: 7.2 G/DL (ref 6–8.4)
SODIUM SERPL-SCNC: 138 MMOL/L (ref 136–145)
TRIGL SERPL-MCNC: 107 MG/DL (ref 30–150)

## 2023-12-27 PROCEDURE — 36415 COLL VENOUS BLD VENIPUNCTURE: CPT | Performed by: NURSE PRACTITIONER

## 2023-12-27 PROCEDURE — 80053 COMPREHEN METABOLIC PANEL: CPT | Performed by: NURSE PRACTITIONER

## 2023-12-27 PROCEDURE — 80061 LIPID PANEL: CPT | Performed by: NURSE PRACTITIONER

## 2023-12-28 ENCOUNTER — OFFICE VISIT (OUTPATIENT)
Dept: FAMILY MEDICINE | Facility: CLINIC | Age: 68
End: 2023-12-28
Payer: MEDICARE

## 2023-12-28 VITALS
TEMPERATURE: 98 F | DIASTOLIC BLOOD PRESSURE: 78 MMHG | OXYGEN SATURATION: 98 % | RESPIRATION RATE: 18 BRPM | HEART RATE: 67 BPM | SYSTOLIC BLOOD PRESSURE: 130 MMHG | BODY MASS INDEX: 24.08 KG/M2 | HEIGHT: 71 IN | WEIGHT: 172 LBS

## 2023-12-28 DIAGNOSIS — I10 PRIMARY HYPERTENSION: Primary | ICD-10-CM

## 2023-12-28 DIAGNOSIS — I10 ESSENTIAL HYPERTENSION: ICD-10-CM

## 2023-12-28 DIAGNOSIS — E78.5 HYPERLIPIDEMIA, UNSPECIFIED HYPERLIPIDEMIA TYPE: ICD-10-CM

## 2023-12-28 PROCEDURE — 99214 PR OFFICE/OUTPT VISIT, EST, LEVL IV, 30-39 MIN: ICD-10-PCS | Mod: ,,, | Performed by: FAMILY MEDICINE

## 2023-12-28 PROCEDURE — 99214 OFFICE O/P EST MOD 30 MIN: CPT | Mod: ,,, | Performed by: FAMILY MEDICINE

## 2023-12-28 RX ORDER — AMOXICILLIN 500 MG
2 CAPSULE ORAL DAILY
COMMUNITY

## 2023-12-28 RX ORDER — ENALAPRIL MALEATE 10 MG/1
10 TABLET ORAL DAILY
Qty: 90 TABLET | Refills: 1 | Status: SHIPPED | OUTPATIENT
Start: 2023-12-28 | End: 2024-06-25

## 2023-12-28 NOTE — PROGRESS NOTES
Subjective:       Patient ID: Carlos Saravia is a 68 y.o. male.    Chief Complaint: Hypertension and Hyperlipidemia      Is here to follow-up on hypertension and cholesterol.  She has been well-controlled at home.  BP Readings from Last 3 Encounters:  12/28/23 : 130/78  09/28/23 : 124/68  05/16/23 : 122/78  Lab Results       Component                Value               Date                       WBC                      11.18               11/19/2020                 HGB                      14.1                11/19/2020                 HCT                      44.4                11/19/2020                 PLT                      296                 11/19/2020                 CHOL                     214 (H)             12/27/2023                 TRIG                     107                 12/27/2023                 HDL                      54                  12/27/2023                 ALT                      17                  12/27/2023                 AST                      19                  12/27/2023                 NA                       138                 12/27/2023                 K                        4.1                 12/27/2023                 CL                       107                 12/27/2023                 CREATININE               1.0                 12/27/2023                 BUN                      12                  12/27/2023                 CO2                      24                  12/27/2023                 TSH                      1.450               05/27/2020                 PSA                      1.83                10/05/2023                 INR                      1.1                 11/18/2020                 HGBA1C                   5.4                 07/21/2022            Lab Results       Component                Value               Date                       CHOL                     214 (H)             12/27/2023                 CHOL                     252  (H)             10/05/2023                 CHOL                     252 (H)             10/05/2023            Lab Results       Component                Value               Date                       HDL                      54                  12/27/2023                 HDL                      58                  10/05/2023                 HDL                      58                  10/05/2023            Lab Results       Component                Value               Date                       LDLCALC                  138.6               12/27/2023    taking garlic, fish oil, and has cut down on fried foods.  Patient refuses statin therapy  .  Eating a very lean diet grilled foods no fried foods.         LDLCALC                  180.4 (H)           10/05/2023                 LDLCALC                  180.4 (H)           10/05/2023            Lab Results       Component                Value               Date                       TRIG                     107                 12/27/2023                 TRIG                     68                  10/05/2023                 TRIG                     68                  10/05/2023              Lab Results       Component                Value               Date                       CHOLHDL                  25.2                12/27/2023                 CHOLHDL                  23.0                10/05/2023                 CHOLHDL                  23.0                10/05/2023     Wt Readings from Last 3 Encounters:  12/28/23 : 78 kg (172 lb)  10/04/23 : 79.8 kg (176 lb)  09/28/23 : 79.8 kg (176 lb)                     Hypertension  This is a chronic problem. The problem is unchanged. The problem is controlled. Pertinent negatives include no chest pain, headaches, neck pain or palpitations.   Hyperlipidemia  Pertinent negatives include no chest pain.       Allergies and Medications:   Review of patient's allergies indicates:  No Known Allergies  Current Outpatient  Medications   Medication Sig Dispense Refill    ascorbic acid, vitamin C, (VITAMIN C) 1000 MG tablet Take by mouth once daily.      biotin 5,000 mcg TbDL Take 5,000 mcg by mouth once daily.       cranberry 500 mg Cap Take by mouth.      garlic Tab Take by mouth.      glucosamine &chondroit-mv-min3 458-608-50-0.5 mg Tab Take 1 tablet by mouth once daily.      omega-3 fatty acids/fish oil (FISH OIL-OMEGA-3 FATTY ACIDS) 300-1,000 mg capsule Take 2 capsules by mouth once daily.      saw palmetto 160 MG capsule Take 160 mg by mouth 2 (two) times daily.      enalapril (VASOTEC) 10 MG tablet Take 1 tablet (10 mg total) by mouth once daily. 90 tablet 1     No current facility-administered medications for this visit.       Family History:   Family History   Problem Relation Age of Onset    Hypertension Mother     Diabetes Mother     No Known Problems Father     No Known Problems Brother     No Known Problems Brother     No Known Problems Brother     No Known Problems Brother     No Known Problems Brother        Social History:   Social History     Socioeconomic History    Marital status:    Tobacco Use    Smoking status: Never    Smokeless tobacco: Never   Substance and Sexual Activity    Alcohol use: No    Drug use: No    Sexual activity: Not Currently     Social Determinants of Health     Financial Resource Strain: Low Risk  (12/27/2023)    Overall Financial Resource Strain (CARDIA)     Difficulty of Paying Living Expenses: Not hard at all   Food Insecurity: No Food Insecurity (12/27/2023)    Hunger Vital Sign     Worried About Running Out of Food in the Last Year: Never true     Ran Out of Food in the Last Year: Never true   Transportation Needs: No Transportation Needs (12/27/2023)    PRAPARE - Transportation     Lack of Transportation (Medical): No     Lack of Transportation (Non-Medical): No   Physical Activity: Sufficiently Active (12/27/2023)    Exercise Vital Sign     Days of Exercise per Week: 5 days      Minutes of Exercise per Session: 30 min   Stress: No Stress Concern Present (12/27/2023)    Citizen of [place] of Occupational Health - Occupational Stress Questionnaire     Feeling of Stress : Not at all   Social Connections: Unknown (12/27/2023)    Social Connection and Isolation Panel [NHANES]     Frequency of Communication with Friends and Family: More than three times a week     Frequency of Social Gatherings with Friends and Family: More than three times a week     Active Member of Clubs or Organizations: No     Attends Club or Organization Meetings: Never     Marital Status:    Housing Stability: Low Risk  (12/27/2023)    Housing Stability Vital Sign     Unable to Pay for Housing in the Last Year: No     Number of Places Lived in the Last Year: 1     Unstable Housing in the Last Year: No       Review of Systems   Constitutional:  Negative for activity change and unexpected weight change.   HENT:  Negative for hearing loss, rhinorrhea and trouble swallowing.    Eyes:  Negative for discharge and visual disturbance.   Respiratory:  Negative for chest tightness and wheezing.    Cardiovascular:  Negative for chest pain and palpitations.   Gastrointestinal:  Negative for blood in stool, constipation, diarrhea and vomiting.   Endocrine: Negative for polydipsia and polyuria.   Genitourinary:  Negative for difficulty urinating, hematuria and urgency.   Musculoskeletal:  Negative for arthralgias, joint swelling and neck pain.   Neurological:  Negative for weakness and headaches.   Psychiatric/Behavioral:  Negative for confusion and dysphoric mood.        Objective:     Vitals:    12/28/23 1011   BP: 130/78   Pulse: 67   Resp: 18   Temp: 98.4 °F (36.9 °C)        Physical Exam    Assessment:       1. Primary hypertension    2. Hyperlipidemia, unspecified hyperlipidemia type    3. Essential hypertension        Plan:       Carlos was seen today for hypertension and hyperlipidemia.    Diagnoses and all orders for this  visit:    Primary hypertension    Hyperlipidemia, unspecified hyperlipidemia type    Essential hypertension  -     enalapril (VASOTEC) 10 MG tablet; Take 1 tablet (10 mg total) by mouth once daily.         Follow up in about 6 months (around 6/28/2024) for follow up HTN, follow up cholesterol.

## 2024-01-08 ENCOUNTER — PATIENT MESSAGE (OUTPATIENT)
Dept: FAMILY MEDICINE | Facility: CLINIC | Age: 69
End: 2024-01-08
Payer: MEDICARE

## 2024-01-11 DIAGNOSIS — Z00.00 ENCOUNTER FOR MEDICARE ANNUAL WELLNESS EXAM: ICD-10-CM

## 2024-02-20 NOTE — PLAN OF CARE
Problem: Adult Inpatient Plan of Care  Goal: Plan of Care Review  Outcome: Met  Goal: Patient-Specific Goal (Individualization)  Outcome: Met  Goal: Absence of Hospital-Acquired Illness or Injury  Outcome: Met  Goal: Optimal Comfort and Wellbeing  Outcome: Met  Goal: Readiness for Transition of Care  Outcome: Met  Goal: Rounds/Family Conference  Outcome: Met     Problem: Fall Injury Risk  Goal: Absence of Fall and Fall-Related Injury  Outcome: Met  Intervention: Promote Injury-Free Environment  Flowsheets (Taken 5/27/2020 6331)  Safety Promotion/Fall Prevention: assistive device/personal item within reach; bed alarm set; Fall Risk reviewed with patient/family; family to remain at bedside; nonskid shoes/socks when out of bed; room near unit station; side rails raised x 2; instructed to call staff for mobility  Environmental Safety Modification: assistive device/personal items within reach; room near unit station; room organization consistent; lighting adjusted; clutter free environment maintained      Called report to ICU @[phone].

## 2024-05-13 ENCOUNTER — OFFICE VISIT (OUTPATIENT)
Dept: FAMILY MEDICINE | Facility: CLINIC | Age: 69
End: 2024-05-13
Payer: MEDICARE

## 2024-05-13 VITALS
TEMPERATURE: 97 F | BODY MASS INDEX: 23.8 KG/M2 | SYSTOLIC BLOOD PRESSURE: 104 MMHG | HEART RATE: 82 BPM | HEIGHT: 71 IN | DIASTOLIC BLOOD PRESSURE: 70 MMHG | RESPIRATION RATE: 18 BRPM | WEIGHT: 170 LBS | OXYGEN SATURATION: 98 %

## 2024-05-13 DIAGNOSIS — L72.0 EPIDERMOID CYST OF FINGER OF RIGHT HAND: Primary | ICD-10-CM

## 2024-05-13 PROCEDURE — 99214 OFFICE O/P EST MOD 30 MIN: CPT | Mod: PBBFAC,PN | Performed by: FAMILY MEDICINE

## 2024-05-13 PROCEDURE — 99213 OFFICE O/P EST LOW 20 MIN: CPT | Mod: S$PBB,AQ,, | Performed by: FAMILY MEDICINE

## 2024-05-13 PROCEDURE — 99999 PR PBB SHADOW E&M-EST. PATIENT-LVL IV: CPT | Mod: PBBFAC,,, | Performed by: FAMILY MEDICINE

## 2024-05-13 NOTE — PROGRESS NOTES
Subjective:       Patient ID: Carlos Saravia is a 68 y.o. male.    Chief Complaint: Finger Pain      Is here with a lesion on his right middle finger that has been there for several months it started out with a paronychial trauma than it healed with a overlying crust which he peels off or drains some clear fluid from it.  Patient Active Problem List:     Hypertension     Prostate enlargement     Internal hemorrhoid     Kidney stone     Gout     Colon polyp     Hyperlipidemia     Encounter for screening for malignant neoplasm of prostate      ED (erectile dysfunction) of organic origin     Melanocytic nevus of skin     Small bowel perforation     Hypotension due to medication     Prostate cancer screening     Mass of soft tissue of left lower extremity     Essential hypertension     Acute pain of left knee     Chronic idiopathic gout involving toe of left foot without tophus  Lab Results       Component                Value               Date                       WBC                      11.18               11/19/2020                 HGB                      14.1                11/19/2020                 HCT                      44.4                11/19/2020                 PLT                      296                 11/19/2020                 CHOL                     214 (H)             12/27/2023                 TRIG                     107                 12/27/2023                 HDL                      54                  12/27/2023                 ALT                      17                  12/27/2023                 AST                      19                  12/27/2023                 NA                       138                 12/27/2023                 K                        4.1                 12/27/2023                 CL                       107                 12/27/2023                 CREATININE               1.0                 12/27/2023                 BUN                      12                   12/27/2023                 CO2                      24                  12/27/2023                 TSH                      1.450               05/27/2020                 PSA                      1.83                10/05/2023                 INR                      1.1                 11/18/2020                 HGBA1C                   5.4                 07/21/2022                    Finger Pain  This is a chronic (2 months) problem. The current episode started more than 1 month ago. The problem has been gradually worsening. Pertinent negatives include no arthralgias, chest pain, headaches, joint swelling, neck pain, vomiting or weakness.       Allergies and Medications:   Review of patient's allergies indicates:  No Known Allergies  Current Outpatient Medications   Medication Sig Dispense Refill    ascorbic acid, vitamin C, (VITAMIN C) 1000 MG tablet Take by mouth once daily.      biotin 5,000 mcg TbDL Take 5,000 mcg by mouth once daily.       cranberry 500 mg Cap Take by mouth.      enalapril (VASOTEC) 10 MG tablet Take 1 tablet (10 mg total) by mouth once daily. 90 tablet 1    garlic Tab Take by mouth.      glucosamine &chondroit-mv-min3 047-033-20-0.5 mg Tab Take 1 tablet by mouth once daily.      omega-3 fatty acids/fish oil (FISH OIL-OMEGA-3 FATTY ACIDS) 300-1,000 mg capsule Take 2 capsules by mouth once daily.      saw palmetto 160 MG capsule Take 160 mg by mouth 2 (two) times daily.       No current facility-administered medications for this visit.       Family History:   Family History   Problem Relation Name Age of Onset    Hypertension Mother      Diabetes Mother      No Known Problems Father      No Known Problems Brother      No Known Problems Brother      No Known Problems Brother      No Known Problems Brother      No Known Problems Brother         Social History:   Social History     Socioeconomic History    Marital status:    Tobacco Use    Smoking status: Never    Smokeless  tobacco: Never   Substance and Sexual Activity    Alcohol use: No    Drug use: No    Sexual activity: Not Currently     Social Determinants of Health     Financial Resource Strain: Low Risk  (12/27/2023)    Overall Financial Resource Strain (CARDIA)     Difficulty of Paying Living Expenses: Not hard at all   Food Insecurity: No Food Insecurity (12/27/2023)    Hunger Vital Sign     Worried About Running Out of Food in the Last Year: Never true     Ran Out of Food in the Last Year: Never true   Transportation Needs: No Transportation Needs (12/27/2023)    PRAPARE - Transportation     Lack of Transportation (Medical): No     Lack of Transportation (Non-Medical): No   Physical Activity: Sufficiently Active (12/27/2023)    Exercise Vital Sign     Days of Exercise per Week: 5 days     Minutes of Exercise per Session: 30 min   Stress: No Stress Concern Present (12/27/2023)    [place] of Occupational Health - Occupational Stress Questionnaire     Feeling of Stress : Not at all   Housing Stability: Low Risk  (12/27/2023)    Housing Stability Vital Sign     Unable to Pay for Housing in the Last Year: No     Number of Places Lived in the Last Year: 1     Unstable Housing in the Last Year: No       Review of Systems   Constitutional:  Negative for activity change and unexpected weight change.   HENT:  Negative for hearing loss, rhinorrhea and trouble swallowing.    Eyes:  Negative for discharge and visual disturbance.   Respiratory:  Negative for chest tightness and wheezing.    Cardiovascular:  Negative for chest pain and palpitations.   Gastrointestinal:  Negative for blood in stool, constipation, diarrhea and vomiting.   Endocrine: Negative for polydipsia and polyuria.   Genitourinary:  Negative for difficulty urinating, hematuria and urgency.   Musculoskeletal:  Negative for arthralgias, joint swelling and neck pain.   Neurological:  Negative for weakness and headaches.   Psychiatric/Behavioral:  Negative for  confusion and dysphoric mood.        Objective:     Vitals:    05/13/24 1000   BP: 104/70   Pulse: 82   Resp: 18   Temp: 97.4 °F (36.3 °C)        Physical Exam  Musculoskeletal:      Comments: The right 3rd finger has a 3 mm granulomatous or cystic lesion at the cuticle it is not actively bleeding or ulcerated.         Assessment:       1. Epidermoid cyst of finger of right hand        Plan:       Carlos was seen today for finger pain.    Diagnoses and all orders for this visit:    Epidermoid cyst of finger of right hand can not rule out an underlying malignancy or other lesion.  -     Ambulatory referral/consult to Dermatology; Future         Follow up in about 1 month (around 6/13/2024), or In June as scheduled.

## 2024-06-24 ENCOUNTER — PATIENT MESSAGE (OUTPATIENT)
Dept: FAMILY MEDICINE | Facility: CLINIC | Age: 69
End: 2024-06-24
Payer: MEDICARE

## 2024-06-24 DIAGNOSIS — E78.2 MIXED HYPERLIPIDEMIA: Primary | ICD-10-CM

## 2024-06-24 DIAGNOSIS — M1A.00X0 IDIOPATHIC CHRONIC GOUT WITHOUT TOPHUS, UNSPECIFIED SITE: ICD-10-CM

## 2024-06-26 ENCOUNTER — LAB VISIT (OUTPATIENT)
Dept: LAB | Facility: HOSPITAL | Age: 69
End: 2024-06-26
Attending: FAMILY MEDICINE
Payer: MEDICARE

## 2024-06-26 DIAGNOSIS — E78.2 MIXED HYPERLIPIDEMIA: ICD-10-CM

## 2024-06-26 DIAGNOSIS — M1A.00X0 IDIOPATHIC CHRONIC GOUT WITHOUT TOPHUS, UNSPECIFIED SITE: ICD-10-CM

## 2024-06-26 LAB
ALBUMIN SERPL BCP-MCNC: 3.8 G/DL (ref 3.5–5.2)
ALP SERPL-CCNC: 68 U/L (ref 55–135)
ALT SERPL W/O P-5'-P-CCNC: 30 U/L (ref 10–44)
ANION GAP SERPL CALC-SCNC: 11 MMOL/L (ref 8–16)
AST SERPL-CCNC: 27 U/L (ref 10–40)
BILIRUB SERPL-MCNC: 0.7 MG/DL (ref 0.1–1)
BUN SERPL-MCNC: 13 MG/DL (ref 8–23)
CALCIUM SERPL-MCNC: 9.4 MG/DL (ref 8.7–10.5)
CHLORIDE SERPL-SCNC: 109 MMOL/L (ref 95–110)
CHOLEST SERPL-MCNC: 214 MG/DL (ref 120–199)
CHOLEST/HDLC SERPL: 4.1 {RATIO} (ref 2–5)
CO2 SERPL-SCNC: 21 MMOL/L (ref 23–29)
CREAT SERPL-MCNC: 1.1 MG/DL (ref 0.5–1.4)
EST. GFR  (NO RACE VARIABLE): >60 ML/MIN/1.73 M^2
GLUCOSE SERPL-MCNC: 87 MG/DL (ref 70–110)
HDLC SERPL-MCNC: 52 MG/DL (ref 40–75)
HDLC SERPL: 24.3 % (ref 20–50)
LDLC SERPL CALC-MCNC: 143.6 MG/DL (ref 63–159)
NONHDLC SERPL-MCNC: 162 MG/DL
POTASSIUM SERPL-SCNC: 3.8 MMOL/L (ref 3.5–5.1)
PROT SERPL-MCNC: 6.9 G/DL (ref 6–8.4)
SODIUM SERPL-SCNC: 141 MMOL/L (ref 136–145)
TRIGL SERPL-MCNC: 92 MG/DL (ref 30–150)
URATE SERPL-MCNC: 7.2 MG/DL (ref 3.4–7)

## 2024-06-26 PROCEDURE — 80061 LIPID PANEL: CPT | Performed by: FAMILY MEDICINE

## 2024-06-26 PROCEDURE — 80053 COMPREHEN METABOLIC PANEL: CPT | Performed by: FAMILY MEDICINE

## 2024-06-26 PROCEDURE — 84550 ASSAY OF BLOOD/URIC ACID: CPT | Performed by: FAMILY MEDICINE

## 2024-06-26 PROCEDURE — 36415 COLL VENOUS BLD VENIPUNCTURE: CPT | Performed by: FAMILY MEDICINE

## 2024-06-27 ENCOUNTER — TELEPHONE (OUTPATIENT)
Dept: FAMILY MEDICINE | Facility: CLINIC | Age: 69
End: 2024-06-27
Payer: MEDICARE

## 2024-06-27 DIAGNOSIS — M1A.00X0 IDIOPATHIC CHRONIC GOUT WITHOUT TOPHUS, UNSPECIFIED SITE: Primary | ICD-10-CM

## 2024-06-27 RX ORDER — ALLOPURINOL 100 MG/1
100 TABLET ORAL DAILY
Qty: 30 TABLET | Refills: 11 | Status: SHIPPED | OUTPATIENT
Start: 2024-06-27 | End: 2025-06-27

## 2024-06-27 NOTE — TELEPHONE ENCOUNTER
----- Message from Dick Mendez MD sent at 6/27/2024  7:54 AM CDT -----  The uric acid is still mildly elevated.  Would recommend low-dose allopurinol.  Also statins would be indicated since the LDL is still above 130.  If you willing I can send in a prescription for pravastatin and allopurinol

## 2024-06-27 NOTE — PROGRESS NOTES
The uric acid is still mildly elevated.  Would recommend low-dose allopurinol.  Also statins would be indicated since the LDL is still above 130.  If you willing I can send in a prescription for pravastatin and allopurinol

## 2024-06-28 ENCOUNTER — OFFICE VISIT (OUTPATIENT)
Dept: FAMILY MEDICINE | Facility: CLINIC | Age: 69
End: 2024-06-28
Payer: MEDICARE

## 2024-06-28 VITALS
RESPIRATION RATE: 18 BRPM | OXYGEN SATURATION: 99 % | HEIGHT: 71 IN | HEART RATE: 54 BPM | TEMPERATURE: 98 F | BODY MASS INDEX: 23.8 KG/M2 | DIASTOLIC BLOOD PRESSURE: 72 MMHG | SYSTOLIC BLOOD PRESSURE: 116 MMHG | WEIGHT: 170 LBS

## 2024-06-28 DIAGNOSIS — R73.01 IMPAIRED FASTING BLOOD SUGAR: ICD-10-CM

## 2024-06-28 DIAGNOSIS — E78.2 MIXED HYPERLIPIDEMIA: Primary | ICD-10-CM

## 2024-06-28 PROCEDURE — 99213 OFFICE O/P EST LOW 20 MIN: CPT | Mod: PBBFAC,PN | Performed by: FAMILY MEDICINE

## 2024-06-28 PROCEDURE — 99999 PR PBB SHADOW E&M-EST. PATIENT-LVL III: CPT | Mod: PBBFAC,,, | Performed by: FAMILY MEDICINE

## 2024-06-28 RX ORDER — EZETIMIBE 10 MG/1
10 TABLET ORAL DAILY
Qty: 90 TABLET | Refills: 3 | Status: SHIPPED | OUTPATIENT
Start: 2024-06-28 | End: 2025-06-28

## 2024-06-28 NOTE — PROGRESS NOTES
Subjective:       Patient ID: Carlos Saravia is a 68 y.o. male.    Chief Complaint: Hypertension and Hyperlipidemia      Pt is here for f/u HPT  HLD.  BP Readings from Last 3 Encounters:  06/28/24 : 116/72  05/13/24 : 104/70  12/28/23 : 130/78  Lab Results       Component                Value               Date                       WBC                      11.18               11/19/2020                 HGB                      14.1                11/19/2020                 HCT                      44.4                11/19/2020                 PLT                      296                 11/19/2020                 CHOL                     214 (H)             06/26/2024                 TRIG                     92                  06/26/2024                 HDL                      52                  06/26/2024                 ALT                      30                  06/26/2024                 AST                      27                  06/26/2024                 NA                       141                 06/26/2024                 K                        3.8                 06/26/2024                 CL                       109                 06/26/2024                 CREATININE               1.1                 06/26/2024                 BUN                      13                  06/26/2024                 CO2                      21 (L)              06/26/2024                 TSH                      1.450               05/27/2020                 PSA                      1.83                10/05/2023                 INR                      1.1                 11/18/2020                 HGBA1C                   5.4                 07/21/2022            Lab Results       Component                Value               Date                       CHOL                     214 (H)             06/26/2024                 CHOL                     214 (H)             12/27/2023                 CHOL                      252 (H)             10/05/2023                 CHOL                     252 (H)             10/05/2023            Lab Results       Component                Value               Date                       HDL                      52                  06/26/2024                 HDL                      54                  12/27/2023                 HDL                      58                  10/05/2023                 HDL                      58                  10/05/2023            Lab Results       Component                Value               Date                       LDLCALC                  143.6               06/26/2024                 LDLCALC                  138.6               12/27/2023                 LDLCALC                  180.4 (H)           10/05/2023                 LDLCALC                  180.4 (H)           10/05/2023            Lab Results       Component                Value               Date                       TRIG                     92                  06/26/2024                 TRIG                     107                 12/27/2023                 TRIG                     68                  10/05/2023                 TRIG                     68                  10/05/2023              Lab Results       Component                Value               Date                       CHOLHDL                  24.3                06/26/2024                 CHOLHDL                  25.2                12/27/2023                 CHOLHDL                  23.0                10/05/2023                 CHOLHDL                  23.0                10/05/2023                  Hypertension  Pertinent negatives include no chest pain, headaches, neck pain or palpitations.   Hyperlipidemia  Pertinent negatives include no chest pain.       Allergies and Medications:   Review of patient's allergies indicates:   Allergen Reactions    Statins-hmg-coa reductase inhibitors Swelling     Current Outpatient  Medications   Medication Sig Dispense Refill    ascorbic acid, vitamin C, (VITAMIN C) 1000 MG tablet Take by mouth once daily.      biotin 5,000 mcg TbDL Take 5,000 mcg by mouth once daily.       cranberry 500 mg Cap Take by mouth.      enalapril (VASOTEC) 10 MG tablet Take 1 tablet (10 mg total) by mouth once daily. 90 tablet 1    garlic Tab Take by mouth.      glucosamine &chondroit-mv-min3 301-836-09-0.5 mg Tab Take 1 tablet by mouth once daily.      omega-3 fatty acids/fish oil (FISH OIL-OMEGA-3 FATTY ACIDS) 300-1,000 mg capsule Take 2 capsules by mouth once daily.      saw palmetto 160 MG capsule Take 160 mg by mouth 2 (two) times daily.      allopurinoL (ZYLOPRIM) 100 MG tablet Take 1 tablet (100 mg total) by mouth once daily. (Patient not taking: Reported on 6/28/2024) 30 tablet 11    ezetimibe (ZETIA) 10 mg tablet Take 1 tablet (10 mg total) by mouth once daily. 90 tablet 3     No current facility-administered medications for this visit.       Family History:   Family History   Problem Relation Name Age of Onset    Hypertension Mother      Diabetes Mother      No Known Problems Father      No Known Problems Brother      No Known Problems Brother      No Known Problems Brother      No Known Problems Brother      No Known Problems Brother         Social History:   Social History     Socioeconomic History    Marital status:    Tobacco Use    Smoking status: Never    Smokeless tobacco: Never   Substance and Sexual Activity    Alcohol use: No    Drug use: No    Sexual activity: Not Currently     Social Determinants of Health     Financial Resource Strain: Low Risk  (12/27/2023)    Overall Financial Resource Strain (CARDIA)     Difficulty of Paying Living Expenses: Not hard at all   Food Insecurity: No Food Insecurity (12/27/2023)    Hunger Vital Sign     Worried About Running Out of Food in the Last Year: Never true     Ran Out of Food in the Last Year: Never true   Transportation Needs: No Transportation  Needs (12/27/2023)    PRAPARE - Transportation     Lack of Transportation (Medical): No     Lack of Transportation (Non-Medical): No   Physical Activity: Sufficiently Active (12/27/2023)    Exercise Vital Sign     Days of Exercise per Week: 5 days     Minutes of Exercise per Session: 30 min   Stress: No Stress Concern Present (12/27/2023)    [employer] - Occupational Stress Questionnaire     Feeling of Stress : Not at all   Housing Stability: Low Risk  (12/27/2023)    Housing Stability Vital Sign     Unable to Pay for Housing in the Last Year: No     Number of Places Lived in the Last Year: 1     Unstable Housing in the Last Year: No       Review of Systems   Constitutional:  Negative for activity change and unexpected weight change.   HENT:  Negative for hearing loss, rhinorrhea and trouble swallowing.    Eyes:  Negative for discharge and visual disturbance.   Respiratory:  Negative for chest tightness and wheezing.    Cardiovascular:  Negative for chest pain and palpitations.   Gastrointestinal:  Negative for blood in stool, constipation, diarrhea and vomiting.   Endocrine: Negative for polydipsia and polyuria.   Genitourinary:  Negative for difficulty urinating, hematuria and urgency.   Musculoskeletal:  Negative for arthralgias, joint swelling and neck pain.   Neurological:  Negative for weakness and headaches.   Psychiatric/Behavioral:  Negative for confusion and dysphoric mood.        Objective:     Vitals:    06/28/24 0915   BP: 116/72   Pulse: (!) 54   Resp: 18   Temp: 97.7 °F (36.5 °C)        Physical Exam  Vitals and nursing note reviewed.   Constitutional:       Appearance: He is well-developed. He is not diaphoretic.   HENT:      Head: Normocephalic.   Eyes:      Conjunctiva/sclera: Conjunctivae normal.      Pupils: Pupils are equal, round, and reactive to light.   Cardiovascular:      Rate and Rhythm: Normal rate and regular rhythm.      Heart sounds: Normal heart sounds.  No murmur heard.     No friction rub. No gallop.   Pulmonary:      Effort: Pulmonary effort is normal. No respiratory distress.      Breath sounds: Normal breath sounds. No stridor. No wheezing or rales.   Chest:      Chest wall: No tenderness.   Skin:     General: Skin is warm and dry.   Psychiatric:         Behavior: Behavior normal.         Thought Content: Thought content normal.         Judgment: Judgment normal.         Assessment:       1. Mixed hyperlipidemia    2. Impaired fasting blood sugar        Plan:       Carlos was seen today for hypertension and hyperlipidemia.    Diagnoses and all orders for this visit:    Mixed hyperlipidemia  -     ezetimibe (ZETIA) 10 mg tablet; Take 1 tablet (10 mg total) by mouth once daily.  -     Lipid Panel; Future  -     Comprehensive Metabolic Panel; Future    Impaired fasting blood sugar  -     Hemoglobin A1C; Future         Follow up in about 6 months (around 12/28/2024) for follow up HTN, follow up cholesterol.

## 2024-12-20 DIAGNOSIS — I10 ESSENTIAL HYPERTENSION: ICD-10-CM

## 2024-12-22 RX ORDER — ENALAPRIL MALEATE 10 MG/1
10 TABLET ORAL
Qty: 90 TABLET | Refills: 0 | Status: SHIPPED | OUTPATIENT
Start: 2024-12-22

## 2024-12-27 ENCOUNTER — LAB VISIT (OUTPATIENT)
Dept: LAB | Facility: HOSPITAL | Age: 69
End: 2024-12-27
Attending: FAMILY MEDICINE
Payer: MEDICARE

## 2024-12-27 ENCOUNTER — TELEPHONE (OUTPATIENT)
Dept: FAMILY MEDICINE | Facility: CLINIC | Age: 69
End: 2024-12-27
Payer: MEDICARE

## 2024-12-27 DIAGNOSIS — R73.01 IMPAIRED FASTING GLUCOSE: ICD-10-CM

## 2024-12-27 DIAGNOSIS — E78.2 MIXED HYPERLIPIDEMIA: Primary | ICD-10-CM

## 2024-12-27 LAB
ALBUMIN SERPL BCP-MCNC: 4.5 G/DL (ref 3.5–5.2)
ALP SERPL-CCNC: 63 U/L (ref 55–135)
ALT SERPL W/O P-5'-P-CCNC: 17 U/L (ref 10–44)
ANION GAP SERPL CALC-SCNC: 8 MMOL/L (ref 8–16)
AST SERPL-CCNC: 21 U/L (ref 10–40)
BILIRUB SERPL-MCNC: 0.6 MG/DL (ref 0.1–1)
BUN SERPL-MCNC: 17 MG/DL (ref 8–23)
CALCIUM SERPL-MCNC: 9.7 MG/DL (ref 8.7–10.5)
CHLORIDE SERPL-SCNC: 109 MMOL/L (ref 95–110)
CHOLEST SERPL-MCNC: 211 MG/DL (ref 120–199)
CHOLEST/HDLC SERPL: 4.3 {RATIO} (ref 2–5)
CO2 SERPL-SCNC: 24 MMOL/L (ref 23–29)
CREAT SERPL-MCNC: 1 MG/DL (ref 0.5–1.4)
EST. GFR  (NO RACE VARIABLE): >60 ML/MIN/1.73 M^2
ESTIMATED AVG GLUCOSE: 123 MG/DL (ref 68–131)
GLUCOSE SERPL-MCNC: 86 MG/DL (ref 70–110)
HBA1C MFR BLD: 5.9 % (ref 4.5–6.2)
HDLC SERPL-MCNC: 49 MG/DL (ref 40–75)
HDLC SERPL: 23.2 % (ref 20–50)
LDLC SERPL CALC-MCNC: 138.8 MG/DL (ref 63–159)
NONHDLC SERPL-MCNC: 162 MG/DL
POTASSIUM SERPL-SCNC: 4.4 MMOL/L (ref 3.5–5.1)
PROT SERPL-MCNC: 7.1 G/DL (ref 6–8.4)
SODIUM SERPL-SCNC: 141 MMOL/L (ref 136–145)
TRIGL SERPL-MCNC: 116 MG/DL (ref 30–150)

## 2024-12-27 PROCEDURE — 83036 HEMOGLOBIN GLYCOSYLATED A1C: CPT | Performed by: FAMILY MEDICINE

## 2024-12-27 PROCEDURE — 36415 COLL VENOUS BLD VENIPUNCTURE: CPT | Performed by: FAMILY MEDICINE

## 2024-12-27 PROCEDURE — 80061 LIPID PANEL: CPT | Performed by: FAMILY MEDICINE

## 2024-12-27 PROCEDURE — 80053 COMPREHEN METABOLIC PANEL: CPT | Performed by: FAMILY MEDICINE

## 2024-12-27 NOTE — TELEPHONE ENCOUNTER
----- Message from Dick Mendez MD sent at 12/27/2024 11:41 AM CST -----  Cholesterol is essentially unchanged follow-up as scheduled.

## 2024-12-30 ENCOUNTER — OFFICE VISIT (OUTPATIENT)
Dept: FAMILY MEDICINE | Facility: CLINIC | Age: 69
End: 2024-12-30
Payer: MEDICARE

## 2024-12-30 VITALS
WEIGHT: 176 LBS | SYSTOLIC BLOOD PRESSURE: 126 MMHG | OXYGEN SATURATION: 97 % | RESPIRATION RATE: 18 BRPM | HEIGHT: 71 IN | TEMPERATURE: 98 F | DIASTOLIC BLOOD PRESSURE: 70 MMHG | HEART RATE: 64 BPM | BODY MASS INDEX: 24.64 KG/M2

## 2024-12-30 DIAGNOSIS — Z12.5 ENCOUNTER FOR PROSTATE CANCER SCREENING: ICD-10-CM

## 2024-12-30 DIAGNOSIS — I70.0 AORTIC ATHEROSCLEROSIS: ICD-10-CM

## 2024-12-30 DIAGNOSIS — E78.5 HYPERLIPIDEMIA, UNSPECIFIED HYPERLIPIDEMIA TYPE: Primary | ICD-10-CM

## 2024-12-30 DIAGNOSIS — I10 PRIMARY HYPERTENSION: ICD-10-CM

## 2024-12-30 DIAGNOSIS — I10 ESSENTIAL HYPERTENSION: ICD-10-CM

## 2024-12-30 DIAGNOSIS — R73.01 IMPAIRED FASTING BLOOD SUGAR: ICD-10-CM

## 2024-12-30 DIAGNOSIS — N52.9 ERECTILE DYSFUNCTION, UNSPECIFIED ERECTILE DYSFUNCTION TYPE: ICD-10-CM

## 2024-12-30 PROCEDURE — 99215 OFFICE O/P EST HI 40 MIN: CPT | Mod: PBBFAC,PN | Performed by: FAMILY MEDICINE

## 2024-12-30 PROCEDURE — 99999 PR PBB SHADOW E&M-EST. PATIENT-LVL V: CPT | Mod: PBBFAC,,, | Performed by: FAMILY MEDICINE

## 2024-12-30 PROCEDURE — 99214 OFFICE O/P EST MOD 30 MIN: CPT | Mod: S$PBB,AQ,, | Performed by: FAMILY MEDICINE

## 2024-12-30 RX ORDER — SILDENAFIL 100 MG/1
100 TABLET, FILM COATED ORAL DAILY PRN
Qty: 20 TABLET | Refills: 3 | Status: SHIPPED | OUTPATIENT
Start: 2024-12-30 | End: 2025-12-30

## 2024-12-30 NOTE — PROGRESS NOTES
Subjective:       Patient ID: Carlos Saravia is a 69 y.o. male.    Chief Complaint: Hypertension and Hyperlipidemia    BP Readings from Last 3 Encounters:   12/30/24 126/70   06/28/24 116/72   05/13/24 104/70     Lab Results   Component Value Date    WBC 11.18 11/19/2020    HGB 14.1 11/19/2020    HCT 44.4 11/19/2020     11/19/2020    CHOL 211 (H) 12/27/2024    TRIG 116 12/27/2024    HDL 49 12/27/2024    ALT 17 12/27/2024    AST 21 12/27/2024     12/27/2024    K 4.4 12/27/2024     12/27/2024    CREATININE 1.0 12/27/2024    BUN 17 12/27/2024    CO2 24 12/27/2024    TSH 1.450 05/27/2020    PSA 1.83 10/05/2023    INR 1.1 11/18/2020    HGBA1C 5.9 12/27/2024     Lab Results   Component Value Date    CHOL 211 (H) 12/27/2024-patient is taking fish oil and garlic.Refuses statain b/c side effects.    CHOL 214 (H) 06/26/2024    CHOL 214 (H) 12/27/2023     Lab Results   Component Value Date    HDL 49 12/27/2024    HDL 52 06/26/2024    HDL 54 12/27/2023     Lab Results   Component Value Date    LDLCALC 138.8 12/27/2024    LDLCALC 143.6 06/26/2024    LDLCALC 138.6 12/27/2023     Lab Results   Component Value Date    TRIG 116 12/27/2024    TRIG 92 06/26/2024    TRIG 107 12/27/2023       Lab Results   Component Value Date    CHOLHDL 23.2 12/27/2024    CHOLHDL 24.3 06/26/2024    CHOLHDL 25.2 12/27/2023             History of Present Illness    CHIEF COMPLAINT:  Mr. Saravia presents for a follow-up visit to discuss recent lab results and overall health status.    HPI:  Mr. Saravia's recent lab work shows cholesterol of 211, with favorable triglycerides and HDL. LDL cholesterol is 138, slightly elevated but within acceptable range, representing a decrease from 143 six months ago and significant improvement from 180 in October 2023. Mr. Saravia reports adverse reactions to statin medications, including diarrhea, palpitations, and myalgia, leading to discontinuation. As an alternative, patient has been taking fish oil  and garlic supplements, which appear to have helped stabilize and slightly lower cholesterol levels. Mr. Saravia also mentions dietary modifications.    An angiogram 4-5 years ago showed no plaque or blockages in coronary vessels. At that time, a medical professional suggested possible blood hyperviscosity, leading to initiation of fish oil supplementation.    A1c was in the pre-diabetic range.    Mr. Saravia reports erectile dysfunction (ED) and requests a prescription for sildenafil, which he has used successfully in the past. Last prescription was 2-3 years ago.    Mr. Saravia denies any issues with blood pressure, coronary disease, or blockages.    MEDICATIONS:  Mr. Saravia is on fish oil for blood thinning and a garlic supplement.    MEDICAL HISTORY:  Mr. Saravia has a history of gout, a prostate condition, and pre-diabetes. Mr. Saravia has undergone a hysterectomy.    FAMILY HISTORY:  Family history is significant for mother with diabetes and hypertension. On both his father's and mother's side, uncles have a history of diabetes and hypertension.    SURGICAL HISTORY:  Mr. Saravia underwent an angiogram 4-5 years ago, which showed no plaque or blockages in the coronary vessels.    TEST RESULTS:  In December 2023, the patient's cholesterol was 211, with good triglycerides and HDL levels. His LDL levels were 138 in December 2023, 180 in October 2023, 143 six months prior, and 138 one year ago. Mr. Saravia's PSA in October 2023 was 1.83. A recent A1c test showed results in the pre-diabetic range.    IMAGING:  An angiogram performed 4-5 years ago revealed no plaque or blockages, with clear vessels.    SOCIAL HISTORY:  Mr. Saravia is  and identifies as [place].      ROS:  Cardiovascular: +palpitations  Gastrointestinal: +diarrhea  Male Genitourinary: +erectile dysfunction          Allergies and Medications:   Review of patient's allergies indicates:  No Active Allergies  Current Outpatient Medications   Medication Sig  Dispense Refill    ascorbic acid, vitamin C, (VITAMIN C) 1000 MG tablet Take by mouth once daily.      biotin 5,000 mcg TbDL Take 5,000 mcg by mouth once daily.       cranberry 500 mg Cap Take by mouth.      enalapril (VASOTEC) 10 MG tablet Take 1 tablet by mouth once daily 90 tablet 0    garlic Tab Take by mouth.      glucosamine &chondroit-mv-min3 536-688-21-0.5 mg Tab Take 1 tablet by mouth once daily.      omega-3 fatty acids/fish oil (FISH OIL-OMEGA-3 FATTY ACIDS) 300-1,000 mg capsule Take 2 capsules by mouth once daily.      saw palmetto 160 MG capsule Take 160 mg by mouth 2 (two) times daily.      sildenafiL (VIAGRA) 100 MG tablet Take 1 tablet (100 mg total) by mouth daily as needed for Erectile Dysfunction. 20 tablet 3     No current facility-administered medications for this visit.       Family History:   Family History   Problem Relation Name Age of Onset    Hypertension Mother      Diabetes Mother      No Known Problems Father      No Known Problems Brother      No Known Problems Brother      No Known Problems Brother      No Known Problems Brother      No Known Problems Brother         Social History:   Social History     Socioeconomic History    Marital status:    Tobacco Use    Smoking status: Never    Smokeless tobacco: Never   Substance and Sexual Activity    Alcohol use: No    Drug use: No    Sexual activity: Not Currently     Social Drivers of Health     Financial Resource Strain: Low Risk  (12/27/2023)    Overall Financial Resource Strain (CARDIA)     Difficulty of Paying Living Expenses: Not hard at all   Food Insecurity: No Food Insecurity (12/27/2023)    Hunger Vital Sign     Worried About Running Out of Food in the Last Year: Never true     Ran Out of Food in the Last Year: Never true   Transportation Needs: No Transportation Needs (12/27/2023)    PRAPARE - Transportation     Lack of Transportation (Medical): No     Lack of Transportation (Non-Medical): No   Physical Activity:  Sufficiently Active (12/27/2023)    Exercise Vital Sign     Days of Exercise per Week: 5 days     Minutes of Exercise per Session: 30 min   Stress: No Stress Concern Present (12/27/2023)    [employer] - Occupational Stress Questionnaire     Feeling of Stress : Not at all   Housing Stability: Low Risk  (12/27/2023)    Housing Stability Vital Sign     Unable to Pay for Housing in the Last Year: No     Number of Places Lived in the Last Year: 1     Unstable Housing in the Last Year: No           Objective:     Vitals:    12/30/24 0948   BP: 126/70   Pulse: 64   Resp: 18   Temp: 98.4 °F (36.9 °C)        Physical Exam    General: No acute distress. Well-developed. Well-nourished.  Eyes: EOMI. Sclerae anicteric.  HENT: Normocephalic. Atraumatic. Nares patent. Moist oral mucosa.  Cardiovascular: Regular rate. Regular rhythm. No murmurs. No rubs. No gallops. Normal S1, S2.  Respiratory: Normal respiratory effort. Clear to auscultation bilaterally. No rales. No rhonchi. No wheezing.  Musculoskeletal: No  obvious deformity.  Extremities: No lower extremity edema.  Neurological: Alert & oriented x3. No slurred speech. Normal gait.  Psychiatric: Normal mood. Normal affect. Good insight. Good judgment.  Skin: Warm. Dry. No rash.            Assessment:       1. Hyperlipidemia, unspecified hyperlipidemia type    2. Essential hypertension    3. Primary hypertension    4. Aortic atherosclerosis    5. Impaired fasting blood sugar    6. Encounter for prostate cancer screening    7. Erectile dysfunction, unspecified erectile dysfunction type        Plan:       Assessment & Plan    IMPRESSION:  - Evaluated lipid panel results: total cholesterol 211, HDL and triglycerides good,  (down from 180 in October '23)  - Considered history of statin intolerance and clear coronary vessels on previous angiogram  - Assessed cardiovascular risk factors, including family history of diabetes and hypertension  -  Noted pre-diabetic A1c level, requiring monitoring  - Reviewed PSA history, last recorded at 1.83 over 1 year ago    MIXED HYPERLIPIDEMIA:  - Discussed importance of monitoring cholesterol levels, particularly LDL, despite clear coronary vessels.  - Explained potential benefits of statins for overall cardiovascular health, including protection of smaller vessels in brain and kidneys.  - Addressed limitations of angiograms in detecting plaque in smaller vessels.  - Continued fish oil and garlic supplements for cholesterol management.    ERECTILE DYSFUNCTION:  - Started Viagra 20 tablets for erectile dysfunction, as needed.    DIETARY COUNSELING:  - Mr. Saravia to continue Mediterranean diet, including fish and fresh vegetables.  - Mr. Saravia to maintain current efforts to improve eating habits.    CARDIOVASCULAR SCREENING:  - Comprehensive lab work ordered in 6 months, including lipid panel, A1c, and PSA.  - Recommend annual follow-up with cardiology nurse practitioner.    FOLLOW-UP:  - Follow up in 6 months (around end of June/early July) after completing lab work.        Carlos was seen today for hypertension and hyperlipidemia.    Diagnoses and all orders for this visit:    Hyperlipidemia, unspecified hyperlipidemia type  -     Ambulatory referral/consult to Cardiology; Future  -     Lipid Panel; Future  -     Comprehensive Metabolic Panel; Future    Essential hypertension    Primary hypertension  -     Microalbumin/Creatinine Ratio, Urine; Future    Aortic atherosclerosis    Impaired fasting blood sugar  -     Hemoglobin A1C; Future    Encounter for prostate cancer screening  -     PSA, Screening; Future    Erectile dysfunction, unspecified erectile dysfunction type  -     sildenafiL (VIAGRA) 100 MG tablet; Take 1 tablet (100 mg total) by mouth daily as needed for Erectile Dysfunction.         Follow up in about 6 months (around 6/30/2025) for follow up cholesterol, follow up HTN.  This note was generated with the  assistance of ambient listening technology. Verbal consent was obtained by the patient and accompanying visitor(s) for the recording of patient appointment to facilitate this note. I attest to having reviewed and edited the generated note for accuracy, though some syntax or spelling errors may persist. Please contact the author of this note for any clarification.     Answers submitted by the patient for this visit:  Review of Systems Questionnaire (Submitted on 12/30/2024)  activity change: No  unexpected weight change: No  neck pain: No  hearing loss: No  rhinorrhea: No  trouble swallowing: No  eye discharge: No  visual disturbance: No  chest tightness: No  wheezing: No  chest pain: No  palpitations: No  blood in stool: No  constipation: No  vomiting: No  diarrhea: No  polydipsia: No  polyuria: No  difficulty urinating: No  urgency: No  hematuria: No  joint swelling: No  arthralgias: No  headaches: No  weakness: No  confusion: No  dysphoric mood: No

## 2025-02-21 DIAGNOSIS — Z00.00 ENCOUNTER FOR MEDICARE ANNUAL WELLNESS EXAM: ICD-10-CM

## 2025-03-18 ENCOUNTER — TELEPHONE (OUTPATIENT)
Dept: UROLOGY | Facility: CLINIC | Age: 70
End: 2025-03-18
Payer: MEDICARE

## 2025-03-18 NOTE — TELEPHONE ENCOUNTER
----- Message from Dawna sent at 3/18/2025  9:58 AM CDT -----  Type:  Patient Returning Call Who Called:Pt   Who Left Message for Patient:Sandy  Does the patient know what this is regarding?:NA Would the patient rather a call back or a response via [facility]? Call Back  Best Call Back Number:[phone] Additional Information: PT was returning call he said he hit the wrong button     Please call Back to advise. Thanks!

## 2025-03-18 NOTE — TELEPHONE ENCOUNTER
----- Message from 49edj sent at 3/18/2025  9:09 AM CDT -----  Regarding: Needs sooner appt  Type:  Sooner Appointment RequestCaller is requesting a sooner appointment.  Caller declined first available appointment listed below.  Caller will not accept being placed on the waitlist and is requesting a message be sent to doctor.Name of Caller:  PtWhen is the first available appointment?  April 7thWould the patient rather a call back or a response via [facility]? Call [facility] Call Back Number:  [phone] Information:

## 2025-04-11 ENCOUNTER — PATIENT OUTREACH (OUTPATIENT)
Dept: ADMINISTRATIVE | Facility: HOSPITAL | Age: 70
End: 2025-04-11
Payer: MEDICARE

## 2025-04-11 ENCOUNTER — OFFICE VISIT (OUTPATIENT)
Dept: UROLOGY | Facility: CLINIC | Age: 70
End: 2025-04-11
Payer: MEDICARE

## 2025-04-11 VITALS — BODY MASS INDEX: 24.63 KG/M2 | WEIGHT: 175.94 LBS | HEIGHT: 71 IN

## 2025-04-11 DIAGNOSIS — N48.1 BALANITIS: ICD-10-CM

## 2025-04-11 DIAGNOSIS — Z12.5 SCREENING FOR PROSTATE CANCER: ICD-10-CM

## 2025-04-11 DIAGNOSIS — R35.0 URINARY FREQUENCY: Primary | ICD-10-CM

## 2025-04-11 DIAGNOSIS — N52.8 OTHER MALE ERECTILE DYSFUNCTION: ICD-10-CM

## 2025-04-11 LAB
BILIRUBIN, UA POC OHS: NEGATIVE
BLOOD, UA POC OHS: NEGATIVE
CLARITY, UA POC OHS: CLEAR
COLOR, UA POC OHS: YELLOW
GLUCOSE, UA POC OHS: NEGATIVE
KETONES, UA POC OHS: NEGATIVE
LEUKOCYTES, UA POC OHS: NEGATIVE
NITRITE, UA POC OHS: NEGATIVE
PH, UA POC OHS: 6
POC RESIDUAL URINE VOLUME: 8 ML (ref 0–100)
PROTEIN, UA POC OHS: NEGATIVE
SPECIFIC GRAVITY, UA POC OHS: 1.02
UROBILINOGEN, UA POC OHS: 0.2

## 2025-04-11 PROCEDURE — 99999 PR PBB SHADOW E&M-EST. PATIENT-LVL III: CPT | Mod: PBBFAC,,, | Performed by: UROLOGY

## 2025-04-11 PROCEDURE — 99213 OFFICE O/P EST LOW 20 MIN: CPT | Mod: PBBFAC,PO | Performed by: UROLOGY

## 2025-04-11 RX ORDER — TADALAFIL 5 MG/1
5 TABLET ORAL DAILY
Qty: 30 TABLET | Refills: 11 | Status: SHIPPED | OUTPATIENT
Start: 2025-04-11 | End: 2026-04-11

## 2025-04-11 RX ORDER — CLOTRIMAZOLE AND BETAMETHASONE DIPROPIONATE 10; .64 MG/G; MG/G
CREAM TOPICAL 2 TIMES DAILY
Qty: 45 G | Refills: 1 | Status: SHIPPED | OUTPATIENT
Start: 2025-04-11 | End: 2025-07-10

## 2025-04-11 RX ORDER — TADALAFIL 20 MG/1
20 TABLET ORAL DAILY PRN
Qty: 6 TABLET | Refills: 11 | Status: SHIPPED | OUTPATIENT
Start: 2025-04-11 | End: 2026-04-11

## 2025-04-11 NOTE — PROGRESS NOTES
[facility] New Patient/H&P:    Carlos Saravia is a 69 y.o. male who presents for lower urinary tract symptoms and erectile dysfunction.     Patient with a history of gout, HTN and lower urinary tract symptoms who presents to [facility] care.     On review of records, he was managed by Dr. Alvarez in the past with Flomax 0.4 mg PO daily.     He reports urinary frequency, mild straining and nocturia x 1. He stopped Flomax in 2021 as he states he had significant side effects including abdominal pain, muscle aches, chest pain and dizziness.     Urine dipstick negative today.     He reports severe ED that has been progressively worsening over the past several years. Previously used Viagra, but he discontinued the medication. Unable to obtain an erection sufficient for intercourse. Has a 30 degree dorsal curvature.     Also with a 1 month history of balanitis as he is uncircumcised.     Denies any fever, chills, gross hematuria, flank pain, bone pain, unintentional weight loss,  trauma or history of  malignancy.     IPSS QoL  6 4 4/11/25    PVR  8 mL  4/11/25    PSA  1.83  10/5/23    Testosterone  468  7/9/21    Past Medical History:   Diagnosis Date    Diverticulitis 11/18/2020    Gout     Hypertension     Kidney stone        Past Surgical History:   Procedure Laterality Date    adnoidectomy      ANGIOGRAM, CORONARY, WITH LEFT HEART CATHETERIZATION Left 5/27/2020    Procedure: Left heart cath;  Surgeon: Nick Kerr MD;  Location: [facility] CATH/EP LAB;  Service: Cardiology;  Laterality: Left;    APPENDECTOMY      COLON SURGERY      colon rescection benign polyp    tonsillectomy         Family History   Problem Relation Name Age of Onset    Hypertension Mother      Diabetes Mother      No Known Problems Father      No Known Problems Brother      No Known Problems Brother      No Known Problems Brother      No Known Problems Brother      No Known Problems Brother         Review of patient's  "allergies indicates:  No Known Allergies    Medications Reviewed: see MAR      FOCUSED PHYSICAL EXAM:    There were no vitals filed for this visit.  Body mass index is 24.54 kg/m². Weight: 79.8 kg (175 lb 14.8 oz) Height: 5' 11" (180.3 cm)       General: Alert, cooperative, no distress, appears stated age  Abdomen: Soft, non-tender, no CVA tenderness, non-distended  : Circumcised, normal phallus without plaques/lesions, bilaterally descended testicles without lesions, balanitis        LABS:    Recent Results (from the past 2 weeks)   POCT Bladder Scan    Collection Time: 04/11/25  9:45 AM   Result Value Ref Range    POC Residual Urine Volume 8 0 - 100 mL   POCT Urinalysis(Instrument)    Collection Time: 04/11/25  9:50 AM   Result Value Ref Range    Color, POC UA Yellow Yellow, Straw, Colorless    Clarity, POC UA Clear Clear    Glucose, POC UA Negative Negative    Bilirubin, POC UA Negative Negative    Ketones, POC UA Negative Negative    Spec Grav POC UA 1.020 1.005 - 1.030    Blood, POC UA Negative Negative    pH, POC UA 6.0 5.0 - 8.0    Protein, POC UA Negative Negative    Urobilinogen, POC UA 0.2 <=1.0    Nitrite, POC UA Negative Negative    WBC, POC UA Negative Negative         Assessment/Diagnosis:    1. Urinary frequency  POCT Bladder Scan    POCT Urinalysis(Instrument)    tadalafiL (CIALIS) 5 MG tablet      2. Screening for prostate cancer  PSA, Screening      3. Other male erectile dysfunction  Testosterone    tadalafiL (CIALIS) 5 MG tablet    tadalafiL (CIALIS) 20 MG Tab      4. Balanitis            Plans:    - I spent 45 minutes of the day of this encounter preparing for, treating and managing this patient. Extensive discussion with patient regarding the etiology and management of his lower urinary tract symptoms. Explained that LUTS are multifactorial and can be secondary to an enlarged prostate, PO intake of bladder irritants, overactive bladder, constipation, malignancy, trauma, infection, stones or " medications.   - RX for Cialis 5 mg PO daily for his LUTS and ED.   - RX for Cialis 20 mg as needed for ED.   - PSA and testosterone next available.   - Lotrisone RX for balanitis.   - RTC in 6 months with symptom score.

## 2025-04-15 ENCOUNTER — LAB VISIT (OUTPATIENT)
Dept: LAB | Facility: HOSPITAL | Age: 70
End: 2025-04-15
Attending: UROLOGY
Payer: MEDICARE

## 2025-04-15 DIAGNOSIS — Z12.5 SCREENING FOR PROSTATE CANCER: ICD-10-CM

## 2025-04-15 DIAGNOSIS — N52.8 OTHER MALE ERECTILE DYSFUNCTION: ICD-10-CM

## 2025-04-15 LAB — PSA SERPL-MCNC: 2.22 NG/ML (ref ?–4)

## 2025-04-15 PROCEDURE — 84403 ASSAY OF TOTAL TESTOSTERONE: CPT

## 2025-04-15 PROCEDURE — 36415 COLL VENOUS BLD VENIPUNCTURE: CPT

## 2025-04-15 PROCEDURE — 84153 ASSAY OF PSA TOTAL: CPT

## 2025-04-16 ENCOUNTER — TELEPHONE (OUTPATIENT)
Dept: FAMILY MEDICINE | Facility: CLINIC | Age: 70
End: 2025-04-16
Payer: MEDICARE

## 2025-04-16 LAB — TESTOST SERPL-MCNC: 503 NG/DL (ref 264–916)

## 2025-04-16 NOTE — TELEPHONE ENCOUNTER
----- Message from Nurse Wright sent at 4/11/2025  2:34 PM CDT -----  Regarding: Statin Allergy/Intolerance  The patient is showing as non-compliant on the Statin Therapy Measure.  Statin intolerance noted in documentation and/or problem list. Exclusions: Intolerance needs a 2025 code (myalgia, myositis, myopathy, or rhabdomyolysis) during the current measurement year AND problem list updated and reviewed IF APPLIES. G72.0  Drug-induced myopathy or rhabdomyolysis G72.9  Myopathy, [id number]  Myositis, [id number] Myalgia, unspecified siteExamples to place on problem list:Myalgia due to StatinMyopathy due to Statin

## 2025-04-16 NOTE — TELEPHONE ENCOUNTER
Mr. Saravia reports adverse reactions to statin medications, including diarrhea, palpitations, and myalgia, leading to discontinuation. As an alternative, patient has been taking fish oil and garlic supplements, which appear to have helped stabilize and slightly lower cholesterol levels. Mr. Saravia also mentions dietary modifications.

## 2025-04-22 ENCOUNTER — RESULTS FOLLOW-UP (OUTPATIENT)
Dept: UROLOGY | Facility: CLINIC | Age: 70
End: 2025-04-22

## 2025-06-09 DIAGNOSIS — I10 ESSENTIAL HYPERTENSION: ICD-10-CM

## 2025-06-09 RX ORDER — ENALAPRIL MALEATE 10 MG/1
10 TABLET ORAL
Qty: 90 TABLET | Refills: 1 | Status: SHIPPED | OUTPATIENT
Start: 2025-06-09

## 2025-06-09 NOTE — TELEPHONE ENCOUNTER
No care due was identified.  [facility] Embedded Care Due Messages. Reference number: [id number].   6/09/2025 10:08:41 AM CDT

## 2025-06-09 NOTE — TELEPHONE ENCOUNTER
Refill Routing Note   Medication(s) are not appropriate for processing by [facility] for the following reason(s):        Drug-disease interaction    [facility] action(s):  Defer      Medication Therapy Plan: Hypotension due to medication    Pharmacist review requested: Yes     Appointments  past 12m or future 3m with PCP    Date Provider   Last Visit   12/30/2024 Dick Mendez MD   Next Visit   6/30/2025 Dick Mendez MD   ED visits in past 90 days: 0        Note composed:12:20 PM 06/09/2025

## 2025-06-09 NOTE — TELEPHONE ENCOUNTER
Refill Decision Note   Carlos Saravia  is requesting a refill authorization.  Brief Assessment and Rationale for Refill:  Approve     Medication Therapy Plan:        Pharmacist review requested: Yes   Extended chart review required: Yes   Comments:     Note composed:12:52 PM 06/09/2025

## 2025-06-25 ENCOUNTER — LAB VISIT (OUTPATIENT)
Dept: LAB | Facility: HOSPITAL | Age: 70
End: 2025-06-25
Attending: FAMILY MEDICINE
Payer: MEDICARE

## 2025-06-25 ENCOUNTER — RESULTS FOLLOW-UP (OUTPATIENT)
Dept: FAMILY MEDICINE | Facility: CLINIC | Age: 70
End: 2025-06-25

## 2025-06-25 DIAGNOSIS — R73.01 IMPAIRED FASTING BLOOD SUGAR: ICD-10-CM

## 2025-06-25 DIAGNOSIS — I10 PRIMARY HYPERTENSION: ICD-10-CM

## 2025-06-25 DIAGNOSIS — Z12.5 ENCOUNTER FOR PROSTATE CANCER SCREENING: ICD-10-CM

## 2025-06-25 DIAGNOSIS — E78.5 HYPERLIPIDEMIA, UNSPECIFIED HYPERLIPIDEMIA TYPE: ICD-10-CM

## 2025-06-25 LAB
ALBUMIN SERPL-MCNC: 4.5 G/DL (ref 3.5–5.2)
ALBUMIN/CREAT UR: NORMAL
ALP SERPL-CCNC: 68 UNIT/L (ref 55–135)
ALT SERPL-CCNC: 34 UNIT/L (ref 10–44)
ANION GAP (SMH): 6 MMOL/L (ref 8–16)
AST SERPL-CCNC: 28 UNIT/L (ref 10–40)
BILIRUB SERPL-MCNC: 0.7 MG/DL (ref 0.1–1)
BUN SERPL-MCNC: 20 MG/DL (ref 8–23)
CALCIUM SERPL-MCNC: 9.5 MG/DL (ref 8.7–10.5)
CHLORIDE SERPL-SCNC: 107 MMOL/L (ref 95–110)
CHOLEST SERPL-MCNC: 248 MG/DL (ref 120–199)
CHOLEST/HDLC SERPL: 4.8 {RATIO} (ref 2–5)
CO2 SERPL-SCNC: 26 MMOL/L (ref 23–29)
CREAT SERPL-MCNC: 1.1 MG/DL (ref 0.5–1.4)
CREAT UR-MCNC: 130.8 MG/DL (ref 23–375)
GFR SERPLBLD CREATININE-BSD FMLA CKD-EPI: >60 ML/MIN/1.73/M2
GLUCOSE SERPL-MCNC: 92 MG/DL (ref 70–110)
HDLC SERPL-MCNC: 52 MG/DL (ref 40–75)
HDLC SERPL: 21 % (ref 20–50)
LDLC SERPL CALC-MCNC: 169.4 MG/DL (ref 63–159)
MICROALBUMIN UR-MCNC: <7 UG/ML
NONHDLC SERPL-MCNC: 196 MG/DL
POTASSIUM SERPL-SCNC: 4.5 MMOL/L (ref 3.5–5.1)
PROT SERPL-MCNC: 7.2 GM/DL (ref 6–8.4)
PSA SERPL-MCNC: 2.27 NG/ML (ref ?–4)
SODIUM SERPL-SCNC: 139 MMOL/L (ref 136–145)
TRIGL SERPL-MCNC: 133 MG/DL (ref 30–150)

## 2025-06-25 PROCEDURE — 36415 COLL VENOUS BLD VENIPUNCTURE: CPT

## 2025-06-25 PROCEDURE — 80061 LIPID PANEL: CPT

## 2025-06-25 PROCEDURE — 84153 ASSAY OF PSA TOTAL: CPT

## 2025-06-25 PROCEDURE — 82043 UR ALBUMIN QUANTITATIVE: CPT

## 2025-06-25 PROCEDURE — 80053 COMPREHEN METABOLIC PANEL: CPT

## 2025-06-27 ENCOUNTER — APPOINTMENT (OUTPATIENT)
Dept: LAB | Facility: HOSPITAL | Age: 70
End: 2025-06-27
Attending: FAMILY MEDICINE
Payer: MEDICARE

## 2025-06-27 LAB
EAG (SMH): 114 MG/DL (ref 68–131)
HBA1C MFR BLD: 5.6 % (ref 4.5–6.2)

## 2025-06-27 PROCEDURE — 36415 COLL VENOUS BLD VENIPUNCTURE: CPT

## 2025-06-27 PROCEDURE — 83036 HEMOGLOBIN GLYCOSYLATED A1C: CPT

## 2025-06-30 ENCOUNTER — OFFICE VISIT (OUTPATIENT)
Dept: FAMILY MEDICINE | Facility: CLINIC | Age: 70
End: 2025-06-30
Payer: MEDICARE

## 2025-06-30 VITALS
HEART RATE: 60 BPM | OXYGEN SATURATION: 99 % | DIASTOLIC BLOOD PRESSURE: 79 MMHG | WEIGHT: 175.06 LBS | TEMPERATURE: 98 F | SYSTOLIC BLOOD PRESSURE: 135 MMHG | BODY MASS INDEX: 24.51 KG/M2 | HEIGHT: 71 IN

## 2025-06-30 DIAGNOSIS — E78.2 MIXED HYPERLIPIDEMIA: ICD-10-CM

## 2025-06-30 DIAGNOSIS — I10 PRIMARY HYPERTENSION: ICD-10-CM

## 2025-06-30 DIAGNOSIS — N40.0 PROSTATE ENLARGEMENT: ICD-10-CM

## 2025-06-30 DIAGNOSIS — M1A.00X0 IDIOPATHIC CHRONIC GOUT WITHOUT TOPHUS, UNSPECIFIED SITE: ICD-10-CM

## 2025-06-30 DIAGNOSIS — I10 ESSENTIAL HYPERTENSION: Primary | ICD-10-CM

## 2025-06-30 DIAGNOSIS — E78.5 HYPERLIPIDEMIA, UNSPECIFIED HYPERLIPIDEMIA TYPE: ICD-10-CM

## 2025-06-30 PROCEDURE — 99999 PR PBB SHADOW E&M-EST. PATIENT-LVL III: CPT | Mod: PBBFAC,,, | Performed by: FAMILY MEDICINE

## 2025-06-30 PROCEDURE — 99214 OFFICE O/P EST MOD 30 MIN: CPT | Mod: S$PBB,,, | Performed by: FAMILY MEDICINE

## 2025-06-30 PROCEDURE — 99213 OFFICE O/P EST LOW 20 MIN: CPT | Mod: PBBFAC,PN | Performed by: FAMILY MEDICINE

## 2025-06-30 RX ORDER — PRAVASTATIN SODIUM 20 MG/1
20 TABLET ORAL NIGHTLY
Qty: 90 TABLET | Refills: 1 | Status: SHIPPED | OUTPATIENT
Start: 2025-06-30

## 2025-06-30 NOTE — PROGRESS NOTES
Subjective:       Patient ID: Carlos Saravia is a 69 y.o. male.    Chief Complaint: Follow-up    Lab Results   Component Value Date    WBC 11.18 11/19/2020    HGB 14.1 11/19/2020    HCT 44.4 11/19/2020     11/19/2020    CHOL 248 (H) 06/25/2025    TRIG 133 06/25/2025    HDL 52 06/25/2025    ALT 34 06/25/2025    AST 28 06/25/2025     06/25/2025    K 4.5 06/25/2025     06/25/2025    CREATININE 1.1 06/25/2025    BUN 20 06/25/2025    CO2 26 06/25/2025    TSH 1.450 05/27/2020    PSA 2.27 06/25/2025    INR 1.1 11/18/2020    HGBA1C 5.6 06/27/2025     Lab Results   Component Value Date    CHOL 248 (H) 06/25/2025    CHOL 211 (H) 12/27/2024    CHOL 214 (H) 06/26/2024      Lab Results   Component Value Date    HDL 52 06/25/2025    HDL 49 12/27/2024    HDL 52 06/26/2024     Lab Results   Component Value Date    LDLCALC 169.4 (H) 06/25/2025    LDLCALC 138.8 12/27/2024    LDLCALC 143.6 06/26/2024      Lab Results   Component Value Date    TRIG 133 06/25/2025    TRIG 116 12/27/2024    TRIG 92 06/26/2024     Lab Results   Component Value Date    TOTALCHOLEST 4.8 06/25/2025    TOTALCHOLEST 4.3 12/27/2024    TOTALCHOLEST 4.1 06/26/2024     Lab Results   Component Value Date    NONHDLCHOL 196 06/25/2025    NONHDLCHOL 162 12/27/2024    NONHDLCHOL 162 06/26/2024     Lab Results   Component Value Date    CHOLHDL 21.0 06/25/2025    CHOLHDL 23.2 12/27/2024    CHOLHDL 24.3 06/26/2024           History of Present Illness    CHIEF COMPLAINT:  Mr. Saravia presents for a follow-up visit to discuss cholesterol management and general health concerns.    HPI:  Mr. Saravia reports a history of gout with no attacks since 2018. He takes red tart cherry pills, 2 daily, which appear to help prevent gout attacks. He has prostate enlargement and urinary symptoms, including difficulty initiating urination when rising from a supine position, which resolves after standing for a period. He does not have nocturia when taking 1 Aleve at night.  His urine stream is generally strong, but he occasionally needs to void again 5-10 minutes after initial urination. He is taking Saw Palmetto, which he believes benefits his prostate health and urine flow.    He has high cholesterol, with a recent test showing a level of 248. He previously attempted pravastatin but had side effects including dark urine, diarrhea, palpitations, GI disturbances, myalgia, cognitive impairment, fatigue, and dyspnea at night. He is considering restarting pravastatin on a thrice-weekly schedule (Monday, Wednesday, Friday) to assess tolerability.    He reports erectile dysfunction (ED) and Peyronie's disease (PD). He was evaluated by a urologist, Dr. oMreno, for these conditions. The urologist prescribed Cialis 5mg daily, but he had cardiac issues after 2 nights and discontinued use. He reports the ability to achieve erections with medication but inability to ejaculate during sexual activity with his spouse.    He had a skin mole removed from his back by a dermatologist (Dr. Cee) in the past, resulting in a scar.    He denies current gout attacks, plaque in his arteries or carotids based on previous tests, and desire for any vaccines, including flu, shingles, RSV, and pneumococcal.    MEDICATIONS:  Mr. Saravia is on red tart cherry pills, taking 2 daily for gout prevention. He is also taking Saw [place]ney for prostate health. For high cholesterol, he is on Pravastatin, which has side effects including dark urine, diarrhea, palpitations, stomach problems, muscle aches, brain fog, fatigue, and trouble breathing at night. He takes fish oil for general health and to lower triglycerides, and occasionally uses a garlic supplement for cholesterol. Mr. Saravia is attempting to reduce Pravastatin intake to 3 times per week (Monday, Wednesday, Friday nights) to minimize side effects. He discontinued Cialis 5 mg nightly due to heart problems after 2 nights of use.    MEDICAL HISTORY:  Mr. Saravia has a  history of gout, with his last attack occurring in 2018. He has benign skin moles diagnosed in 2020. He also has prostate enlargement, Peyronie's disease (PD), and erectile dysfunction (ED).    SURGICAL HISTORY:  Mr. Saravia underwent skin mole removal on his back, performed by Dr. Cee. He also had an angiogram, which showed clean vessels.    TEST RESULTS:  Mr. Saravia recently completed a PSA test, and the results were okay. His cholesterol test showed a high total cholesterol of 248. The LDL test results were also high. Mr. Saravia previously   underwent an angiogram, which showed clean vessels with no plaque found.    ALLERGIES:  Mr. Saravia has some reaction to vaccines.    ROS:  Constitutional: +fatigue  Respiratory: +difficulty breathing  Cardiovascular: +palpitations  Gastrointestinal: +diarrhea  Genitourinary: +frequency, +difficulty urinating, -nocturia, +abnormal urine appearance  Male Genitourinary: +urine changes, +erectile dysfunction, +delayed orgasm  Musculoskeletal: +muscle pain, +body aches  Neurological: +confusion or disorientation  Psychiatric: +thought or thinking problems or concerns          Allergies and Medications:   Review of patient's allergies indicates:  No Known Allergies  Current Medications[1]    Family History:   Family History   Problem Relation Name Age of Onset    Hypertension Mother      Diabetes Mother      No Known Problems Father      No Known Problems Brother      No Known Problems Brother      No Known Problems Brother      No Known Problems Brother      No Known Problems Brother         Social History:   Social History[2]        Objective:     Vitals:    06/30/25 1024   BP: 135/79   Pulse: 60   Temp: 97.7 °F (36.5 °C)        Physical Exam    General: No acute distress. Well-developed. Well-nourished.  Eyes: EOMI. Sclerae anicteric.  HENT: Normocephalic. Atraumatic. Nares patent. Moist oral mucosa.  Cardiovascular: Regular rate. Regular rhythm. No murmurs. No rubs. No gallops.  Normal S1, S2.  Respiratory: Normal respiratory effort. Clear to auscultation bilaterally. No rales. No rhonchi. No wheezing.  Musculoskeletal: No  obvious deformity.  Extremities: No lower extremity edema.  Neurological: Alert & oriented x3. No slurred speech. Normal gait.  Psychiatric: Normal mood. Normal affect. Good insight. Good judgment.  Skin: Warm. Dry. No rash.            Assessment:       1. Essential hypertension    2. Hyperlipidemia, unspecified hyperlipidemia type    3. Idiopathic chronic gout without tophus, unspecified site    4. Primary hypertension    5. Prostate enlargement    6. Mixed hyperlipidemia        Plan:       Assessment & Plan    E78.2 Mixed hyperlipidemia    HYPERLIPIDEMIA:  - Assessed patient's cholesterol levels, noting elevated total cholesterol (248) and high LDL despite clean cardiac vessels.  - Explained that clean vessels don't eliminate risk of stroke or ischemic issues.  - Clarified that statins prevent myocardial infarctions and cerebrovascular accidents rather than causing cardiac problems.  - Initiated pravastatin therapy to be taken every other day (Monday, Wednesday, Friday nights) and continued fish oil supplementation as part of lipid management.  - Discussed potential medication side effects to ensure proper understanding and compliance.    GOUT:  - Evaluated gout condition; patient has had no attacks since 2018.    PROSTATE HEALTH:  - Evaluated prostate health, including urinary symptoms and PSA.    ERECTILE DYSFUNCTION:  - Discussed erectile dysfunction and orgasmic dysfunction, noting the complexity of these issues.  - Mentioned that Cialis has a 36-hour half-life.    FOLLOW-UP:  - Follow up in 6 months.        Carlos was seen today for follow-up.    Diagnoses and all orders for this visit:    Essential hypertension    Hyperlipidemia, unspecified hyperlipidemia type  -     pravastatin (PRAVACHOL) 20 MG tablet; Take 1 tablet (20 mg total) by mouth every  evening.    Idiopathic chronic gout without tophus, unspecified site    Primary hypertension    Prostate enlargement    Mixed hyperlipidemia  -     pravastatin (PRAVACHOL) 20 MG tablet; Take 1 tablet (20 mg total) by mouth every evening.         Follow up in about 6 months (around 12/30/2025) for HRA with [facility] Glasgow or [facility].  This note was generated with the assistance of ambient listening technology. Verbal consent was obtained by the patient and accompanying visitor(s) for the recording of patient appointment to facilitate this note. I attest to having reviewed and edited the generated note for accuracy, though some syntax or spelling errors may persist. Please contact the author of this note for any clarification.            [1]   Current Outpatient Medications   Medication Sig Dispense Refill    ascorbic acid, vitamin C, (VITAMIN C) 1000 MG tablet Take by mouth once daily.      biotin 5,000 mcg TbDL Take 5,000 mcg by mouth once daily.       cranberry 500 mg Cap Take by mouth.      enalapril (VASOTEC) 10 MG tablet Take 1 tablet by mouth once daily 90 tablet 1    garlic Tab Take by mouth.      glucosamine &chondroit-mv-min3 486-115-67-0.5 mg Tab Take 1 tablet by mouth once daily.      omega-3 fatty acids/fish oil (FISH OIL-OMEGA-3 FATTY ACIDS) 300-1,000 mg capsule Take 2 capsules by mouth once daily.      pravastatin (PRAVACHOL) 20 MG tablet Take 1 tablet (20 mg total) by mouth every evening. 90 tablet 1    saw palmetto 160 MG capsule Take 160 mg by mouth 2 (two) times daily.       No current facility-administered medications for this visit.   [2]   Social History  Socioeconomic History    Marital status:    Tobacco Use    Smoking status: Never    Smokeless tobacco: Never   Substance and Sexual Activity    Alcohol use: No    Drug use: No    Sexual activity: Not Currently     Social Drivers of Health     Financial Resource Strain: Low Risk  (4/10/2025)    Overall Financial Resource Strain (CARDIA)      Difficulty of Paying Living Expenses: Not hard at all   Food Insecurity: No Food Insecurity (4/10/2025)    Hunger Vital Sign     Worried About Running Out of Food in the Last Year: Never true     Ran Out of Food in the Last Year: Never true   Transportation Needs: No Transportation Needs (4/10/2025)    PRAPARE - Transportation     Lack of Transportation (Medical): No     Lack of Transportation (Non-Medical): No   Physical Activity: Insufficiently Active (4/10/2025)    Exercise Vital Sign     Days of Exercise per Week: 4 days     Minutes of Exercise per Session: 30 min   Stress: No Stress Concern Present (4/10/2025)    [employer] - Occupational Stress Questionnaire     Feeling of Stress : Not at all   Housing Stability: Low Risk  (4/10/2025)    Housing Stability Vital Sign     Unable to Pay for Housing in the Last Year: No     Homeless in the Last Year: No

## (undated) DEVICE — CATHETER EXPO MLTPK 5FX100-110CM

## (undated) DEVICE — SHEATH PINNACLE 5FRX10CM W/GUIDEWIRE

## (undated) DEVICE — DEVICE CLOSURE VASCADE 5FR: Type: IMPLANTABLE DEVICE | Site: GROIN | Status: NON-FUNCTIONAL

## (undated) DEVICE — GUIDEWIRE DOUBLE ENDED .035 DIA. 150CML